# Patient Record
Sex: FEMALE | Race: WHITE | NOT HISPANIC OR LATINO | Employment: FULL TIME | URBAN - METROPOLITAN AREA
[De-identification: names, ages, dates, MRNs, and addresses within clinical notes are randomized per-mention and may not be internally consistent; named-entity substitution may affect disease eponyms.]

---

## 2023-10-11 ENCOUNTER — LAB (OUTPATIENT)
Dept: LAB | Facility: LAB | Age: 38
End: 2023-10-11
Payer: COMMERCIAL

## 2023-10-11 ENCOUNTER — OFFICE VISIT (OUTPATIENT)
Dept: PRIMARY CARE | Facility: CLINIC | Age: 38
End: 2023-10-11
Payer: COMMERCIAL

## 2023-10-11 ENCOUNTER — ANCILLARY PROCEDURE (OUTPATIENT)
Dept: RADIOLOGY | Facility: CLINIC | Age: 38
End: 2023-10-11
Payer: COMMERCIAL

## 2023-10-11 VITALS
BODY MASS INDEX: 36.29 KG/M2 | OXYGEN SATURATION: 97 % | TEMPERATURE: 100.3 F | HEART RATE: 87 BPM | HEIGHT: 69 IN | WEIGHT: 245 LBS

## 2023-10-11 DIAGNOSIS — R06.02 SHORTNESS OF BREATH: ICD-10-CM

## 2023-10-11 DIAGNOSIS — E55.9 VITAMIN D DEFICIENCY: ICD-10-CM

## 2023-10-11 DIAGNOSIS — I10 PRIMARY HYPERTENSION: ICD-10-CM

## 2023-10-11 DIAGNOSIS — I10 PRIMARY HYPERTENSION: Primary | ICD-10-CM

## 2023-10-11 PROBLEM — Z86.39 HISTORY OF THYROID DISORDER: Status: ACTIVE | Noted: 2023-10-11

## 2023-10-11 PROBLEM — Z86.79 HISTORY OF HIGH BLOOD PRESSURE: Status: ACTIVE | Noted: 2023-10-11

## 2023-10-11 LAB
ALBUMIN SERPL BCP-MCNC: 4.3 G/DL (ref 3.4–5)
ALP SERPL-CCNC: 42 U/L (ref 33–110)
ALT SERPL W P-5'-P-CCNC: 12 U/L (ref 7–45)
ANION GAP SERPL CALC-SCNC: 13 MMOL/L (ref 10–20)
AST SERPL W P-5'-P-CCNC: 16 U/L (ref 9–39)
BASOPHILS # BLD AUTO: 0.04 X10*3/UL (ref 0–0.1)
BASOPHILS NFR BLD AUTO: 0.5 %
BILIRUB SERPL-MCNC: 0.7 MG/DL (ref 0–1.2)
BUN SERPL-MCNC: 11 MG/DL (ref 6–23)
CALCIUM SERPL-MCNC: 9.2 MG/DL (ref 8.6–10.3)
CHLORIDE SERPL-SCNC: 102 MMOL/L (ref 98–107)
CO2 SERPL-SCNC: 27 MMOL/L (ref 21–32)
CREAT SERPL-MCNC: 0.73 MG/DL (ref 0.5–1.05)
EOSINOPHIL # BLD AUTO: 0.18 X10*3/UL (ref 0–0.7)
EOSINOPHIL NFR BLD AUTO: 2.3 %
ERYTHROCYTE [DISTWIDTH] IN BLOOD BY AUTOMATED COUNT: 14.6 % (ref 11.5–14.5)
GFR SERPL CREATININE-BSD FRML MDRD: >90 ML/MIN/1.73M*2
GLUCOSE SERPL-MCNC: 112 MG/DL (ref 74–99)
HCT VFR BLD AUTO: 41.8 % (ref 36–46)
HGB BLD-MCNC: 13.1 G/DL (ref 12–16)
IMM GRANULOCYTES # BLD AUTO: 0.03 X10*3/UL (ref 0–0.7)
IMM GRANULOCYTES NFR BLD AUTO: 0.4 % (ref 0–0.9)
LYMPHOCYTES # BLD AUTO: 2 X10*3/UL (ref 1.2–4.8)
LYMPHOCYTES NFR BLD AUTO: 25.5 %
MCH RBC QN AUTO: 29.2 PG (ref 26–34)
MCHC RBC AUTO-ENTMCNC: 31.3 G/DL (ref 32–36)
MCV RBC AUTO: 93 FL (ref 80–100)
MONOCYTES # BLD AUTO: 0.52 X10*3/UL (ref 0.1–1)
MONOCYTES NFR BLD AUTO: 6.6 %
NEUTROPHILS # BLD AUTO: 5.07 X10*3/UL (ref 1.2–7.7)
NEUTROPHILS NFR BLD AUTO: 64.7 %
NRBC BLD-RTO: 0 /100 WBCS (ref 0–0)
PLATELET # BLD AUTO: 438 X10*3/UL (ref 150–450)
PMV BLD AUTO: 10.5 FL (ref 7.5–11.5)
POTASSIUM SERPL-SCNC: 4 MMOL/L (ref 3.5–5.3)
PROT SERPL-MCNC: 7.9 G/DL (ref 6.4–8.2)
RBC # BLD AUTO: 4.49 X10*6/UL (ref 4–5.2)
SODIUM SERPL-SCNC: 138 MMOL/L (ref 136–145)
TSH SERPL-ACNC: 2.12 MIU/L (ref 0.44–3.98)
WBC # BLD AUTO: 7.8 X10*3/UL (ref 4.4–11.3)

## 2023-10-11 PROCEDURE — 80053 COMPREHEN METABOLIC PANEL: CPT

## 2023-10-11 PROCEDURE — 84443 ASSAY THYROID STIM HORMONE: CPT

## 2023-10-11 PROCEDURE — 1036F TOBACCO NON-USER: CPT | Performed by: PHYSICIAN ASSISTANT

## 2023-10-11 PROCEDURE — 71046 X-RAY EXAM CHEST 2 VIEWS: CPT

## 2023-10-11 PROCEDURE — 85025 COMPLETE CBC W/AUTO DIFF WBC: CPT

## 2023-10-11 PROCEDURE — 99214 OFFICE O/P EST MOD 30 MIN: CPT | Performed by: PHYSICIAN ASSISTANT

## 2023-10-11 PROCEDURE — 71046 X-RAY EXAM CHEST 2 VIEWS: CPT | Performed by: RADIOLOGY

## 2023-10-11 PROCEDURE — 36415 COLL VENOUS BLD VENIPUNCTURE: CPT

## 2023-10-11 PROCEDURE — 82652 VIT D 1 25-DIHYDROXY: CPT

## 2023-10-11 RX ORDER — LISINOPRIL 20 MG/1
20 TABLET ORAL DAILY
COMMUNITY
Start: 2023-08-29

## 2023-10-11 RX ORDER — CYCLOBENZAPRINE HCL 10 MG
10 TABLET ORAL 3 TIMES DAILY PRN
COMMUNITY

## 2023-10-11 RX ORDER — CETIRIZINE HYDROCHLORIDE 10 MG/1
TABLET, CHEWABLE ORAL DAILY
COMMUNITY

## 2023-10-11 RX ORDER — METOPROLOL SUCCINATE 25 MG/1
25 TABLET, EXTENDED RELEASE ORAL DAILY
COMMUNITY
Start: 2023-08-29 | End: 2024-01-08 | Stop reason: WASHOUT

## 2023-10-11 RX ORDER — IBUPROFEN 800 MG/1
800 TABLET ORAL EVERY 6 HOURS PRN
COMMUNITY
Start: 2020-07-18

## 2023-10-11 RX ORDER — POLYETHYLENE GLYCOL 3350 17 G/17G
17 POWDER, FOR SOLUTION ORAL DAILY
COMMUNITY
End: 2023-11-15 | Stop reason: WASHOUT

## 2023-10-11 RX ORDER — LEVOTHYROXINE SODIUM 50 UG/1
50 TABLET ORAL DAILY
COMMUNITY

## 2023-10-11 RX ORDER — AZELASTINE 1 MG/ML
1 SPRAY, METERED NASAL 2 TIMES DAILY
COMMUNITY

## 2023-10-11 ASSESSMENT — PATIENT HEALTH QUESTIONNAIRE - PHQ9
2. FEELING DOWN, DEPRESSED OR HOPELESS: NOT AT ALL
SUM OF ALL RESPONSES TO PHQ9 QUESTIONS 1 AND 2: 0
1. LITTLE INTEREST OR PLEASURE IN DOING THINGS: NOT AT ALL

## 2023-10-11 NOTE — PROGRESS NOTES
Subjective   Patient ID: Rajani Morton is a 38 y.o. female who presents for New Patient Visit.    \[facility]\"" care   Pcp Dr. rojas  Patient says has had covid in sept and her bp and pulse has been high and ox very low was concerned.       Pt got Covid in Sept 2023 (the 4th time with Covid, very rough symptoms), dx w/ home test, took 12 days to get better.   Pt is vaccinated (4 total vaccines).  Cold sx were bad, drainage, ST, nausea, cough, HA, on and off LGF, body aches (for 7 d).   NO SOB    Since cold sx got better she was experiencing severe exhausted, HA (9/10 pain), weakness.   She checked BP at home and it was 170/110. She kept checking it for a week and it is been elevated. The bottom number is the one that is not going down under 90. Her NL BP is 130/80.   He O2 sat on RA is in the 90's . She has difficulty getting through sentences when speaking to customers. Her O2 is is dropping to the low 80's when reading to customers for 5 min  She has trouble going up and down the stairs.   She was brought down to 78% with little movement just this AM and pulse is 150-160bpm.     Lisinopril is PM and Metoprolol is AM.    Last labs 1 yr ago.     Review of Systems  Constitutional: Patient denies any fever, chills, loss of appetite, or unexplained weight loss.  Cardiovascular: Patient denies any chest pain, shortness of breath with exertion, tachycardia, palpitations, orthopnea, or paroxysmal nocturnal dyspnea.  Respiratory: Patient denies any cough, shortness breath, or wheezing.  Gastrointestinal patient denies any nausea, vomiting, diarrhea, constipation, melena, hematochezia, or reflux symptoms  Skin: Denies any rashes or skin lesions   Neurology: Patient denies any new motor or sensory losses.  Denies any numbness, tingling, weakness, and incoordination of the extremities.  Patient also denies any tremor, seizures, or gait instability.  Endocrinology: Denies any polyuria, polydipsia, polyphagia, or  "heat/cold intolerance.    Objective   Pulse 87   Temp 37.9 °C (100.3 °F)   Ht 1.753 m (5' 9\")   Wt 111 kg (245 lb)   SpO2 97%   BMI 36.18 kg/m²     Physical Exam  Gen. appearance: Alert and cooperative, in no acute distress, well-developed, well-nourished obese female.  Neck: Supple and without adenopathy or rigidity.  There is no JVD at 90° and no carotid bruits are noted.  There is no thyromegaly, thyroid tenderness, or palpable thyroid nodules.  Heart: Regular rate and rhythm without murmur or ectopy.  Lungs: Lungs are clear to auscultation bilaterally with good air exchange.  Skin: Good turgor, moist, warm and without rashes or lesions.  Neurological exam: Alert and oriented ×3, no tremor, normal gait.  Extremities: No clubbing, cyanosis, or edema    Assessment/Plan   Diagnoses and all orders for this visit:  Primary hypertension  -     TSH with reflex to Free T4 if abnormal; Future  -     24 Hour Blood Pressure Monitor; Future  Patient's blood pressure is uncontrolled and so we ordered a 24-hour blood pressure monitor before adjusting her medications.  She will have that done as soon as possible and we will call with that result as soon as it is reviewed.  She was told that if she notices her blood pressure go over 160/100 (when not wearing the monitor) at home she is to take an extra dose of metoprolol Go to the emergency room for further evaluation.    Shortness of breath  -     XR chest 2 views; Future  -     CBC and Auto Differential; Future  -     Comprehensive Metabolic Panel; Future  - D Dimer  Patient had her fourth COVID infection in September 2023.  She states that it was the worst one by far and that she has had left over symptoms of shortness of breath, fatigue, and elevated pulse.  Patient is to have a chest x-ray done, labs done and we will call with that result as soon as it is reviewed.  We will also consider referral to long COVID clinic if needed.    Vitamin D deficiency  -     Vitamin D " 1,25 Dihydroxy (for eval of hypercalcemia); Future  Patient is currently not on supplementation so we ordered a vitamin D level and we will call her with that result as soon as it is reviewed.    Patient is to have labs and chest x-ray done, 24-hour blood pressure monitor done and we will decide on any further visits after that is reviewed.    Patient understands that should they have testing outside   facilities that we may not receive the results and was told to call us if they have not heard from our office within a week after testing.    [facility] uses voice recognition technology for dictations. Sometimes the software misinterprets words. Please take this into account when reading this.

## 2023-10-12 ENCOUNTER — LAB (OUTPATIENT)
Dept: LAB | Facility: LAB | Age: 38
End: 2023-10-12
Payer: COMMERCIAL

## 2023-10-12 ENCOUNTER — TELEPHONE (OUTPATIENT)
Dept: PRIMARY CARE | Facility: CLINIC | Age: 38
End: 2023-10-12

## 2023-10-12 DIAGNOSIS — R06.02 SHORTNESS OF BREATH: ICD-10-CM

## 2023-10-12 LAB — D DIMER PPP FEU-MCNC: 705 NG/ML FEU

## 2023-10-12 PROCEDURE — 85379 FIBRIN DEGRADATION QUANT: CPT

## 2023-10-12 PROCEDURE — 36415 COLL VENOUS BLD VENIPUNCTURE: CPT

## 2023-10-12 NOTE — TELEPHONE ENCOUNTER
I called the patient asking where she is going to have this monitor put on she said central scheduling department vascular. I have Called the central scheduling department to see what orders they needed and they have no clue how to put it in the system.       Ask Jennifer if she knows ow to put an order for the removal of the 24 bp monitor.

## 2023-10-14 LAB — 1,25(OH)2D3 SERPL-MCNC: 49 PG/ML (ref 19.9–79.3)

## 2023-10-16 ENCOUNTER — APPOINTMENT (OUTPATIENT)
Dept: CARDIOLOGY | Facility: CLINIC | Age: 38
End: 2023-10-16
Payer: COMMERCIAL

## 2023-10-16 DIAGNOSIS — I10 ESSENTIAL HYPERTENSION: Primary | ICD-10-CM

## 2023-10-17 PROBLEM — E66.811 OBESITY, CLASS I, BMI 30-34.9: Status: ACTIVE | Noted: 2022-11-21

## 2023-10-17 PROBLEM — M25.50 POLYARTHRALGIA: Status: ACTIVE | Noted: 2022-05-19

## 2023-10-17 PROBLEM — R53.83 FATIGUE: Status: ACTIVE | Noted: 2022-05-19

## 2023-10-17 PROBLEM — E03.9 HYPOTHYROIDISM, ACQUIRED: Status: ACTIVE | Noted: 2020-04-13

## 2023-10-17 PROBLEM — E66.9 OBESITY, CLASS I, BMI 30-34.9: Status: ACTIVE | Noted: 2022-11-21

## 2023-10-17 PROBLEM — M79.7 FIBROMYALGIA: Status: ACTIVE | Noted: 2017-02-08

## 2023-10-17 ASSESSMENT — ENCOUNTER SYMPTOMS
ORTHOPNEA: 0
SHORTNESS OF BREATH: 1
NECK PAIN: 1
HYPERTENSION: 1
HEADACHES: 1
PND: 0
BLURRED VISION: 0
PALPITATIONS: 0
SWEATS: 1

## 2023-10-18 ENCOUNTER — ANCILLARY PROCEDURE (OUTPATIENT)
Dept: RADIOLOGY | Facility: CLINIC | Age: 38
End: 2023-10-18
Payer: COMMERCIAL

## 2023-10-18 ENCOUNTER — APPOINTMENT (OUTPATIENT)
Dept: PRIMARY CARE | Facility: CLINIC | Age: 38
End: 2023-10-18
Payer: COMMERCIAL

## 2023-10-18 ENCOUNTER — OFFICE VISIT (OUTPATIENT)
Dept: PRIMARY CARE | Facility: CLINIC | Age: 38
End: 2023-10-18
Payer: COMMERCIAL

## 2023-10-18 VITALS
HEART RATE: 78 BPM | TEMPERATURE: 98 F | OXYGEN SATURATION: 98 % | BODY MASS INDEX: 37.06 KG/M2 | HEIGHT: 69 IN | DIASTOLIC BLOOD PRESSURE: 89 MMHG | SYSTOLIC BLOOD PRESSURE: 140 MMHG | WEIGHT: 250.2 LBS

## 2023-10-18 DIAGNOSIS — M79.7 FIBROMYALGIA: ICD-10-CM

## 2023-10-18 DIAGNOSIS — R79.89 D-DIMER, ELEVATED: ICD-10-CM

## 2023-10-18 DIAGNOSIS — R06.02 SHORTNESS OF BREATH: Primary | ICD-10-CM

## 2023-10-18 DIAGNOSIS — I10 ESSENTIAL HYPERTENSION: ICD-10-CM

## 2023-10-18 PROCEDURE — 2550000001 HC RX 255 CONTRASTS

## 2023-10-18 PROCEDURE — 3077F SYST BP >= 140 MM HG: CPT | Performed by: PHYSICIAN ASSISTANT

## 2023-10-18 PROCEDURE — 99214 OFFICE O/P EST MOD 30 MIN: CPT | Performed by: PHYSICIAN ASSISTANT

## 2023-10-18 PROCEDURE — 71275 CT ANGIOGRAPHY CHEST: CPT

## 2023-10-18 PROCEDURE — 3079F DIAST BP 80-89 MM HG: CPT | Performed by: PHYSICIAN ASSISTANT

## 2023-10-18 PROCEDURE — 71275 CT ANGIOGRAPHY CHEST: CPT | Mod: FOREIGN READ | Performed by: RADIOLOGY

## 2023-10-18 PROCEDURE — 1036F TOBACCO NON-USER: CPT | Performed by: PHYSICIAN ASSISTANT

## 2023-10-18 RX ADMIN — IOHEXOL 75 ML: 350 INJECTION, SOLUTION INTRAVENOUS at 11:53

## 2023-10-18 ASSESSMENT — ENCOUNTER SYMPTOMS
NECK PAIN: 1
BLURRED VISION: 0
SHORTNESS OF BREATH: 1
PND: 0
ORTHOPNEA: 0
HYPERTENSION: 1
SWEATS: 1
HEADACHES: 1
PALPITATIONS: 0

## 2023-10-18 ASSESSMENT — PATIENT HEALTH QUESTIONNAIRE - PHQ9
SUM OF ALL RESPONSES TO PHQ9 QUESTIONS 1 AND 2: 0
1. LITTLE INTEREST OR PLEASURE IN DOING THINGS: NOT AT ALL
2. FEELING DOWN, DEPRESSED OR HOPELESS: NOT AT ALL

## 2023-10-18 NOTE — PROGRESS NOTES
"Subjective   Patient ID: Rajani Morton is a 38 y.o. female who presents for Hypertension and Shortness of Breath.    Hypertension  This is a recurrent problem. The current episode started more than 1 year ago. The problem has been rapidly worsening since onset. The problem is resistant. Associated symptoms include headaches, neck pain, peripheral edema, shortness of breath and sweats. Pertinent negatives include no anxiety, blurred vision, chest pain, malaise/fatigue, orthopnea, palpitations or PND. Agents associated with hypertension include NSAIDs and thyroid hormones. Risk factors for coronary artery disease include family history, obesity, sedentary lifestyle and stress. Compliance problems include diet and exercise.      161/108 BP this morning when she checked it.  Chest feels tight and heavy  O2 is not dropping as low but she feels like she does have some SOB.   D Dimer was elevated at 705.  + LE edema slightly worse than usual.    A lot of autoimmune issues:  Auto immune of liver in sister    + family h/o afib      Review of Systems   Constitutional:  Negative for malaise/fatigue.   Eyes:  Negative for blurred vision.   Respiratory:  Positive for shortness of breath.    Cardiovascular:  Negative for chest pain, palpitations, orthopnea and PND.   Musculoskeletal:  Positive for neck pain.   Neurological:  Positive for headaches.       Objective   /89   Pulse 78   Temp 36.7 °C (98 °F)   Ht 1.753 m (5' 9\")   Wt 113 kg (250 lb 3.2 oz)   SpO2 98%   BMI 36.95 kg/m²     Physical Exam  Gen. appearance: Alert and cooperative, in no acute distress, well-developed, well-nourished obese female.  Neck: Supple and without adenopathy or rigidity.  There is no JVD at 90° and no carotid bruits are noted.  There is no thyromegaly, thyroid tenderness, or palpable thyroid nodules.  Heart: Regular rate and rhythm without murmur or ectopy.  Lungs: Lungs are clear to auscultation bilaterally with good air " exchange.  Skin: Good turgor, moist, warm and without rashes or lesions.  Neurological exam: Alert and oriented ×3, no tremor, normal gait.  Extremities: No clubbing, cyanosis, or edema    Assessment/Plan   Diagnoses and all orders for this visit:  Shortness of breath  -     CT angio chest for pulmonary embolism; Future  Patient's D-dimer was elevated and she is symptomatic with a drop in her O2 after having COVID a month ago.  She is to have a stat CT angio of the chest and we will call her with the results as soon as it is reviewed.    D-dimer, elevated  -     CT angio chest for pulmonary embolism; Future    HTN-patient is to stop at the  to have her 24-hour blood pressure monitor scheduled.  Order is in a previous conversation.  Blood pressure still slightly elevated today.  She is compliant with her lisinopril.    Fibromyalgia  -     Referral to Rheumatology; Future  She has known fibromyalgia and she states that her fibromyalgia and fatigue have escalated.  Referral to rheumatology will be scheduled before she leaves the office.    Patient's return to the clinic will be decided once her CT has been reviewed.    Patient understands that should they have testing outside   facilities that we may not receive the results and was told to call us if they have not heard from our office within a week after testing.    [facility] uses voice recognition technology for dictations. Sometimes the software misinterprets words. Please take this into account when reading this.

## 2023-10-18 NOTE — TELEPHONE ENCOUNTER
----- Message from Faustina Soria PA-C sent at 10/18/2023 12:59 PM EDT -----  Please call the patient regarding her CT, it was NL, no evidence of pulm embolism.

## 2023-10-23 ENCOUNTER — HOSPITAL ENCOUNTER (OUTPATIENT)
Dept: CARDIOLOGY | Facility: CLINIC | Age: 38
Discharge: HOME | End: 2023-10-23
Payer: COMMERCIAL

## 2023-10-23 DIAGNOSIS — I10 ESSENTIAL HYPERTENSION: ICD-10-CM

## 2023-10-23 DIAGNOSIS — I10 PRIMARY HYPERTENSION: ICD-10-CM

## 2023-10-23 DIAGNOSIS — K76.0 FATTY METAMORPHOSIS OF LIVER: Primary | ICD-10-CM

## 2023-10-24 ENCOUNTER — TELEMEDICINE (OUTPATIENT)
Dept: GASTROENTEROLOGY | Facility: CLINIC | Age: 38
End: 2023-10-24
Payer: COMMERCIAL

## 2023-10-24 ENCOUNTER — HOSPITAL ENCOUNTER (OUTPATIENT)
Dept: CARDIOLOGY | Facility: CLINIC | Age: 38
Discharge: HOME | End: 2023-10-24
Payer: COMMERCIAL

## 2023-10-24 ENCOUNTER — LAB (OUTPATIENT)
Dept: LAB | Facility: LAB | Age: 38
End: 2023-10-24
Payer: COMMERCIAL

## 2023-10-24 DIAGNOSIS — K76.0 FATTY METAMORPHOSIS OF LIVER: ICD-10-CM

## 2023-10-24 DIAGNOSIS — K58.1 IRRITABLE BOWEL SYNDROME WITH CONSTIPATION: ICD-10-CM

## 2023-10-24 DIAGNOSIS — K76.0 FATTY LIVER: Primary | ICD-10-CM

## 2023-10-24 DIAGNOSIS — R00.0 TACHYCARDIA: ICD-10-CM

## 2023-10-24 DIAGNOSIS — I10 ELEVATED HEART RATE WITH ELEVATED BLOOD PRESSURE AND DIAGNOSIS OF HYPERTENSION: ICD-10-CM

## 2023-10-24 DIAGNOSIS — I10 ESSENTIAL HYPERTENSION: ICD-10-CM

## 2023-10-24 DIAGNOSIS — R00.9 ELEVATED HEART RATE WITH ELEVATED BLOOD PRESSURE AND DIAGNOSIS OF HYPERTENSION: ICD-10-CM

## 2023-10-24 DIAGNOSIS — R10.11 RUQ PAIN: ICD-10-CM

## 2023-10-24 DIAGNOSIS — I10 ACCELERATED ESSENTIAL HYPERTENSION: ICD-10-CM

## 2023-10-24 LAB
A1AT SERPL NEPH-MCNC: 160 MG/DL (ref 84–218)
HBV CORE IGM SER QL: NONREACTIVE
HBV SURFACE AG SERPL QL IA: NONREACTIVE
HCV AB SER QL: NONREACTIVE
IRON SATN MFR SERPL: 27 % (ref 25–45)
IRON SERPL-MCNC: 88 UG/DL (ref 35–150)
TIBC SERPL-MCNC: 328 UG/DL (ref 240–445)
UIBC SERPL-MCNC: 240 UG/DL (ref 110–370)

## 2023-10-24 PROCEDURE — 93788 AMBL BP MNTR W/SW A/R: CPT

## 2023-10-24 PROCEDURE — 99214 OFFICE O/P EST MOD 30 MIN: CPT | Mod: 95,25 | Performed by: NURSE PRACTITIONER

## 2023-10-24 PROCEDURE — 86707 HEPATITIS BE ANTIBODY: CPT

## 2023-10-24 PROCEDURE — 99204 OFFICE O/P NEW MOD 45 MIN: CPT | Performed by: NURSE PRACTITIONER

## 2023-10-24 PROCEDURE — 87340 HEPATITIS B SURFACE AG IA: CPT

## 2023-10-24 PROCEDURE — 36415 COLL VENOUS BLD VENIPUNCTURE: CPT

## 2023-10-24 PROCEDURE — 83550 IRON BINDING TEST: CPT

## 2023-10-24 PROCEDURE — 82103 ALPHA-1-ANTITRYPSIN TOTAL: CPT

## 2023-10-24 PROCEDURE — 86015 ACTIN ANTIBODY EACH: CPT

## 2023-10-24 PROCEDURE — 86803 HEPATITIS C AB TEST: CPT

## 2023-10-24 PROCEDURE — 86381 MITOCHONDRIAL ANTIBODY EACH: CPT

## 2023-10-24 PROCEDURE — 93784 AMBL BP MNTR W/SOFTWARE: CPT | Performed by: INTERNAL MEDICINE

## 2023-10-24 PROCEDURE — 86705 HEP B CORE ANTIBODY IGM: CPT

## 2023-10-24 PROCEDURE — 83540 ASSAY OF IRON: CPT

## 2023-10-24 PROCEDURE — 93790 AMBL BP MNTR W/SW I&R: CPT | Performed by: PHYSICIAN ASSISTANT

## 2023-10-24 PROCEDURE — 86376 MICROSOMAL ANTIBODY EACH: CPT

## 2023-10-24 RX ORDER — WHEAT DEXTRIN 5 G/7.4 G
POWDER (GRAM) ORAL
Qty: 248 G | Refills: 1 | Status: SHIPPED | OUTPATIENT
Start: 2023-10-24 | End: 2024-04-19 | Stop reason: WASHOUT

## 2023-10-24 RX ORDER — POLYETHYLENE GLYCOL 3350 17 G/17G
17 POWDER, FOR SOLUTION ORAL DAILY PRN
Qty: 30 PACKET | Refills: 2 | Status: SHIPPED | OUTPATIENT
Start: 2023-10-24 | End: 2024-01-24

## 2023-10-24 RX ORDER — OMEPRAZOLE 20 MG/1
20 TABLET, DELAYED RELEASE ORAL DAILY
Qty: 30 TABLET | Refills: 11 | Status: SHIPPED | OUTPATIENT
Start: 2023-10-24 | End: 2024-02-12 | Stop reason: WASHOUT

## 2023-10-24 ASSESSMENT — ENCOUNTER SYMPTOMS
CHEST TIGHTNESS: 0
ROS GI COMMENTS: SEE HPI
WHEEZING: 0
EYES NEGATIVE: 1
NEUROLOGICAL NEGATIVE: 1
MUSCULOSKELETAL NEGATIVE: 1
COUGH: 0
PSYCHIATRIC NEGATIVE: 1
APNEA: 0
DIFFICULTY URINATING: 0
SHORTNESS OF BREATH: 0
HEMATOLOGIC/LYMPHATIC NEGATIVE: 1
CARDIOVASCULAR NEGATIVE: 1
DIAPHORESIS: 0
STRIDOR: 0
RESPIRATORY NEGATIVE: 1
ENDOCRINE NEGATIVE: 1
CHILLS: 0
FATIGUE: 0
ALLERGIC/IMMUNOLOGIC NEGATIVE: 1
FEVER: 0

## 2023-10-24 NOTE — PROGRESS NOTES
Subjective   Patient ID: Rajani Morton is a 38 y.o. female who presents for No chief complaint on file..  Presents today for IBS-C  Fatty liver on cardiac CT   Has RUQ pain. Was seen in the ED, having severe pain in the RUQ. Had CT and US that was negative. This lasted for one day.   Thought maybe food made it worse, pain was happening in the middle of the night. The pain has improved but still there. Localized to the RUQ. The pain radiates to her back. Liver Enzymes were normal. She has extra gassy.     Everything is worse since COVID last month.     She denies heartburn, reflux, vomiting, dysphagia, odynphagia.       For IBS-C, she reprots she is not taking anything. She reports nothing was covered. She did have a colonsocopy.  She has a BM a few times/week. She is using Miralax. She is using stool softener. She drinks a lot of water.   The colonoscopy was normal. She did not try Linzess    Her PCP ordered labs for fatty liver.     Family Hx: She denies a family hx of CRC, GI cancers      PSH: Hysterectomy (endometriosis)        Review of Systems   Constitutional:  Negative for chills, diaphoresis, fatigue and fever.   HENT: Negative.     Eyes: Negative.    Respiratory: Negative.  Negative for apnea, cough, chest tightness, shortness of breath, wheezing and stridor.    Cardiovascular: Negative.    Gastrointestinal:         See HPI    Endocrine: Negative.    Genitourinary: Negative.  Negative for difficulty urinating.   Musculoskeletal: Negative.    Skin: Negative.    Allergic/Immunologic: Negative.    Neurological: Negative.    Hematological: Negative.    Psychiatric/Behavioral: Negative.         Objective   Physical Exam  Neurological:      Mental Status: She is alert.   Psychiatric:         Mood and Affect: Mood normal.         Assessment/Plan   Diagnoses and all orders for this visit:  Fatty liver  -     Liver Elastography (Fibroscan); Future  Irritable bowel syndrome with constipation  -     wheat dextrin  (Benefiber Healthy Shape) 5 gram/7.4 gram powder; Take 1 teaspoon daily  -     polyethylene glycol (Miralax) packet; Take 17 g by mouth once daily as needed (as needed for constipation).  RUQ pain  -     NM hepatobiliary w cholecystokinin; Future  -     omeprazole OTC (Prilosec OTC) 20 mg EC tablet; Take 1 tablet (20 mg) by mouth once daily. Do not crush, chew, or split.  -     US gallbladder; Future  Accelerated essential hypertension  Elevated heart rate with elevated blood pressure and diagnosis of hypertension  -     Referral to Cardiology; Future  Tachycardia  -     Referral to Cardiology; Future    This is a 38 year old female with multiple GI complaints. Started having RUQ pain, radiating to back, woke her from sleep. She was seen in the ED and had a normal CT and US, however found to have fatty liver. She has chronic constipation, a prior colonoscopy (2023) was normal.     Fatty liver   - fibroscan, await lab results   2. IBS-C  - start fiber and Miralax and she will message medication that caused allergic reaction and discuss Linzess/IBSrela at follow up  3. RUQ pain  - check HIDA scan, GBUS, discussed s/sx to go to the ER   4. F/up in 1 month

## 2023-10-24 NOTE — PROCEDURES
24-hour Ambulatory Blood Pressure Monitor Report  [facility]    Device: EnerTrac Ro ABPM 7100    Period of Monitoring: From 10/23/2023, 9:52 AM to 10/24/2023, 9:20 AM.     Successful Readings: 56 (93 %)     Indication:   Suspected white coat hypertension    Current Medications:  Current Outpatient Medications on File Prior to Encounter   Medication Sig Dispense Refill    azelastine (Astelin) 137 mcg (0.1 %) nasal spray Administer 1 spray into each nostril 2 times a day. Use in each nostril as directed      cetirizine (ZyrTEC) 10 mg chewable tablet Chew once daily.      cyclobenzaprine (Flexeril) 10 mg tablet Take 1 tablet (10 mg) by mouth 3 times a day as needed for muscle spasms.      ibuprofen 800 mg tablet Take 1 tablet (800 mg) by mouth every 6 hours if needed for headaches, mild pain (1 - 3) or moderate pain (4 - 6).      levothyroxine (Synthroid, Levoxyl) 50 mcg tablet Take 1 tablet (50 mcg) by mouth once daily.      lisinopril 20 mg tablet Take 1 tablet (20 mg) by mouth once daily.      metoprolol succinate XL (Toprol-XL) 25 mg 24 hr tablet Take 1 tablet (25 mg) by mouth once daily.      polyethylene glycol (Glycolax, Miralax) 17 gram/dose powder Take 17 g by mouth once daily.       No current facility-administered medications on file prior to encounter.          Findings (Please see attached report):   Average ambulatory blood pressure was 139/87 mmHg with a heart rate of 76 beats per minute.     The highest SBP and DBP was 158 at 9:00 and 141 at 11:40.     The lowest SBP and DBP was 112 at 21:00 and 61 at 15:20.     From 8 a.m. to 11 p.m., average blood pressure was 144/89 mmHg with average heart rate of 76 beats per minute.     From 11 p.m. to 8 a.m., average blood pressure was 121/77 mmHg with a heart rate of 67 beats per minute.     Patient went to bed at 9:32 PM and woke up at 6:03 AM.    Patient reported symptoms: None     Impression: Average 24-hour  ambulatory blood pressure of 139/87 mmHg indicates stage 1 hypertension. Patient has normal nocturnal BP dipping. No white coat effect is seen.    Recommendation: Management per referring clinician.       Franco Joyce MD, FA, [facility]  Director,  Center for Cardiovascular Prevention  [facility]  [facility]       Recommended standards for normal ambulatory blood pressure values which are proposed to be equivalent to clinic BP of <130/80 mmHg include: daytime BP <130/80 mg Hg, nighttime BP <110/65 mm Hg, and 24 hour BP <125/75 mm Hg.   (Ari VALENTINE, et al.2017 High Blood Pressure Clinical Practice Guideline, Hypertension. 2017).     The clinical criteria for white coat hypertension are defined as:   Office blood pressure =130/80 mm Hg but <160/100 mm Hg after three month trial of lifestyle modification and suspected white coat hypertension with daytime ABPM or HBPM blood pressure <130/80 mm Hg.     The clinical criteria for masked hypertension are defined as:  Office blood pressure of 120 - 129/<80 mm Hg after three month trial of lifestyle modification and suspected masked hypertension with daytime ABPM or HBPM blood pressure =130/80 mm Hg.   (Measurement of Blood Pressure in Humans, A Scientific Statement from the American Heart Association, May 2019).

## 2023-10-25 ENCOUNTER — ANCILLARY PROCEDURE (OUTPATIENT)
Dept: RADIOLOGY | Facility: CLINIC | Age: 38
End: 2023-10-25
Payer: COMMERCIAL

## 2023-10-25 DIAGNOSIS — R10.11 RUQ PAIN: ICD-10-CM

## 2023-10-25 DIAGNOSIS — I10 ESSENTIAL HYPERTENSION: Primary | ICD-10-CM

## 2023-10-25 LAB — SMOOTH MUSCLE AB SER QL IF: NEGATIVE

## 2023-10-25 PROCEDURE — 76705 ECHO EXAM OF ABDOMEN: CPT

## 2023-10-25 PROCEDURE — 76705 ECHO EXAM OF ABDOMEN: CPT | Performed by: RADIOLOGY

## 2023-10-25 RX ORDER — PROPRANOLOL HYDROCHLORIDE 10 MG/1
10 TABLET ORAL 2 TIMES DAILY
Qty: 60 TABLET | Refills: 0 | Status: SHIPPED | OUTPATIENT
Start: 2023-10-25 | End: 2023-11-15 | Stop reason: ALTCHOICE

## 2023-10-26 LAB
HBV E AB SERPL QL IA: NEGATIVE
MITOCHONDRIA AB SER QL IF: NEGATIVE

## 2023-10-27 LAB — LKM AB TITR SER IF: NORMAL {TITER}

## 2023-10-31 ENCOUNTER — TELEPHONE (OUTPATIENT)
Dept: GASTROENTEROLOGY | Facility: CLINIC | Age: 38
End: 2023-10-31
Payer: COMMERCIAL

## 2023-11-01 ENCOUNTER — CLINICAL SUPPORT (OUTPATIENT)
Dept: GASTROENTEROLOGY | Facility: CLINIC | Age: 38
End: 2023-11-01
Payer: COMMERCIAL

## 2023-11-01 DIAGNOSIS — K76.0 FATTY LIVER: ICD-10-CM

## 2023-11-01 PROCEDURE — 91200 LIVER ELASTOGRAPHY: CPT | Performed by: INTERNAL MEDICINE

## 2023-11-01 PROCEDURE — 91200 LIVER ELASTOGRAPHY: CPT

## 2023-11-01 NOTE — PROGRESS NOTES
HEPATOLOGY ATTENDING NOTE    I personally reviewed and interpreted the FibroSCAN results.    It revealed a median liver stiffness of 4.8 kPa with an IQR of 29% and .    This is consistent with stage 0-1 disease and severe steatosis.    I will forward this result to the referring provider (Stephie).      FABIANO Montana.

## 2023-11-02 ENCOUNTER — TELEPHONE (OUTPATIENT)
Dept: GASTROENTEROLOGY | Facility: CLINIC | Age: 38
End: 2023-11-02
Payer: COMMERCIAL

## 2023-11-03 ENCOUNTER — HOSPITAL ENCOUNTER (OUTPATIENT)
Dept: RADIOLOGY | Facility: HOSPITAL | Age: 38
Discharge: HOME | End: 2023-11-03
Payer: COMMERCIAL

## 2023-11-03 DIAGNOSIS — R10.11 RUQ PAIN: ICD-10-CM

## 2023-11-03 PROCEDURE — 78226 HEPATOBILIARY SYSTEM IMAGING: CPT | Performed by: STUDENT IN AN ORGANIZED HEALTH CARE EDUCATION/TRAINING PROGRAM

## 2023-11-03 PROCEDURE — 3430000001 HC RX 343 DIAGNOSTIC RADIOPHARMACEUTICALS: Performed by: NURSE PRACTITIONER

## 2023-11-03 PROCEDURE — 78227 HEPATOBIL SYST IMAGE W/DRUG: CPT

## 2023-11-03 RX ADMIN — JONES TAKE HOME HYDROCODONE-APAP 5/325MG (8 TABS) 5.1 MILLICURIE: TAB at 08:00

## 2023-11-10 ENCOUNTER — APPOINTMENT (OUTPATIENT)
Dept: RADIOLOGY | Facility: CLINIC | Age: 38
End: 2023-11-10
Payer: COMMERCIAL

## 2023-11-15 ENCOUNTER — OFFICE VISIT (OUTPATIENT)
Dept: CARDIOLOGY | Facility: CLINIC | Age: 38
End: 2023-11-15
Payer: COMMERCIAL

## 2023-11-15 VITALS
HEIGHT: 69 IN | SYSTOLIC BLOOD PRESSURE: 136 MMHG | BODY MASS INDEX: 36.29 KG/M2 | DIASTOLIC BLOOD PRESSURE: 97 MMHG | HEART RATE: 84 BPM | OXYGEN SATURATION: 96 % | WEIGHT: 245 LBS

## 2023-11-15 DIAGNOSIS — R00.0 TACHYCARDIA: ICD-10-CM

## 2023-11-15 DIAGNOSIS — R00.9 ELEVATED HEART RATE WITH ELEVATED BLOOD PRESSURE AND DIAGNOSIS OF HYPERTENSION: ICD-10-CM

## 2023-11-15 DIAGNOSIS — I10 ELEVATED HEART RATE WITH ELEVATED BLOOD PRESSURE AND DIAGNOSIS OF HYPERTENSION: ICD-10-CM

## 2023-11-15 PROCEDURE — 3080F DIAST BP >= 90 MM HG: CPT | Performed by: INTERNAL MEDICINE

## 2023-11-15 PROCEDURE — 99214 OFFICE O/P EST MOD 30 MIN: CPT | Performed by: INTERNAL MEDICINE

## 2023-11-15 PROCEDURE — 93010 ELECTROCARDIOGRAM REPORT: CPT | Performed by: INTERNAL MEDICINE

## 2023-11-15 PROCEDURE — 93005 ELECTROCARDIOGRAM TRACING: CPT | Performed by: INTERNAL MEDICINE

## 2023-11-15 PROCEDURE — 3075F SYST BP GE 130 - 139MM HG: CPT | Performed by: INTERNAL MEDICINE

## 2023-11-15 PROCEDURE — 1036F TOBACCO NON-USER: CPT | Performed by: INTERNAL MEDICINE

## 2023-11-15 PROCEDURE — 99204 OFFICE O/P NEW MOD 45 MIN: CPT | Performed by: INTERNAL MEDICINE

## 2023-11-15 NOTE — PROGRESS NOTES
Name : Rajani Morton    : 1985   MRN : [id number]   ENC Date : 11/15/23     Reason for visit: Dyspnea and tachycardia    Assessment and Plan:  Dyspnea: Unclear etiology but certainly she has risk factors for post-COVID pulmonary disorder.  She could have had some in situ thrombus with COVID as well given she had a positive D-dimer.  I think a good set of pulmonary function test is the first place to start.  If the DLCO is abnormal then we would look to a nuclear medicine lung perfusion scan.  We will also get an echocardiogram to assess for cardiomyopathy as well as diastolic dysfunction.  Tachycardia: This does not seem to be a true tachycardia.  Her symptoms of elevated heart rate all seem to be when being active.  There does not appear to be any abrupt onset of SVT.  For now I did not order a monitor.  My thought processes if the echocardiogram and PFTs are normal we may set her up for a treadmill ECG to see if there is any exercise-induced arrhythmias which can occur but are less frequently seen than other types of SVT.  Another possibility may be a metabolic cardiopulmonary stress test to try to get a better sense as to what is her underlying cause of these combination of symptoms.    Questionable POTS: Patient's presentation does not really fit with this diagnosis.  Her tachycardia is with activity not necessarily with change in position.  Even with that sometimes her heart rate goes up with bending over which is the opposite of the change in position that typically causes the excessive tachycardia.  She is already on low-dose metoprolol.  She really does not feel much different on propranolol added to that.  I told her to discontinue that and see how she does.  She was complaining of some night sweats with the addition of propranolol which may or may not be related.  We talked about sodium loading as well as hydration in addition to Florinef and midodrine.  I do not think her symptoms are significant  enough for any of these and in fact with her underlying hypertension we would want to avoid salt loading if possible.  At this point I would simply focus on maintaining adequate hydration while we get the above tests.  Disp: Phone follow-up with above tests.      HPI:  Patient is seen today for symptoms of tachycardia dyspnea and fatigue.  Patient was diagnosed with COVID in September of this year.  Since then she has been feeling poorly but actually her symptoms predate COVID as well.  She has constellation of symptoms that includes shortness of breath with fairly minimal activity.  She states that her heart rate at rest is sometimes bradycardic but with even minimal activity her heart rate will shoot up fairly quickly and she says that she is documented as high as 170 bpm but typically it is in the 140s when she is being active.  She states that she can feel the tachycardia in her throat.  She never really has had any abrupt onset of tachycardia in the absence of activity however.  She has had multiple syncopal events in the past but actually nothing recently.  The syncopal events had no particular pattern.  There was no obvious vasovagal  Again however these have not occurred in many years.  She denies any chest discomfort.  She does states that her oxygen levels can drop periodically into the high 70s but with deep breathing and rest oxygen levels will return back to the 90s.      Problem List:   Patient Active Problem List   Diagnosis    History of high blood pressure    History of thyroid disorder    Chronic pelvic pain in female    Endometriosis    Fibromyalgia    Essential hypertension    Fatigue    Hypothyroidism, acquired    Migraine headache    Obesity, Class I, BMI 30-34.9    Polyarthralgia        Meds:   Current Outpatient Medications on File Prior to Visit   Medication Sig Dispense Refill    azelastine (Astelin) 137 mcg (0.1 %) nasal spray Administer 1 spray into each nostril 2 times a day. Use in each  nostril as directed      cetirizine (ZyrTEC) 10 mg chewable tablet Chew once daily.      cyclobenzaprine (Flexeril) 10 mg tablet Take 1 tablet (10 mg) by mouth 3 times a day as needed for muscle spasms.      ibuprofen 800 mg tablet Take 1 tablet (800 mg) by mouth every 6 hours if needed for headaches, mild pain (1 - 3) or moderate pain (4 - 6).      levothyroxine (Synthroid, Levoxyl) 50 mcg tablet Take 1 tablet (50 mcg) by mouth once daily.      lisinopril 20 mg tablet Take 1 tablet (20 mg) by mouth once daily.      metoprolol succinate XL (Toprol-XL) 25 mg 24 hr tablet Take 1 tablet (25 mg) by mouth once daily.      omeprazole OTC (PriLOSEC OTC) 20 mg EC tablet Take 1 tablet (20 mg) by mouth once daily. Do not crush, chew, or split. 30 tablet 11    polyethylene glycol (Miralax) packet Take 17 g by mouth once daily as needed (as needed for constipation). 30 packet 2    propranolol (Inderal) 10 mg tablet Take 1 tablet (10 mg) by mouth 2 times a day. 60 tablet 0    wheat dextrin (Benefiber Healthy Shape) 5 gram/7.4 gram powder Take 1 teaspoon daily 248 g 1    [DISCONTINUED] polyethylene glycol (Glycolax, Miralax) 17 gram/dose powder Take 17 g by mouth once daily.       No current facility-administered medications on file prior to visit.       All:   Allergies   Allergen Reactions    Morphine Nausea/vomiting       Fam Hx:   Family History   Problem Relation Name Age of Onset    No Known Problems Mother         Soc Hx:   Social History     Socioeconomic History    Marital status:      Spouse name: Not on file    Number of children: Not on file    Years of education: Not on file    Highest education level: Not on file   Occupational History    Occupation: sell insurance from [employer]   Tobacco Use    Smoking status: Never    Smokeless tobacco: Never   Vaping Use    Vaping Use: Never used   Substance and Sexual Activity    Alcohol use: Yes     Comment: socially    Drug use: Never    Sexual activity: Yes      "Partners: Male     Comment:    Other Topics Concern    Not on file   Social History Narrative    Not on file     Social Determinants of Health     Financial Resource Strain: Not on file   Food Insecurity: Not on file   Transportation Needs: Not on file   Physical Activity: Not on file   Stress: Not on file   Social Connections: Not on file   Intimate Partner Violence: Not on file   Housing Stability: Not on file       ROS    VS: BP (!) 136/97 (BP Location: Left arm, Patient Position: Sitting)   Pulse 84   Ht 1.753 m (5' 9\")   Wt 111 kg (245 lb)   SpO2 96%   BMI 36.18 kg/m²      Physical Exam  Vitals reviewed.   Constitutional:       Appearance: Normal appearance. She is overweight.   Eyes:      Pupils: Pupils are equal, round, and reactive to light.   Neck:      Vascular: No JVD.   Cardiovascular:      Rate and Rhythm: Normal rate and regular rhythm.      Pulses: Normal pulses.      Heart sounds: No murmur heard.     No gallop.   Pulmonary:      Effort: No respiratory distress.      Breath sounds: No wheezing or rales.   Abdominal:      General: Abdomen is flat. There is no distension.      Palpations: Abdomen is soft.   Musculoskeletal:         General: No swelling.      Right lower leg: No edema.      Left lower leg: No edema.   Neurological:      General: No focal deficit present.      Mental Status: She is alert.   Psychiatric:         Mood and Affect: Mood normal.        ECG: Normal sinus rhythm.  Normal ECG      Migel Aleman MD   "

## 2023-11-17 ENCOUNTER — HOSPITAL ENCOUNTER (OUTPATIENT)
Dept: RESPIRATORY THERAPY | Facility: HOSPITAL | Age: 38
Discharge: HOME | End: 2023-11-17
Payer: COMMERCIAL

## 2023-11-17 DIAGNOSIS — I10 ELEVATED HEART RATE WITH ELEVATED BLOOD PRESSURE AND DIAGNOSIS OF HYPERTENSION: ICD-10-CM

## 2023-11-17 DIAGNOSIS — R00.9 ELEVATED HEART RATE WITH ELEVATED BLOOD PRESSURE AND DIAGNOSIS OF HYPERTENSION: ICD-10-CM

## 2023-11-17 DIAGNOSIS — R00.0 TACHYCARDIA: ICD-10-CM

## 2023-11-17 PROCEDURE — 94060 EVALUATION OF WHEEZING: CPT

## 2023-11-20 LAB
ATRIAL RATE: 73 BPM
P AXIS: 13 DEGREES
P OFFSET: 196 MS
P ONSET: 153 MS
PR INTERVAL: 138 MS
Q ONSET: 222 MS
QRS COUNT: 12 BEATS
QRS DURATION: 86 MS
QT INTERVAL: 404 MS
QTC CALCULATION(BAZETT): 445 MS
QTC FREDERICIA: 431 MS
R AXIS: 5 DEGREES
T AXIS: -3 DEGREES
T OFFSET: 424 MS
VENTRICULAR RATE: 73 BPM

## 2023-11-27 ENCOUNTER — HOSPITAL ENCOUNTER (OUTPATIENT)
Dept: RESPIRATORY THERAPY | Facility: HOSPITAL | Age: 38
End: 2023-11-27
Payer: COMMERCIAL

## 2023-11-27 ENCOUNTER — ANCILLARY PROCEDURE (OUTPATIENT)
Dept: CARDIOLOGY | Facility: CLINIC | Age: 38
End: 2023-11-27
Payer: COMMERCIAL

## 2023-11-27 DIAGNOSIS — I10 ELEVATED HEART RATE WITH ELEVATED BLOOD PRESSURE AND DIAGNOSIS OF HYPERTENSION: ICD-10-CM

## 2023-11-27 DIAGNOSIS — R00.9 ELEVATED HEART RATE WITH ELEVATED BLOOD PRESSURE AND DIAGNOSIS OF HYPERTENSION: ICD-10-CM

## 2023-11-27 DIAGNOSIS — R00.0 TACHYCARDIA: ICD-10-CM

## 2023-11-27 LAB
AORTIC VALVE MEAN GRADIENT: 6
AORTIC VALVE PEAK VELOCITY: 1.49
AV PEAK GRADIENT: 8.9
AVA (PEAK VEL): 2.24
AVA (VTI): 1.91
EJECTION FRACTION APICAL 4 CHAMBER: 47.7
EJECTION FRACTION: 47
LEFT VENTRICLE INTERNAL DIMENSION DIASTOLE: 5.69 (ref 3.5–6)
LEFT VENTRICULAR OUTFLOW TRACT DIAMETER: 2.1
MITRAL VALVE E/A RATIO: 1.09
MITRAL VALVE E/E' RATIO: 5.6
RIGHT VENTRICLE PEAK SYSTOLIC PRESSURE: 33.5

## 2023-11-27 PROCEDURE — 93306 TTE W/DOPPLER COMPLETE: CPT

## 2023-11-27 PROCEDURE — 93306 TTE W/DOPPLER COMPLETE: CPT | Performed by: INTERNAL MEDICINE

## 2023-11-29 ENCOUNTER — APPOINTMENT (OUTPATIENT)
Dept: RHEUMATOLOGY | Facility: CLINIC | Age: 38
End: 2023-11-29
Payer: COMMERCIAL

## 2023-12-01 ENCOUNTER — HOSPITAL ENCOUNTER (EMERGENCY)
Facility: HOSPITAL | Age: 38
Discharge: HOME | End: 2023-12-01
Attending: EMERGENCY MEDICINE
Payer: COMMERCIAL

## 2023-12-01 ENCOUNTER — APPOINTMENT (OUTPATIENT)
Dept: RADIOLOGY | Facility: HOSPITAL | Age: 38
End: 2023-12-01
Payer: COMMERCIAL

## 2023-12-01 VITALS
OXYGEN SATURATION: 99 % | DIASTOLIC BLOOD PRESSURE: 91 MMHG | TEMPERATURE: 97.5 F | BODY MASS INDEX: 35.44 KG/M2 | HEART RATE: 87 BPM | SYSTOLIC BLOOD PRESSURE: 146 MMHG | WEIGHT: 240 LBS | RESPIRATION RATE: 16 BRPM

## 2023-12-01 DIAGNOSIS — R55 SYNCOPE AND COLLAPSE: Primary | ICD-10-CM

## 2023-12-01 DIAGNOSIS — S93.602A SPRAIN OF LEFT FOOT, INITIAL ENCOUNTER: ICD-10-CM

## 2023-12-01 LAB
ALBUMIN SERPL BCP-MCNC: 4.5 G/DL (ref 3.4–5)
ALP SERPL-CCNC: 39 U/L (ref 33–110)
ALT SERPL W P-5'-P-CCNC: 16 U/L (ref 7–45)
ANION GAP SERPL CALC-SCNC: 13 MMOL/L (ref 10–20)
AST SERPL W P-5'-P-CCNC: 40 U/L (ref 9–39)
BASOPHILS # BLD AUTO: 0.05 X10*3/UL (ref 0–0.1)
BASOPHILS NFR BLD AUTO: 0.6 %
BILIRUB SERPL-MCNC: 0.4 MG/DL (ref 0–1.2)
BUN SERPL-MCNC: 12 MG/DL (ref 6–23)
CALCIUM SERPL-MCNC: 9.6 MG/DL (ref 8.6–10.3)
CARDIAC TROPONIN I PNL SERPL HS: <3 NG/L (ref 0–13)
CHLORIDE SERPL-SCNC: 101 MMOL/L (ref 98–107)
CO2 SERPL-SCNC: 29 MMOL/L (ref 21–32)
CREAT SERPL-MCNC: 0.78 MG/DL (ref 0.5–1.05)
EOSINOPHIL # BLD AUTO: 0.14 X10*3/UL (ref 0–0.7)
EOSINOPHIL NFR BLD AUTO: 1.7 %
ERYTHROCYTE [DISTWIDTH] IN BLOOD BY AUTOMATED COUNT: 13.3 % (ref 11.5–14.5)
GFR SERPL CREATININE-BSD FRML MDRD: >90 ML/MIN/1.73M*2
GLUCOSE SERPL-MCNC: 98 MG/DL (ref 74–99)
HCT VFR BLD AUTO: 43.6 % (ref 36–46)
HGB BLD-MCNC: 13.7 G/DL (ref 12–16)
IMM GRANULOCYTES # BLD AUTO: 0.03 X10*3/UL (ref 0–0.7)
IMM GRANULOCYTES NFR BLD AUTO: 0.4 % (ref 0–0.9)
LYMPHOCYTES # BLD AUTO: 2.44 X10*3/UL (ref 1.2–4.8)
LYMPHOCYTES NFR BLD AUTO: 30.3 %
MCH RBC QN AUTO: 28.7 PG (ref 26–34)
MCHC RBC AUTO-ENTMCNC: 31.4 G/DL (ref 32–36)
MCV RBC AUTO: 91 FL (ref 80–100)
MONOCYTES # BLD AUTO: 0.42 X10*3/UL (ref 0.1–1)
MONOCYTES NFR BLD AUTO: 5.2 %
NEUTROPHILS # BLD AUTO: 4.97 X10*3/UL (ref 1.2–7.7)
NEUTROPHILS NFR BLD AUTO: 61.8 %
NRBC BLD-RTO: 0 /100 WBCS (ref 0–0)
PLATELET # BLD AUTO: 426 X10*3/UL (ref 150–450)
POTASSIUM SERPL-SCNC: 5.1 MMOL/L (ref 3.5–5.3)
PROT SERPL-MCNC: 8.4 G/DL (ref 6.4–8.2)
RBC # BLD AUTO: 4.77 X10*6/UL (ref 4–5.2)
SODIUM SERPL-SCNC: 138 MMOL/L (ref 136–145)
WBC # BLD AUTO: 8.1 X10*3/UL (ref 4.4–11.3)

## 2023-12-01 PROCEDURE — 80053 COMPREHEN METABOLIC PANEL: CPT

## 2023-12-01 PROCEDURE — 71046 X-RAY EXAM CHEST 2 VIEWS: CPT | Mod: COMPUTED RADIOGRAPHY X-RAY | Performed by: RADIOLOGY

## 2023-12-01 PROCEDURE — 73630 X-RAY EXAM OF FOOT: CPT | Mod: LEFT SIDE | Performed by: RADIOLOGY

## 2023-12-01 PROCEDURE — 99285 EMERGENCY DEPT VISIT HI MDM: CPT | Performed by: EMERGENCY MEDICINE

## 2023-12-01 PROCEDURE — 84484 ASSAY OF TROPONIN QUANT: CPT

## 2023-12-01 PROCEDURE — 73630 X-RAY EXAM OF FOOT: CPT | Mod: LT

## 2023-12-01 PROCEDURE — 99284 EMERGENCY DEPT VISIT MOD MDM: CPT | Performed by: EMERGENCY MEDICINE

## 2023-12-01 PROCEDURE — 36415 COLL VENOUS BLD VENIPUNCTURE: CPT

## 2023-12-01 PROCEDURE — 71046 X-RAY EXAM CHEST 2 VIEWS: CPT | Mod: FY

## 2023-12-01 PROCEDURE — 85025 COMPLETE CBC W/AUTO DIFF WBC: CPT

## 2023-12-01 ASSESSMENT — COLUMBIA-SUICIDE SEVERITY RATING SCALE - C-SSRS
1. IN THE PAST MONTH, HAVE YOU WISHED YOU WERE DEAD OR WISHED YOU COULD GO TO SLEEP AND NOT WAKE UP?: NO
6. HAVE YOU EVER DONE ANYTHING, STARTED TO DO ANYTHING, OR PREPARED TO DO ANYTHING TO END YOUR LIFE?: NO
2. HAVE YOU ACTUALLY HAD ANY THOUGHTS OF KILLING YOURSELF?: NO
1. IN THE PAST MONTH, HAVE YOU WISHED YOU WERE DEAD OR WISHED YOU COULD GO TO SLEEP AND NOT WAKE UP?: NO
6. HAVE YOU EVER DONE ANYTHING, STARTED TO DO ANYTHING, OR PREPARED TO DO ANYTHING TO END YOUR LIFE?: NO
2. HAVE YOU ACTUALLY HAD ANY THOUGHTS OF KILLING YOURSELF?: NO

## 2023-12-01 ASSESSMENT — PAIN SCALES - GENERAL: PAINLEVEL_OUTOF10: 6

## 2023-12-01 ASSESSMENT — PAIN DESCRIPTION - PROGRESSION: CLINICAL_PROGRESSION: NOT CHANGED

## 2023-12-01 ASSESSMENT — PAIN DESCRIPTION - ORIENTATION: ORIENTATION: LEFT

## 2023-12-01 ASSESSMENT — LIFESTYLE VARIABLES
HAVE PEOPLE ANNOYED YOU BY CRITICIZING YOUR DRINKING: NO
EVER FELT BAD OR GUILTY ABOUT YOUR DRINKING: NO
EVER HAD A DRINK FIRST THING IN THE MORNING TO STEADY YOUR NERVES TO GET RID OF A HANGOVER: NO
REASON UNABLE TO ASSESS: NO
HAVE YOU EVER FELT YOU SHOULD CUT DOWN ON YOUR DRINKING: NO

## 2023-12-01 ASSESSMENT — PAIN DESCRIPTION - FREQUENCY: FREQUENCY: CONSTANT/CONTINUOUS

## 2023-12-01 ASSESSMENT — PAIN DESCRIPTION - LOCATION: LOCATION: FOOT

## 2023-12-01 ASSESSMENT — PAIN - FUNCTIONAL ASSESSMENT: PAIN_FUNCTIONAL_ASSESSMENT: 0-10

## 2023-12-01 ASSESSMENT — PAIN DESCRIPTION - PAIN TYPE: TYPE: ACUTE PAIN

## 2023-12-01 ASSESSMENT — PAIN DESCRIPTION - ONSET: ONSET: ONGOING

## 2023-12-01 ASSESSMENT — PAIN DESCRIPTION - DESCRIPTORS: DESCRIPTORS: ACHING

## 2023-12-01 NOTE — ED TRIAGE NOTES
Patient states yesterday he hit his right foot on desk at home. Patient states he have damage to the toe nail. Pt is on xerelto. Pt denies fall. Pt denies LOC. Pt states he can't get into the doctors for another month

## 2023-12-01 NOTE — ED PROVIDER NOTES
HPI   Chief Complaint   Patient presents with    Foot Injury    Fall    Syncope       Patient is a 38-year-old female with past medical history of hypertension and POTS that presents to [facility] ED for syncopal fall in shower around 9 AM today resulting in left foot injury.  Patient reports that she has had previous episodes of syncope throughout her life, with last one being a few years ago.  Patient reports that she had passed out in the shower and injured her left foot.  Patient also complaining of generalized soreness of the left side body.  Patient does not recall if she hit her head.  Patient denies any headache, nausea, vomiting, fever, chills, shortness of breath, chest pain, or weakness.                          Sonali Coma Scale Score: 15         NIH Stroke Scale: 0          Patient History   Past Medical History:   Diagnosis Date    Allergic     Avascular necrosis of bone (CMS/HCC) 2019    both hips, from steroids    Disease of thyroid gland     Fibromyalgia     Hypertension     POTS (postural orthostatic tachycardia syndrome)      Past Surgical History:   Procedure Laterality Date    HYSTERECTOMY      1 ovary left    LABYRINTHECTOMY      OTHER SURGICAL HISTORY  01/07/2020    Laparoscopic hysterectomy    RENAL BIOPSY       Family History   Problem Relation Name Age of Onset    No Known Problems Mother       Social History     Tobacco Use    Smoking status: Never    Smokeless tobacco: Never   Vaping Use    Vaping Use: Never used   Substance Use Topics    Alcohol use: Yes     Comment: socially    Drug use: Never       Physical Exam   ED Triage Vitals   Temp Heart Rate Resp BP   12/01/23 1220 12/01/23 1220 12/01/23 1136 12/01/23 1136   36.4 °C (97.5 °F) 87 16 (!) 146/91      SpO2 Temp Source Heart Rate Source Patient Position   12/01/23 1220 12/01/23 1220 12/01/23 1220 12/01/23 1220   99 % Temporal Monitor Sitting      BP Location FiO2 (%)     12/01/23 1220 --     Right arm        Physical  Exam  Constitutional:       Appearance: Normal appearance. She is normal weight.   HENT:      Head: Normocephalic and atraumatic.      Nose: Nose normal.      Mouth/Throat:      Mouth: Mucous membranes are moist.      Pharynx: Oropharynx is clear.   Eyes:      Extraocular Movements: Extraocular movements intact.      Conjunctiva/sclera: Conjunctivae normal.      Pupils: Pupils are equal, round, and reactive to light.   Cardiovascular:      Rate and Rhythm: Normal rate and regular rhythm.      Pulses: Normal pulses.      Heart sounds: Normal heart sounds.   Pulmonary:      Effort: Pulmonary effort is normal.      Breath sounds: Normal breath sounds.   Abdominal:      General: Abdomen is flat. Bowel sounds are normal.      Palpations: Abdomen is soft.   Musculoskeletal:         General: Swelling and tenderness present. Normal range of motion.      Cervical back: Normal range of motion and neck supple.      Comments: Minor swelling, tenderness of the lateral left foot.   Skin:     General: Skin is warm and dry.      Capillary Refill: Capillary refill takes less than 2 seconds.   Neurological:      General: No focal deficit present.      Mental Status: She is alert and oriented to person, place, and time. Mental status is at baseline.   Psychiatric:         Mood and Affect: Mood normal.         Behavior: Behavior normal.         ED Course & MDM   Diagnoses as of 12/01/23 1446   Syncope and collapse   Sprain of left foot, initial encounter       Medical Decision Making  Patient is a 38 y.o. female who presents to [facility] ED for Foot Injury, Fall, and Syncope. On initial ED evaluation, patient found to be in no acute distress. Per HPI, concern to evaluate and treat for syncope, ACS rule out, left foot injury.  Obtaining CBC, CMP, chest x-ray, EKG, left foot x-ray, troponin.  CBC, CMP unremarkable.  Chest x-ray negative for any acute cardiopulmonary process.  Left foot x-ray negative for any acute fracture or subluxation.   EKG unremarkable, no acute ST changes.  Reassured patient and discussed findings.  Recommend supportive care, icing, rest, NSAIDs for pain.  Patient advised to follow-up with outpatient cardiology.  Referral provided.    Patient to follow up with outpatient cardiology and PCP. Anticipatory guidance and return precautions provided.  Patient otherwise stable for discharge.          Procedure  Procedures     Tree Ferris MD  Resident  12/01/23 2821

## 2023-12-01 NOTE — DISCHARGE INSTRUCTIONS
Seek immediate medical attention if you develop: new or worsening headache, nausea, vomiting, confusion, weakness, loss of motion in your arms or legs, loss of control of your urine or stool, difficulty waking from sleep, or any new or worsening symptoms.    Have someone check on you and wake you from sleep every 2 hours for the next 24 hours to make sure that you are doing well.    Seek immediate medical attention if you develop: increasing pain, numbness, tingling, weakness, loss of motion in your foot or toes, your foot or toes become pale or cold, or you develop any new or worsening symptoms.    Seek immediate medical attention if you develop: increasing pain, numbness, tingling, weakness, loss of motion in your arms or legs, loss of control of your urine or stool, fever, abdominal pain, chest pain, shortness of breath, or any new or worsening symptoms.

## 2023-12-01 NOTE — ED TRIAGE NOTES
Patient made it known to the triage nurse that she syncopal episode in th shower which caused the foot injury. Pt seen at urgent care and sent to the ER. Pt denies changes in vision or speech. Pt denies dizziness at this time. Pt recent dx POTS. Pt states injured left foot. Pt denies head injury

## 2023-12-04 ENCOUNTER — HOSPITAL ENCOUNTER (OUTPATIENT)
Dept: CARDIOLOGY | Facility: HOSPITAL | Age: 38
Discharge: HOME | End: 2023-12-04
Payer: COMMERCIAL

## 2023-12-04 PROCEDURE — 93005 ELECTROCARDIOGRAM TRACING: CPT

## 2023-12-06 ENCOUNTER — TELEPHONE (OUTPATIENT)
Dept: CARDIOLOGY | Facility: HOSPITAL | Age: 38
End: 2023-12-06
Payer: COMMERCIAL

## 2023-12-06 NOTE — TELEPHONE ENCOUNTER
I called patient with results of her echocardiogram and pulmonary function test.  Her echocardiogram shows low normal LV systolic function.  Her RV size and systolic function are normal.  Her estimated RV systolic pressures are at the high end of normal but are still normal.  There is no evidence of pulmonary hypertension.  She had a chest CT looking for pulmonary emboli in October and this was negative for pulmonary embolism.  Her PFTs show normal DLCO.  The likelihood of pulmonary hypertension and chronic thromboembolic pulmonary hypertension tension is very low given the normal DLCO and normal chest CT.  I do not think a ventilation/perfusion scan is needed at this point.  She has no evidence of obstructive lung disease and the remainder of her PFTs are normal with the exception of the ERV.  The ERV was 56% of normal however the remaining lung capacity measurements were all normal.  The pulmonologist I spoke with does not feel that this is a significant finding.  It could be related to her weight but it does not fit with a obvious musculoskeletal or neuromuscular disorder.  If her fatigue/dyspnea symptoms worsen however we might need to consider neuromuscular workup but again this seems very unlikely.    For now I recommended weaning off the metoprolol and see if she feels better off of metoprolol.  If her blood pressure rises we can make other choices for blood pressure control.  She was comfortable with this plan.    She will see me back in the office in 2-3 months or sooner by email if needed.    sdh

## 2023-12-06 NOTE — TELEPHONE ENCOUNTER
I called patient with results of her echo and PFTs.    Please schedule patient to see me in 2-3 months    SDH

## 2023-12-13 LAB
MGC ASCENT PFT - FEV1 - POST: 3.54
MGC ASCENT PFT - FEV1 - PRE: 3.51
MGC ASCENT PFT - FEV1 - PREDICTED: 3.49
MGC ASCENT PFT - FVC - POST: 4.34
MGC ASCENT PFT - FVC - PRE: 4.37
MGC ASCENT PFT - FVC - PREDICTED: 4.24

## 2024-01-08 ENCOUNTER — OFFICE VISIT (OUTPATIENT)
Dept: PRIMARY CARE | Facility: CLINIC | Age: 39
End: 2024-01-08
Payer: COMMERCIAL

## 2024-01-08 VITALS
HEIGHT: 69 IN | SYSTOLIC BLOOD PRESSURE: 141 MMHG | TEMPERATURE: 97.9 F | HEART RATE: 106 BPM | BODY MASS INDEX: 37.28 KG/M2 | WEIGHT: 251.7 LBS | DIASTOLIC BLOOD PRESSURE: 84 MMHG | OXYGEN SATURATION: 98 %

## 2024-01-08 DIAGNOSIS — U09.9 COVID-19 LONG HAULER: ICD-10-CM

## 2024-01-08 DIAGNOSIS — M79.7 FIBROMYALGIA: Primary | ICD-10-CM

## 2024-01-08 PROCEDURE — 1036F TOBACCO NON-USER: CPT | Performed by: PHYSICIAN ASSISTANT

## 2024-01-08 PROCEDURE — 99213 OFFICE O/P EST LOW 20 MIN: CPT | Performed by: PHYSICIAN ASSISTANT

## 2024-01-08 PROCEDURE — 3079F DIAST BP 80-89 MM HG: CPT | Performed by: PHYSICIAN ASSISTANT

## 2024-01-08 PROCEDURE — 3077F SYST BP >= 140 MM HG: CPT | Performed by: PHYSICIAN ASSISTANT

## 2024-01-08 NOTE — PROGRESS NOTES
"Subjective   Patient ID: Rajani Morton is a 38 y.o. female who presents for ADA.    HPI       Complains of post COVID symptoms and needs a leave work of absence document signed. Sx combination of fibromyalgia, sob, pots, heart issues, high pulse, nauseous in the am, exhausted.        ADA papers to fill out.   She would like get out of work early, be restricted with her continuous time on the phone, sitting, standing, and with days off to help her ease her sx.   She would also like to not go in to work at all on days she is flaring.  1 year santo  at this job is April .      Review of Systems  Constitutional: Patient denies any fever, chills, loss of appetite, or unexplained weight loss. FATIGUE, LOSS OF CONCENTRATION, BODY AND JOINT ACHES.  Cardiovascular: Patient denies any chest pain, shortness of breath with exertion, tachycardia, palpitations, orthopnea, or paroxysmal nocturnal dyspnea.  Respiratory: Patient denies any cough, shortness breath, or wheezing.  Gastrointestinal patient denies any nausea, vomiting, diarrhea, constipation, melena, hematochezia, or reflux symptoms  Skin: Denies any rashes or skin lesions   Neurology: Patient denies any new motor or sensory losses.  Denies any numbness, tingling, weakness, and incoordination of the extremities.  Patient also denies any tremor, seizures, or gait instability.  Endocrinology: Denies any polyuria, polydipsia, polyphagia, or heat/cold intolerance.    Objective   /84   Pulse 106   Temp 36.6 °C (97.9 °F)   Ht 1.753 m (5' 9\")   Wt 114 kg (251 lb 11.2 oz)   SpO2 98%   BMI 37.17 kg/m²     Physical Exam  Gen. appearance: Alert and cooperative, no acute distress, Velp, well-nourished obese female.  Neck: Supple and without adenopathy or rigidity.  There is no JVD at 90° and no carotid bruits are noted.  Cardiovascular: Heart has a regular rate and rhythm without murmur or ectopy.  Respiratory: Lungs are clear to auscultation bilaterally with good air " exchange.      Assessment/Plan   Diagnoses and all orders for this visit:  Fibromyalgia  -     Referral to Physical Therapy; Future  Patient is currently seeing a rheumatologist this Wednesday to establish.  Symptoms are causing her to have body aches, headaches, fatigue.  Please see attached impairment paperwork filled out for her today.  She is being referred to functional capacity for further evaluation of her incapacity.    COVID-19 long hauler  -     Referral to COVID Recovery Clinic; Future  Patient symptoms have all worsened since having COVID back in September.  She is being referred to the COVID recovery clinic.    Patient is to return to clinic as needed.    Patient understands that should they have testing outside   facilities that we may not receive the results and was told to call us if they have not heard from our office within a week after testing.    [facility] uses voice recognition technology for dictations. Sometimes the software misinterprets words. Please take this into account when reading this.

## 2024-01-09 SDOH — ECONOMIC STABILITY: INCOME INSECURITY: IN THE LAST 12 MONTHS, WAS THERE A TIME WHEN YOU WERE NOT ABLE TO PAY THE MORTGAGE OR RENT ON TIME?: YES

## 2024-01-09 SDOH — HEALTH STABILITY: PHYSICAL HEALTH: ON AVERAGE, HOW MANY DAYS PER WEEK DO YOU ENGAGE IN MODERATE TO STRENUOUS EXERCISE (LIKE A BRISK WALK)?: 0 DAYS

## 2024-01-09 SDOH — ECONOMIC STABILITY: HOUSING INSECURITY: IN THE LAST 12 MONTHS, HOW MANY PLACES HAVE YOU LIVED?: 2

## 2024-01-09 SDOH — ECONOMIC STABILITY: FOOD INSECURITY: WITHIN THE PAST 12 MONTHS, YOU WORRIED THAT YOUR FOOD WOULD RUN OUT BEFORE YOU GOT MONEY TO BUY MORE.: NEVER TRUE

## 2024-01-09 SDOH — ECONOMIC STABILITY: HOUSING INSECURITY
IN THE LAST 12 MONTHS, WAS THERE A TIME WHEN YOU DID NOT HAVE A STEADY PLACE TO SLEEP OR SLEPT IN A SHELTER (INCLUDING NOW)?: NO

## 2024-01-09 SDOH — HEALTH STABILITY: PHYSICAL HEALTH: ON AVERAGE, HOW MANY MINUTES DO YOU ENGAGE IN EXERCISE AT THIS LEVEL?: 0 MIN

## 2024-01-09 SDOH — ECONOMIC STABILITY: INCOME INSECURITY: HOW HARD IS IT FOR YOU TO PAY FOR THE VERY BASICS LIKE FOOD, HOUSING, MEDICAL CARE, AND HEATING?: NOT VERY HARD

## 2024-01-09 SDOH — ECONOMIC STABILITY: FOOD INSECURITY: WITHIN THE PAST 12 MONTHS, THE FOOD YOU BOUGHT JUST DIDN'T LAST AND YOU DIDN'T HAVE MONEY TO GET MORE.: NEVER TRUE

## 2024-01-09 SDOH — ECONOMIC STABILITY: TRANSPORTATION INSECURITY
IN THE PAST 12 MONTHS, HAS THE LACK OF TRANSPORTATION KEPT YOU FROM MEDICAL APPOINTMENTS OR FROM GETTING MEDICATIONS?: NO

## 2024-01-09 SDOH — ECONOMIC STABILITY: TRANSPORTATION INSECURITY
IN THE PAST 12 MONTHS, HAS LACK OF TRANSPORTATION KEPT YOU FROM MEETINGS, WORK, OR FROM GETTING THINGS NEEDED FOR DAILY LIVING?: NO

## 2024-01-09 ASSESSMENT — SLEEP AND FATIGUE QUESTIONNAIRES
FATIGUE_CAUSES_FREQUENT_PROBLEMTS: 6
FATIGUE_INTERFERES_RESPONSIBILITIES: 6
FATIGUE_INTERFERES_SOCIAL_LIFE: 6
EASILY_FATIGUED: 7 STRONGLY AGREE
FATIGUE_MOST_DISABILING_SYMPTOM: 6
EXERCISE_BRINGS_ON_FATIGUE: 7 STRONGLY AGREE
MY FATIGUE PREVENTS SUSTAINED PHYSICAL FUNCTIONING.: 7 STRONGLY AGREE
MY MOTIVATION IS LOWER WHEN I AM FATIGUED.: 7 STRONGLY AGREE
FATIGUE_INTERFERES_PHYSICAL_FUNCTIONING: 7 STRONGLY AGREE

## 2024-01-09 ASSESSMENT — SOCIAL DETERMINANTS OF HEALTH (SDOH)
WITHIN THE LAST YEAR, HAVE TO BEEN RAPED OR FORCED TO HAVE ANY KIND OF SEXUAL ACTIVITY BY YOUR PARTNER OR EX-PARTNER?: NO
IN A TYPICAL WEEK, HOW MANY TIMES DO YOU TALK ON THE PHONE WITH FAMILY, FRIENDS, OR NEIGHBORS?: THREE TIMES A WEEK
IN THE PAST 12 MONTHS, HAS THE ELECTRIC, GAS, OIL, OR WATER COMPANY THREATENED TO SHUT OFF SERVICE IN YOUR HOME?: NO
WITHIN THE LAST YEAR, HAVE YOU BEEN KICKED, HIT, SLAPPED, OR OTHERWISE PHYSICALLY HURT BY YOUR PARTNER OR EX-PARTNER?: NO
HOW OFTEN DO YOU ATTENT MEETINGS OF THE CLUB OR ORGANIZATION YOU BELONG TO?: PATIENT DECLINED
DO YOU BELONG TO ANY CLUBS OR ORGANIZATIONS SUCH AS CHURCH GROUPS UNIONS, FRATERNAL OR ATHLETIC GROUPS, OR SCHOOL GROUPS?: NO
HOW OFTEN DO YOU ATTEND CHURCH OR RELIGIOUS SERVICES?: NEVER
HOW OFTEN DO YOU GET TOGETHER WITH FRIENDS OR RELATIVES?: ONCE A WEEK
WITHIN THE LAST YEAR, HAVE YOU BEEN HUMILIATED OR EMOTIONALLY ABUSED IN OTHER WAYS BY YOUR PARTNER OR EX-PARTNER?: NO
WITHIN THE LAST YEAR, HAVE YOU BEEN AFRAID OF YOUR PARTNER OR EX-PARTNER?: NO

## 2024-01-09 ASSESSMENT — LIFESTYLE VARIABLES
SKIP TO QUESTIONS 9-10: 1
AUDIT-C TOTAL SCORE: 1
HOW OFTEN DO YOU HAVE A DRINK CONTAINING ALCOHOL: MONTHLY OR LESS
HOW MANY STANDARD DRINKS CONTAINING ALCOHOL DO YOU HAVE ON A TYPICAL DAY: 1 OR 2
HOW OFTEN DO YOU HAVE SIX OR MORE DRINKS ON ONE OCCASION: NEVER

## 2024-01-09 ASSESSMENT — PATIENT HEALTH QUESTIONNAIRE - PHQ9
10. IF YOU CHECKED OFF ANY PROBLEMS, HOW DIFFICULT HAVE THESE PROBLEMS MADE IT FOR YOU TO DO YOUR WORK, TAKE CARE OF THINGS AT HOME, OR GET ALONG WITH OTHER PEOPLE: SOMEWHAT DIFFICULT
1. LITTLE INTEREST OR PLEASURE IN DOING THINGS: MORE THAN HALF THE DAYS
3. TROUBLE FALLING OR STAYING ASLEEP OR SLEEPING TOO MUCH: MORE THAN HALF THE DAYS
4. FEELING TIRED OR HAVING LITTLE ENERGY: NEARLY EVERY DAY
SUM OF ALL RESPONSES TO PHQ QUESTIONS 1-9: 16
6. FEELING BAD ABOUT YOURSELF - OR THAT YOU ARE A FAILURE OR HAVE LET YOURSELF OR YOUR FAMILY DOWN: SEVERAL DAYS
5. POOR APPETITE OR OVEREATING: NEARLY EVERY DAY
7. TROUBLE CONCENTRATING ON THINGS, SUCH AS READING THE NEWSPAPER OR WATCHING TELEVISION: NEARLY EVERY DAY
8. MOVING OR SPEAKING SO SLOWLY THAT OTHER PEOPLE COULD HAVE NOTICED. OR THE OPPOSITE, BEING SO FIGETY OR RESTLESS THAT YOU HAVE BEEN MOVING AROUND A LOT MORE THAN USUAL: SEVERAL DAYS
2. FEELING DOWN, DEPRESSED OR HOPELESS: SEVERAL DAYS
9. THOUGHTS THAT YOU WOULD BE BETTER OFF DEAD, OR OF HURTING YOURSELF: NOT AT ALL
SUM OF ALL RESPONSES TO PHQ9 QUESTIONS 1 & 2: 3

## 2024-01-09 ASSESSMENT — ANXIETY QUESTIONNAIRES
1. FEELING NERVOUS, ANXIOUS, OR ON EDGE: SEVERAL DAYS
5. BEING SO RESTLESS THAT IT IS HARD TO SIT STILL: NOT AT ALL
6. BECOMING EASILY ANNOYED OR IRRITABLE: NOT AT ALL
2. NOT BEING ABLE TO STOP OR CONTROL WORRYING: SEVERAL DAYS
4. TROUBLE RELAXING: MORE THAN HALF THE DAYS
7. FEELING AFRAID AS IF SOMETHING AWFUL MIGHT HAPPEN: SEVERAL DAYS
IF YOU CHECKED OFF ANY PROBLEMS ON THIS QUESTIONNAIRE, HOW DIFFICULT HAVE THESE PROBLEMS MADE IT FOR YOU TO DO YOUR WORK, TAKE CARE OF THINGS AT HOME, OR GET ALONG WITH OTHER PEOPLE: SOMEWHAT DIFFICULT
GAD7 TOTAL SCORE: 6
3. WORRYING TOO MUCH ABOUT DIFFERENT THINGS: SEVERAL DAYS

## 2024-01-10 ENCOUNTER — APPOINTMENT (OUTPATIENT)
Dept: PRIMARY CARE | Facility: CLINIC | Age: 39
End: 2024-01-10
Payer: COMMERCIAL

## 2024-01-13 NOTE — PROGRESS NOTES
Subjective     Patient ID: [id number]   Rajani Morton is a 38 y.o. female, known to have fibromyalgia, migraine headaches, HTN, hypothyroidism, and obesity, who presents for fibromyalgia, referred by her PCP Shahab BIRCH        PSH:  Allergies:  Habits:  Social hx:    ROS:  Constitutional: Denies fever, chills, weight loss, night sweats or headaches  Eyes: Denies dry eyes, blurry vision, redness or pain  ENT: Denies dry mouth, dental loss, loss of taste, nasal or oral ulcers, jaw claudication, difficulty swallowing, nasal crusting or recurrent sinus infections   Cardiovascular: Denies chest pain, palpitations, orthopnea  Respiratory: Denies shortness of breath, cough, asthma, or recurrent respiratory infections  Gastrointestinal: Denies dysphagia, nausea, vomiting, heartburn, abdominal pain, constipation, diarrhea, melena or hematochezia  Genitourinary: No recurrent urinary infections or STDs, no genital or anal ulcers  Integumentary: Denies photosensitivity, rash or lesions, Raynaud's phenomenon, skin tightening, digital ulcers, psoriatic lesions, or alopecia  Neurological: Denies any numbness or tingling, muscle weakness, or incontinence   Hematologic/Lymphatic: Denies bleeding, bruising, history of clots (arterial or venous), or abortions/miscarriages/pregnancy complications   MSK: No joint pains, redness, hotness or swelling. No inflammatory back pain, enthesitis, dactylitis. No morning stiffness   Muscular: Denies weakness, difficulty rising from chair or combing the hair, muscle aches, or problems with hand    FHx: No family history of autoimmune diseases     Patient Active Problem List   Diagnosis    History of high blood pressure    History of thyroid disorder    Chronic pelvic pain in female    Endometriosis    Fibromyalgia    Essential hypertension    Fatigue    Hypothyroidism, acquired    Migraine headache    Obesity, Class I, BMI 30-34.9    Polyarthralgia     Past Medical History:    Diagnosis Date    Allergic     Avascular necrosis of bone (CMS/HCC) 2019    both hips, from steroids    Disease of thyroid gland     Fibromyalgia     Hypertension     POTS (postural orthostatic tachycardia syndrome)      Past Surgical History:   Procedure Laterality Date    HYSTERECTOMY      1 ovary left    LABYRINTHECTOMY      OTHER SURGICAL HISTORY  01/07/2020    Laparoscopic hysterectomy    RENAL BIOPSY       Social History     Socioeconomic History    Marital status:      Spouse name: Not on file    Number of children: Not on file    Years of education: Not on file    Highest education level: Not on file   Occupational History    Occupation: sell insurance from [employer]   Tobacco Use    Smoking status: Never    Smokeless tobacco: Never   Vaping Use    Vaping Use: Never used   Substance and Sexual Activity    Alcohol use: Yes     Comment: socially    Drug use: Yes     Types: Marijuana    Sexual activity: Yes     Partners: Male     Comment:    Other Topics Concern    Not on file   Social History Narrative    Not on file     Social Determinants of Health     Financial Resource Strain: Low Risk  (1/9/2024)    Overall Financial Resource Strain (CARDIA)     Difficulty of Paying Living Expenses: Not very hard   Food Insecurity: No Food Insecurity (1/9/2024)    Hunger Vital Sign     Worried About Running Out of Food in the Last Year: Never true     Ran Out of Food in the Last Year: Never true   Transportation Needs: No Transportation Needs (1/9/2024)    PRAPARE - Transportation     Lack of Transportation (Medical): No     Lack of Transportation (Non-Medical): No   Physical Activity: Inactive (1/9/2024)    Exercise Vital Sign     Days of Exercise per Week: 0 days     Minutes of Exercise per Session: 0 min   Stress: Stress Concern Present (1/9/2024)    [employer] - Occupational Stress Questionnaire     Feeling of Stress : To some extent   Social Connections: Moderately  Isolated (1/9/2024)    Social Connection and Isolation Panel [NHANES]     Frequency of Communication with Friends and Family: Three times a week     Frequency of Social Gatherings with Friends and Family: Once a week     Attends Rastafarian Services: Never     Active Member of Clubs or Organizations: No     Attends Club or Organization Meetings: Patient declined     Marital Status:    Intimate Partner Violence: Not At Risk (1/9/2024)    Humiliation, Afraid, Rape, and Kick questionnaire     Fear of Current or Ex-Partner: No     Emotionally Abused: No     Physically Abused: No     Sexually Abused: No   Housing Stability: High Risk (1/9/2024)    Housing Stability Vital Sign     Unable to Pay for Housing in the Last Year: Yes     Number of Places Lived in the Last Year: 2     Unstable Housing in the Last Year: No      Allergies   Allergen Reactions    Morphine Nausea/vomiting      Current Outpatient Medications:     azelastine (Astelin) 137 mcg (0.1 %) nasal spray, Administer 1 spray into each nostril 2 times a day. Use in each nostril as directed, Disp: , Rfl:     cetirizine (ZyrTEC) 10 mg chewable tablet, Chew once daily., Disp: , Rfl:     cyclobenzaprine (Flexeril) 10 mg tablet, Take 1 tablet (10 mg) by mouth 3 times a day as needed for muscle spasms., Disp: , Rfl:     ibuprofen 800 mg tablet, Take 1 tablet (800 mg) by mouth every 6 hours if needed for headaches, mild pain (1 - 3) or moderate pain (4 - 6)., Disp: , Rfl:     levothyroxine (Synthroid, Levoxyl) 50 mcg tablet, Take 1 tablet (50 mcg) by mouth once daily., Disp: , Rfl:     lisinopril 20 mg tablet, Take 1 tablet (20 mg) by mouth once daily., Disp: , Rfl:     omeprazole OTC (PriLOSEC OTC) 20 mg EC tablet, Take 1 tablet (20 mg) by mouth once daily. Do not crush, chew, or split., Disp: 30 tablet, Rfl: 11    polyethylene glycol (Miralax) packet, Take 17 g by mouth once daily as needed (as needed for constipation)., Disp: 30 packet, Rfl: 2    wheat dextrin  (Benefiber Healthy Shape) 5 gram/7.4 gram powder, Take 1 teaspoon daily, Disp: 248 g, Rfl: 1     Objective     Visit Vitals  Smoking Status Never     Physical Exam:  General: AAOx3, Cooperative  Head: normocephalic, atraumatic, no hair loss   Eyes: EOMI, conjunctiva clear, sclera white, anicteric  Ears: hearing intact  Nose: no deformity, no crusting   Throat/Mouth: No oral deformities, no cheek swelling, mucosa appear moist, no oral ulcers noted or loss of dentition   Neck/Lymph: FROM, trachea midline  Lungs: chest expansion symmetric, clear to auscultation bilaterally. No wheezing, rhonchi, or stridor  Heart: S1, S2, RRR. No murmur or rub  Abdomen: Soft, non-tender without masses  Neuro: CN II-XII grossly intact, no focal deficit  Skin: No rashes, ulcers or photosensitive areas  MSK: Upper Extremities:  Hand/Fingers: No erythema, edema, tenderness or warmth at DIP, PIP, or MCP joints, FROM grossly. Good hand . No nodules. No deformities   Wrists: No erythema, edema, warmth or tenderness at wrist, FROM grossly  Elbows: No tenderness, edema, erythema or warmth at elbows, FROM grossly. No nodules   Shoulders: No edema, erythema, tenderness or warmth at shoulders. FROM  Lower Extremities:   Hips: No obvious deformities. No joint tenderness, normal ROM grossly. Log roll test negative bilaterally. Matt test is negative bilaterally. No trochanteric bursae TTP  Knees: No tenderness, deformities, edema, rashes, or warmth, normal ROM grossly. No crepitus, no pes anserine bursa TTP   Ankles, feet: No deformities, tenderness, edema, erythema, ulceration, or warmth at the ankle or MTP/IP joints, normal ROM grossly  Spine: No spinal tenderness to palpation. No SI joint tenderness. Gaenslen test negative      Lab Results   Component Value Date    WBC 8.1 12/01/2023    HGB 13.7 12/01/2023    HCT 43.6 12/01/2023    MCV 91 12/01/2023     12/01/2023        Chemistry    Lab Results   Component Value Date/Time      12/01/2023 1303    K 5.1 12/01/2023 1303     12/01/2023 1303    CO2 29 12/01/2023 1303    BUN 12 12/01/2023 1303    CREATININE 0.78 12/01/2023 1303    Lab Results   Component Value Date/Time    CALCIUM 9.6 12/01/2023 1303    ALKPHOS 39 12/01/2023 1303    AST 40 (H) 12/01/2023 1303    ALT 16 12/01/2023 1303    BILITOT 0.4 12/01/2023 1303        Lab Results   Component Value Date    NEUTROABS 4.97 12/01/2023     Lab Results   Component Value Date    HEPBCIGM Nonreactive 10/24/2023    HEPCAB Nonreactive 10/24/2023      Lab Results   Component Value Date    ALT 16 12/01/2023    AST 40 (H) 12/01/2023    ALKPHOS 39 12/01/2023    BILITOT 0.4 12/01/2023      Lab Results   Component Value Date    CALCIUM 9.6 12/01/2023     Pulmonary function test  The FEV1/FVC is normal. The FEV1 is normal. The FVC is normal. The TLC by body plethysmography is normal. The DLCO is normal; however, the diffusing capacity was not corrected for the patient's hemoglobin. Following administration of bronchodilators, there is no significant response. Conclusion: Normal spirometry. Normal lung volumes by plethysmography. The DLCO is normal.     === 12/01/23 ===  XR FOOT 3+ VIEWS LEFT  No acute osseous abnormality.       Assessment/Plan    This is a  38 y.o. female, known to have fibromyalgia, migraine headaches, HTN, hypothyroidism, and obesity, who presents for fibromyalgia, referred by her PCP Mrs. Soria     Labs:  12/23: CMP wnl except AST 50, TSH, 1, 25 dihydroxyvitamin D , iron studies, CBC, hepatitis serology wnl    Imaging:  CT chest 10/23: No pulmonary artery emboli.  No acute cardiopulmonary disease. Fatty liver morphology.    CT abd 10/23: No acute findings    Xray foot 12/23: No acute fracture or dislocation. No significant soft tissue swelling.       Plan, including risks and benefits, was discussed with the patient, informed on how to reach us.     To schedule an appointment, call [phone]  To reach the rheumatology  office, call [phone]    Simran Alvarez MD   Division of Rheumatology  [facility]

## 2024-01-17 ENCOUNTER — APPOINTMENT (OUTPATIENT)
Dept: RHEUMATOLOGY | Facility: CLINIC | Age: 39
End: 2024-01-17
Payer: COMMERCIAL

## 2024-01-24 ENCOUNTER — OFFICE VISIT (OUTPATIENT)
Dept: CARDIOLOGY | Facility: CLINIC | Age: 39
End: 2024-01-24
Payer: COMMERCIAL

## 2024-01-24 VITALS
DIASTOLIC BLOOD PRESSURE: 81 MMHG | BODY MASS INDEX: 37.15 KG/M2 | HEIGHT: 69 IN | HEART RATE: 96 BPM | SYSTOLIC BLOOD PRESSURE: 121 MMHG | OXYGEN SATURATION: 98 % | WEIGHT: 250.8 LBS

## 2024-01-24 DIAGNOSIS — I10 ESSENTIAL HYPERTENSION: Primary | ICD-10-CM

## 2024-01-24 DIAGNOSIS — M79.7 FIBROMYALGIA: ICD-10-CM

## 2024-01-24 PROBLEM — K76.0 STEATOSIS OF LIVER: Status: ACTIVE | Noted: 2023-10-24

## 2024-01-24 PROBLEM — R06.02 SHORTNESS OF BREATH: Status: ACTIVE | Noted: 2023-10-11

## 2024-01-24 PROBLEM — R55 SYNCOPE AND COLLAPSE: Status: ACTIVE | Noted: 2024-01-24

## 2024-01-24 PROBLEM — S93.602A SPRAIN OF LEFT FOOT: Status: ACTIVE | Noted: 2024-01-24

## 2024-01-24 PROBLEM — R00.0 TACHYCARDIA: Status: ACTIVE | Noted: 2023-10-24

## 2024-01-24 PROCEDURE — 3074F SYST BP LT 130 MM HG: CPT | Performed by: INTERNAL MEDICINE

## 2024-01-24 PROCEDURE — 3079F DIAST BP 80-89 MM HG: CPT | Performed by: INTERNAL MEDICINE

## 2024-01-24 PROCEDURE — 99213 OFFICE O/P EST LOW 20 MIN: CPT | Performed by: INTERNAL MEDICINE

## 2024-01-24 PROCEDURE — 1036F TOBACCO NON-USER: CPT | Performed by: INTERNAL MEDICINE

## 2024-01-24 ASSESSMENT — PAIN SCALES - GENERAL: PAINLEVEL: 4

## 2024-01-25 LAB
ATRIAL RATE: 61 BPM
P AXIS: 31 DEGREES
P OFFSET: 196 MS
P ONSET: 144 MS
PR INTERVAL: 154 MS
Q ONSET: 221 MS
QRS COUNT: 10 BEATS
QRS DURATION: 92 MS
QT INTERVAL: 418 MS
QTC CALCULATION(BAZETT): 420 MS
QTC FREDERICIA: 420 MS
R AXIS: 39 DEGREES
T AXIS: 45 DEGREES
T OFFSET: 430 MS
VENTRICULAR RATE: 61 BPM

## 2024-01-31 ENCOUNTER — APPOINTMENT (OUTPATIENT)
Dept: NEUROLOGY | Facility: HOSPITAL | Age: 39
End: 2024-01-31
Payer: COMMERCIAL

## 2024-02-05 ENCOUNTER — OFFICE VISIT (OUTPATIENT)
Dept: PRIMARY CARE | Facility: CLINIC | Age: 39
End: 2024-02-05
Payer: COMMERCIAL

## 2024-02-05 VITALS
SYSTOLIC BLOOD PRESSURE: 120 MMHG | OXYGEN SATURATION: 98 % | HEART RATE: 107 BPM | BODY MASS INDEX: 36.69 KG/M2 | HEIGHT: 69 IN | WEIGHT: 247.7 LBS | TEMPERATURE: 98.8 F | DIASTOLIC BLOOD PRESSURE: 86 MMHG

## 2024-02-05 DIAGNOSIS — J34.89 SINUS PRESSURE: Primary | ICD-10-CM

## 2024-02-05 DIAGNOSIS — R09.82 POST-NASAL DRIP: ICD-10-CM

## 2024-02-05 PROCEDURE — 3079F DIAST BP 80-89 MM HG: CPT | Performed by: PHYSICIAN ASSISTANT

## 2024-02-05 PROCEDURE — 1036F TOBACCO NON-USER: CPT | Performed by: PHYSICIAN ASSISTANT

## 2024-02-05 PROCEDURE — 99213 OFFICE O/P EST LOW 20 MIN: CPT | Performed by: PHYSICIAN ASSISTANT

## 2024-02-05 PROCEDURE — 3074F SYST BP LT 130 MM HG: CPT | Performed by: PHYSICIAN ASSISTANT

## 2024-02-05 RX ORDER — METHYLPREDNISOLONE 4 MG/1
TABLET ORAL
Qty: 21 TABLET | Refills: 0 | Status: SHIPPED | OUTPATIENT
Start: 2024-02-05 | End: 2024-02-12 | Stop reason: WASHOUT

## 2024-02-05 RX ORDER — FLUTICASONE PROPIONATE 50 MCG
1 SPRAY, SUSPENSION (ML) NASAL DAILY
Qty: 16 G | Refills: 5 | Status: SHIPPED | OUTPATIENT
Start: 2024-02-05 | End: 2025-02-04

## 2024-02-05 ASSESSMENT — PATIENT HEALTH QUESTIONNAIRE - PHQ9
SUM OF ALL RESPONSES TO PHQ9 QUESTIONS 1 AND 2: 0
2. FEELING DOWN, DEPRESSED OR HOPELESS: NOT AT ALL
1. LITTLE INTEREST OR PLEASURE IN DOING THINGS: NOT AT ALL

## 2024-02-05 NOTE — PROGRESS NOTES
"Subjective   Patient ID: Rachana Morton is a 38 y.o. female who presents for Sinus Problem.    HPI   Complains of having sinus pressure since Saturday.   OTC she has tried IBP and tried Excedrin Migraine; Astelin BID (helps PND)  She is also having light post nasal drip, she has a mild cough, mucus is still clear.   She used steroids in the past to help with the sinus pressure and swelling.   NO fever, ST or nausea.   Occ using humidifier and that helps some.   She thinks she has long covid and that these sx are acute issues on top of her chronic issues.         Review of Systems  Constitutional: Patient denies any fever, chills, loss of appetite, or unexplained weight loss.  Cardiovascular: Denies any chest pain, shortness of breath with exertion, tachycardia, palpitations.  Respiratory: Patient denies any cough, shortness of breath, or wheezing.  Skin:  Denies any rashes or skin lesions.  SEE HPI    Objective   /86   Pulse 107   Temp 37.1 °C (98.8 °F)   Ht 1.753 m (5' 9\")   Wt 112 kg (247 lb 11.2 oz)   SpO2 98%   BMI 36.58 kg/m²     Physical Exam  General Appearance: Alert and cooperative, no acute distress, well-developed/well-nourished.  EENT:  Mucous membranes are moist, external auditory canals and tympanic membranes are within normal limits bilaterally, pharynx is unremarkable.  There is thin, clear nasal drainage noted bilaterally.  Neck: Supple and without adenopathy or rigidity.  Heart: Regular rate and rhythm without ectopy.  Lungs clear to auscultation bilaterally with good air exchange.    Assessment/Plan   Diagnoses and all orders for this visit:  Sinus pressure w/ worsened PND-  -     fluticasone (Flonase) 50 mcg/actuation nasal spray; Administer 1 spray into each nostril once daily. Shake gently. Before first use, prime pump. After use, clean tip and replace cap.  -     methylPREDNISolone (Medrol Dospak) 4 mg tablets; Take as directed on package.  Patient will add on Flonase 2-3 times daily " for symptom relief for the next 3 to 5 days and then decrease as symptoms improve.  She will also add on a Medrol Dosepak to help with pressure given that she does not tolerate Sudafed.  She will call if symptoms do not improve by day 5-7 and we will consider adding an antibiotic if needed.    Patient understands that should they have testing outside   facilities that we may not receive the results and was told to call us if they have not heard from our office within a week after testing.    [facility] uses voice recognition technology for dictations. Sometimes the software misinterprets words. Please take this into account when reading this.

## 2024-02-06 ENCOUNTER — TELEMEDICINE CLINICAL SUPPORT (OUTPATIENT)
Dept: OTHER | Facility: CLINIC | Age: 39
End: 2024-02-06
Payer: COMMERCIAL

## 2024-02-06 DIAGNOSIS — Z86.16 PERSONAL HISTORY OF COVID-19: ICD-10-CM

## 2024-02-06 RX ORDER — METOPROLOL TARTRATE 25 MG/1
25 TABLET, FILM COATED ORAL DAILY
COMMUNITY
End: 2024-02-12 | Stop reason: WASHOUT

## 2024-02-06 ASSESSMENT — MONTREAL COGNITIVE ASSESSMENT (MOCA)
6. READ LIST OF DIGITS [FORWARD/BACKWARD]: 2
WHAT IS THE TOTAL SCORE (OUT OF 30): 21
WHAT LEVEL OF EDUCATION WAS ATTAINED: 0
4. NAME EACH OF THE THREE ANIMALS SHOWN: 0
11. FOR EACH PAIR OF WORDS, WHAT CATEGORY DO THEY BELONG TO (OUT OF 2): 2
8. SERIAL SUBTRACTION OF 7S: 3
13. ORIENTATION SUBSCORE: 6
10. [FLUENCY] NAME WORDS STARTING WITH DESIGNATED LETTER: 1
9. REPEAT EACH SENTENCE: 2
VISUOSPATIAL/EXECUTIVE SUBSCORE: 0
7. [VIGILENCE] TAP WHEN HEARING DESIGNATED LETTER: 1
12. MEMORY INDEX SCORE: 4
5. MEMORY TRIALS: 0

## 2024-02-07 NOTE — PROGRESS NOTES
NPV Virtual The virtual visit conducted with Audio and Video.    Verbal consent was given for the following virtual visit, patient is currently located in [place]. All issues discussed and addressed below were done so without a physical examination. If it was felt the patient needed be seen in clinic in person they were directed there.    Subjective   COVID-19 Infection Date:  12/14/2020 confirmed via rapid home antigen test (sx: Fever, Fatigue, Loss or disturbed smell, Headache, Sore throat, Cough, Pain on breathing, Loss or disturbed taste, Muscle pain, Sinus pressure, Shortness of breath, Chest pain, Runny nose, Abdominal pain  - no hospitalization, treated with Augmentin and prednisone)  December 2021 confimed via rapid home antigen test (sx: Fever, Cough, Runny nose, Sore throat, Sinus Pressure - no hospitalization, treated with Augmentin and Prednisone for sinusitis)  Spring 2022 confirmed via rapid home antigen test (sx: Fever, Cough, Runny nose, Sore throat, Sinus Pressure  - no hospitalization, treated with Augmentin and prednisone for sinusitis)  9/20/2023 confirmed via rapid home antigen test (sx: Fever, Cough, Shortness of breath, Fatigue, Pain on breathing, Chest pain, Loss or disturbed smell, Loss or disturbed taste, Runny nose, Headache, Muscle pain, Abdominal pain, Diarrhea, Brain fog, Sore throat, Sinus pressure  - no hospitalization, treated with Prednisone for sinusitis)    COVID-19 vaccine status: Pfizer-Cynergen 11/20/21, 12/11/21, 5/17/22     Occupation: full-time  for [employer] working remotely    Current Providers: PCP Faustina Soria, Cardiology Dr. Aleman, GI CNP [facility]    Survey Scores: 01/2024  PHQ-9: 16    CHAITANYA-7: 6    Sleep Wellness: 9    FSS average: 6.56    Modified Ecog average: 2.17    MOCA: 21/22 (02/2024)  Overall Health: 50     38 y.o. female with a h/o COVID-19 in December 2020, December 2021, Spring 2022, September 2023, hypothyroidism, endometriosis,  fibromyalgia, POTS, HTN, migraines, obesity, IBS, presents to establish care at the [facility] with c/o sinus complaints, GI complaints, dizziness, palpitations and HR irregularities, pain, anxiety and depression, fatigue, brain fog, headache, tinnitus, smell and taste changes, shortness of breath, cough, chest pain, rashes/skin flushing, numbness and tingling, muscle cramps, tremors, muscle weakness.    Had COVID 4 times, each less less severe than the one prior until Septer which started her LC symptoms  Treated with antibiotics and prednisone for sinusitis with each episode  POTS diagnosis in the fall, dysautonomia symptoms have been bad, either ice cold or profusely sweating, HR fluctuating from   Was taking metoprolol prior to COVID, PCP started her on propranolol, cardiologist took her off all except lisinopril, BP has not been stable or consistent since COVID  Did a 24hr BP monitor and that showed that when sitting down and not moving around she had excellent readings, when up and moving then 140//110  POTS symptoms have worsened significantly since she was taken off metoprolol and propranolol  O2 will still drop to 80s when up and moving  Work-up was reassuring except for elevated BP, pulse was irregular  Has not had autonomic testing, cardiologist did orthostatics in the office  Has an appointment coming up with neurology for this  Difficulty tolerating showers, passed out, using shower chair  Often nauseated in the morning, no vomiting  IBS symptoms are a lot worse now, constipation and diarrhea, hx of IBS-C primarily  Following with GI, next appointment this Wednesday  Gallbladder attacks are more problematic  Hx of fibromyalgia, but a lot more leg pain since COVID, joint pain was already really bad there, feels like an isolated area in the muscles, 2-3 inch circles, moving around  Ibuprofen is not helpful  Hx of extensive pain  Normal swelling due to nephrotic syndrome when she  was 17  Sometimes wears compression stockings but usually super high compression leggings  Has been trying to add salt but hx of nephrotic symptoms is not used to any salt  Drinking element package (1g) per day and adding more salt to her food  Prior to COVID drank 120-150oz of water per day, less now because water makes her feel nauseated, around 80oz per day and feels dehydrated  Mentally taxed ema she has been feeling so miserable, struggling to get through the day  Has chronic health problems and was able to manage these better  Never had anxiety or depression before, now both of these  O2 is dropping when she is sleeping as well  Not sleeping well, exhausted all the time, nodding off when she sits, cannot fall asleep for 2-3 hours, typically stays asleep if nothing wakes her, when she is woken up then has difficulty getting back to sleep, average time asleep is 5-6 hours per day, naps on her day off  Snores, worse when having sinus problems  Not refreshed in the morning  Brain fog, has moments prior due to fibromyalgaia but now this is constant, has to keep notes, harder time comprehending, forgeting what she is doing walking into a room, slower learning than her typical at work  Got approved for ADA accommodations at work, can call off 3 days per month  Hard for her to make it through an 8hr day and 5 days per week  Headaches more often, at 2-3 times per week, ibuprofen or excedrine helps 40% of the time  75% on forehead, the rest at the base of her head, descrbes pain as stabbing and intense in the back of her head, dull and constant in her forehead  No vision changes, no hearing changes but has been having tinnitus which is new, may just be the left ear, intermittent, a little bit of ear discomfort, since COVID excessive amount of wax production  Smells sulphur and cigarette smoke often, taste has been messed up since first time she has had COVID, changes often, something that she lives today can taste  bad tomorrow, peanut butter or eggs   No mouth sores, a bit of dry mouth, sensitive eyes but no dry eyes  Face pain from sinus problems  Sinus problems, coughing, post-nasal drainage  Started Azelastine 1.5yrs ago, that has helped until the last COVID illness  Getting SOB, air hunger, was working out and losing weight before COVID  Now when going upstairs feels SOB, also when moving a lot such as getting ready for the day and she stops then gets hit with shortness of breath the most intense  Cough, worse when her O2 is lower or when really hot, dry cough  A lot of chest pain the past two week, new  High blood pressure a couple of times this weekend, center in her chest, comes and goes, worse when she eats but in different place than the gallbladder pain, sometimes wakes up with the pain, almost always worse when she eats, describes pain as sharp and stabbing  No acid reflux, took omeprazole for a while and it didn't help anything, stopped around a month ago after taking it straight for 2 months, next FUV with GI is on Wednesday  No sores  Getting butterfly rash often, gets redness on face or neck, unilateral, happens when she doesn't feel well  Butterfly rash has been happening intermittently for years  No abnormal bleeding or bruising, hx of bruising easily  No hypermobility  Numbness and tingling on left arm and hand intermittently, sometimes in right hand as well  Quickly getting numbness and tingling in legs when in a position for too long  Get charley horses in her whole body all the time, abdomen, back, side of back  Tremors hands are not holding things still, which is new  General muscle weakness with is new, opening a jar hurts her hand, often cannot do that    full ROS completed and all negative unless noted in HPI     Relevant prior healthcare visits:  -02/2024 PCP prescribed Flonase and medrol dose pack for sinus complaint  -01/2024 Cardiology notes HTN is not fully controlled but given fatigue and  hypotensive type symptoms recommends tracking this for now, dyspnea is of unclear etiology with unremarkable testing, recommends exercise, for POTS/fibromyalgia/LC recommends trying nontraditional therapies  -12/2023 Cardiology notes normal echocardiogram, PFTs reviewed with pulmonary who does not feel that abnormalities notes are significant, if dyspnea worsens then would consider neuromuscular work-up, recommends weaning off metoprolol to see if symptoms improved without  -12/2023 ED for syncope in shower and foot injury  -10/2023 GI ordered fibroscan for fatty liver, fiber supplement and miralax for IBS-C, omeprazole and gallbladder US for RUQ abdominal pain, referred to cardiology for HTN and tachycardia  -11/2022 Sleep Medicine [facility] EDS, recommends follow-up with rheumatology as scheduled, PSG and MSLT    Relevant prior diagnostic studies:  -12/2023 CXR with clear lungs  -11/2023 PFTs normal  -11/2023 FibroSCAN shows stage 0-1 disease and severe steatosis  -11/2023 echocardiogram with LVEF 50%, borderline pulmonary HTN with RVSP 33-35 mmHg, endocardial walls not well-visualized leading to inability to accurately calculation of EF with possible mild hypokinesis in the anterior lateral wall but this is very poorly visualized  -10/2023 US gallbladder unremarkable, prominent liver  -10/2023 CT abdomen and pelvis unremarkable   -10/2023 CTA chest shows no PE and clear lungs  -03/2023 colonoscopy normal    Relevant prior laboratory values, unremarkable unless noted:  -12/2023 Troponin, CMP, CBC/D  -10/2023 iron studies, Hepatitis panel, Anti-smooth muscle ab, anti-mitochondrial ab, [id number], Mag, Lipase, D-Dimer  705, Vitamin D, TSH  -10/2022 ferritin, T4, vitamin B12 377, JEET, CCP ab, sjogren ab, TPO ab, thyroglubin ab  -05/2022 HbA1c 4.8%, RF, CRP 1.3, ESR, uric acid    Exercise routine: none  Diet:  Weight hx: pre-COVID-19 230  lbs -> post-COVID-19 255  lbs  Substance use: former tobacco use, 1-2  servings ETOH monthly or less   Social:       Current Outpatient Medications:     azelastine (Astelin) 137 mcg (0.1 %) nasal spray, Administer 1 spray into each nostril 2 times a day. Use in each nostril as directed, Disp: , Rfl:     cetirizine (ZyrTEC) 10 mg chewable tablet, Chew once daily., Disp: , Rfl:     cyclobenzaprine (Flexeril) 10 mg tablet, Take 1 tablet (10 mg) by mouth 3 times a day as needed for muscle spasms., Disp: , Rfl:     fluticasone (Flonase) 50 mcg/actuation nasal spray, Administer 1 spray into each nostril once daily. Shake gently. Before first use, prime pump. After use, clean tip and replace cap., Disp: 16 g, Rfl: 5    ibuprofen 800 mg tablet, Take 1 tablet (800 mg) by mouth every 6 hours if needed for headaches, mild pain (1 - 3) or moderate pain (4 - 6)., Disp: , Rfl:     levothyroxine (Synthroid, Levoxyl) 50 mcg tablet, Take 1 tablet (50 mcg) by mouth once daily., Disp: , Rfl:     lisinopril 20 mg tablet, Take 1 tablet (20 mg) by mouth once daily., Disp: , Rfl:     wheat dextrin (Benefiber Healthy Shape) 5 gram/7.4 gram powder, Take 1 teaspoon daily, Disp: 248 g, Rfl: 1    escitalopram (Lexapro) 10 mg tablet, Take 1 tablet (10 mg) by mouth once daily., Disp: 30 tablet, Rfl: 2    propranolol (Inderal) 10 mg tablet, Take 1 tablet (10 mg) by mouth 2 times a day., Disp: 60 tablet, Rfl: 2    Past Medical History:   Diagnosis Date    Allergic     Avascular necrosis of bone (CMS/HCC) 2019    both hips, from steroids    Disease of thyroid gland     Fibromyalgia     Hypertension     POTS (postural orthostatic tachycardia syndrome)        Past Surgical History:   Procedure Laterality Date    HYSTERECTOMY      1 ovary left    LABYRINTHECTOMY      OTHER SURGICAL HISTORY  01/07/2020    Laparoscopic hysterectomy    RENAL BIOPSY         Family History   Problem Relation Name Age of Onset    No Known Problems Mother         Objective   There were no vitals taken for this visit.    Physical  Exam  Constitutional:       Comments: no acute distress, alert/conversational, appropriate affect, no focal neurological deficits noted via video appointment          Assessment/Plan   Problem List Items Addressed This Visit             ICD-10-CM       High    Post-acute sequelae of COVID-19 (PASC) - Primary U09.9     02/2024: sinus complaints, GI complaints, dizziness, palpitations and HR irregularities, pain, anxiety and depression, fatigue, brain fog, headache, tinnitus, smell and taste changes, shortness of breath, cough, chest pain, rashes/skin flushing, numbness and tingling, muscle cramps, tremors, muscle weakness  -comprehensive blood work, please have this drawn in the morning, fasting except for water   -tilt table test to rule out dysautonomia as a cause of your dizziness   -Home Sleep Study to rule out sleep apnea as a cause/contribution to your symptoms   -diaphragmatic breathing exercises  -referral to Cardiologist Dr. Fabian Kam  -restart propranolol at 10mg twice daily, we will consider increasing this dose depending on how you feel   -for anxiety and depression, I prescribed Lexapro 10mg daily and referred you to [facility] Psychologist Nu Fitzpatrick  -for sinus complaints, I referred you to rhinology   -I will reach out to the  neurologist with whom you are scheduled next month to ask if they treat dysautonomia                   Relevant Medications    escitalopram (Lexapro) 10 mg tablet    propranolol (Inderal) 10 mg tablet    Other Relevant Orders    Home sleep apnea test (HSAT)    Autonomic Testing    Referral to Psychology    Referral to Cardiology    Referral to ENT       Medium    Fatigue R53.83    Relevant Orders    Home sleep apnea test (HSAT)     Other Visit Diagnoses         Codes    COVID-19 long hauler     U09.9    Dizziness     R42    Relevant Orders    Autonomic Testing    Palpitations     R00.2    Relevant Medications    propranolol (Inderal) 10 mg tablet    Other Relevant  Orders    Autonomic Testing    Anxiety     F41.9    Relevant Medications    escitalopram (Lexapro) 10 mg tablet    Other Relevant Orders    Referral to Psychology    Depression, unspecified depression type     F32.A    Relevant Medications    escitalopram (Lexapro) 10 mg tablet    Other Relevant Orders    Referral to Psychology    Sinus complaint     R09.89    Relevant Orders    Referral to ENT

## 2024-02-10 SDOH — ECONOMIC STABILITY: INCOME INSECURITY: IN THE LAST 12 MONTHS, WAS THERE A TIME WHEN YOU WERE NOT ABLE TO PAY THE MORTGAGE OR RENT ON TIME?: YES

## 2024-02-10 SDOH — HEALTH STABILITY: PHYSICAL HEALTH: ON AVERAGE, HOW MANY MINUTES DO YOU ENGAGE IN EXERCISE AT THIS LEVEL?: 0 MIN

## 2024-02-10 SDOH — HEALTH STABILITY: PHYSICAL HEALTH: ON AVERAGE, HOW MANY DAYS PER WEEK DO YOU ENGAGE IN MODERATE TO STRENUOUS EXERCISE (LIKE A BRISK WALK)?: 0 DAYS

## 2024-02-10 SDOH — ECONOMIC STABILITY: FOOD INSECURITY: WITHIN THE PAST 12 MONTHS, THE FOOD YOU BOUGHT JUST DIDN’T LAST AND YOU DIDN’T HAVE MONEY TO GET MORE.: NEVER TRUE

## 2024-02-10 SDOH — ECONOMIC STABILITY: INCOME INSECURITY: HOW HARD IS IT FOR YOU TO PAY FOR THE VERY BASICS LIKE FOOD, HOUSING, MEDICAL CARE, AND HEATING?: NOT VERY HARD

## 2024-02-10 SDOH — ECONOMIC STABILITY: HOUSING INSECURITY: IN THE LAST 12 MONTHS, HOW MANY PLACES HAVE YOU LIVED?: 2

## 2024-02-10 SDOH — ECONOMIC STABILITY: FOOD INSECURITY: WITHIN THE PAST 12 MONTHS, YOU WORRIED THAT YOUR FOOD WOULD RUN OUT BEFORE YOU GOT MONEY TO BUY MORE.: NEVER TRUE

## 2024-02-10 SDOH — ECONOMIC STABILITY: FOOD INSECURITY: WITHIN THE PAST 12 MONTHS, THE FOOD YOU BOUGHT JUST DIDN'T LAST AND YOU DIDN'T HAVE MONEY TO GET MORE.: NEVER TRUE

## 2024-02-10 SDOH — HEALTH STABILITY: MENTAL HEALTH
STRESS IS WHEN SOMEONE FEELS TENSE, NERVOUS, ANXIOUS, OR CAN'T SLEEP AT NIGHT BECAUSE THEIR MIND IS TROUBLED. HOW STRESSED ARE YOU?: TO SOME EXTENT

## 2024-02-10 ASSESSMENT — ANXIETY QUESTIONNAIRES
IF YOU CHECKED OFF ANY PROBLEMS ON THIS QUESTIONNAIRE, HOW DIFFICULT HAVE THESE PROBLEMS MADE IT FOR YOU TO DO YOUR WORK, TAKE CARE OF THINGS AT HOME, OR GET ALONG WITH OTHER PEOPLE: SOMEWHAT DIFFICULT
4. TROUBLE RELAXING: MORE THAN HALF THE DAYS
1. FEELING NERVOUS, ANXIOUS, OR ON EDGE: SEVERAL DAYS
4. TROUBLE RELAXING: MORE THAN HALF THE DAYS
1. FEELING NERVOUS, ANXIOUS, OR ON EDGE: SEVERAL DAYS
6. BECOMING EASILY ANNOYED OR IRRITABLE: NOT AT ALL
6. BECOMING EASILY ANNOYED OR IRRITABLE: NOT AT ALL
3. WORRYING TOO MUCH ABOUT DIFFERENT THINGS: SEVERAL DAYS
2. NOT BEING ABLE TO STOP OR CONTROL WORRYING: SEVERAL DAYS
3. WORRYING TOO MUCH ABOUT DIFFERENT THINGS: SEVERAL DAYS
2. NOT BEING ABLE TO STOP OR CONTROL WORRYING: SEVERAL DAYS
7. FEELING AFRAID AS IF SOMETHING AWFUL MIGHT HAPPEN: SEVERAL DAYS
7. FEELING AFRAID AS IF SOMETHING AWFUL MIGHT HAPPEN: SEVERAL DAYS
GAD7 TOTAL SCORE: 6
IF YOU CHECKED OFF ANY PROBLEMS ON THIS QUESTIONNAIRE, HOW DIFFICULT HAVE THESE PROBLEMS MADE IT FOR YOU TO DO YOUR WORK, TAKE CARE OF THINGS AT HOME, OR GET ALONG WITH OTHER PEOPLE: SOMEWHAT DIFFICULT
5. BEING SO RESTLESS THAT IT IS HARD TO SIT STILL: NOT AT ALL
5. BEING SO RESTLESS THAT IT IS HARD TO SIT STILL: NOT AT ALL

## 2024-02-10 ASSESSMENT — SOCIAL DETERMINANTS OF HEALTH (SDOH)
WITHIN THE LAST YEAR, HAVE YOU BEEN HUMILIATED OR EMOTIONALLY ABUSED IN OTHER WAYS BY YOUR PARTNER OR EX-PARTNER?: NO
WITHIN THE LAST YEAR, HAVE TO BEEN RAPED OR FORCED TO HAVE ANY KIND OF SEXUAL ACTIVITY BY YOUR PARTNER OR EX-PARTNER?: NO
IN THE PAST 12 MONTHS, HAS THE ELECTRIC, GAS, OIL, OR WATER COMPANY THREATENED TO SHUT OFF SERVICE IN YOUR HOME?: NO
HOW OFTEN DO YOU ATTEND CHURCH OR RELIGIOUS SERVICES?: NEVER
IN A TYPICAL WEEK, HOW MANY TIMES DO YOU TALK ON THE PHONE WITH FAMILY, FRIENDS, OR NEIGHBORS?: THREE TIMES A WEEK
HOW OFTEN DO YOU ATTEND CHURCH OR RELIGIOUS SERVICES?: NEVER
WITHIN THE LAST YEAR, HAVE YOU BEEN KICKED, HIT, SLAPPED, OR OTHERWISE PHYSICALLY HURT BY YOUR PARTNER OR EX-PARTNER?: NO
WITHIN THE LAST YEAR, HAVE YOU BEEN AFRAID OF YOUR PARTNER OR EX-PARTNER?: NO
HOW OFTEN DO YOU ATTENT MEETINGS OF THE CLUB OR ORGANIZATION YOU BELONG TO?: PATIENT DECLINED
WITHIN THE LAST YEAR, HAVE TO BEEN RAPED OR FORCED TO HAVE ANY KIND OF SEXUAL ACTIVITY BY YOUR PARTNER OR EX-PARTNER?: NO
DO YOU BELONG TO ANY CLUBS OR ORGANIZATIONS SUCH AS CHURCH GROUPS UNIONS, FRATERNAL OR ATHLETIC GROUPS, OR SCHOOL GROUPS?: NO
IN THE PAST 12 MONTHS, HAS THE ELECTRIC, GAS, OIL, OR WATER COMPANY THREATENED TO SHUT OFF SERVICE IN YOUR HOME?: NO
IN A TYPICAL WEEK, HOW MANY TIMES DO YOU TALK ON THE PHONE WITH FAMILY, FRIENDS, OR NEIGHBORS?: THREE TIMES A WEEK
HOW OFTEN DO YOU GET TOGETHER WITH FRIENDS OR RELATIVES?: ONCE A WEEK
HOW OFTEN DO YOU GET TOGETHER WITH FRIENDS OR RELATIVES?: ONCE A WEEK
WITHIN THE LAST YEAR, HAVE YOU BEEN AFRAID OF YOUR PARTNER OR EX-PARTNER?: NO
WITHIN THE LAST YEAR, HAVE YOU BEEN KICKED, HIT, SLAPPED, OR OTHERWISE PHYSICALLY HURT BY YOUR PARTNER OR EX-PARTNER?: NO
WITHIN THE LAST YEAR, HAVE YOU BEEN HUMILIATED OR EMOTIONALLY ABUSED IN OTHER WAYS BY YOUR PARTNER OR EX-PARTNER?: NO
HOW OFTEN DO YOU ATTENT MEETINGS OF THE CLUB OR ORGANIZATION YOU BELONG TO?: PATIENT DECLINED
DO YOU BELONG TO ANY CLUBS OR ORGANIZATIONS SUCH AS CHURCH GROUPS UNIONS, FRATERNAL OR ATHLETIC GROUPS, OR SCHOOL GROUPS?: NO

## 2024-02-10 ASSESSMENT — PATIENT HEALTH QUESTIONNAIRE - PHQ9
9. THOUGHTS THAT YOU WOULD BE BETTER OFF DEAD, OR OF HURTING YOURSELF: NOT AT ALL
6. FEELING BAD ABOUT YOURSELF - OR THAT YOU ARE A FAILURE OR HAVE LET YOURSELF OR YOUR FAMILY DOWN: SEVERAL DAYS
10. IF YOU CHECKED OFF ANY PROBLEMS, HOW DIFFICULT HAVE THESE PROBLEMS MADE IT FOR YOU TO DO YOUR WORK, TAKE CARE OF THINGS AT HOME, OR GET ALONG WITH OTHER PEOPLE: VERY DIFFICULT
4. FEELING TIRED OR HAVING LITTLE ENERGY: NEARLY EVERY DAY
3. TROUBLE FALLING OR STAYING ASLEEP: MORE THAN HALF THE DAYS
6. FEELING BAD ABOUT YOURSELF - OR THAT YOU ARE A FAILURE OR HAVE LET YOURSELF OR YOUR FAMILY DOWN: SEVERAL DAYS
7. TROUBLE CONCENTRATING ON THINGS, SUCH AS READING THE NEWSPAPER OR WATCHING TELEVISION: NEARLY EVERY DAY
4. FEELING TIRED OR HAVING LITTLE ENERGY: NEARLY EVERY DAY
2. FEELING DOWN, DEPRESSED OR HOPELESS: SEVERAL DAYS
3. TROUBLE FALLING OR STAYING ASLEEP OR SLEEPING TOO MUCH: MORE THAN HALF THE DAYS
10. IF YOU CHECKED OFF ANY PROBLEMS, HOW DIFFICULT HAVE THESE PROBLEMS MADE IT FOR YOU TO DO YOUR WORK, TAKE CARE OF THINGS AT HOME, OR GET ALONG WITH OTHER PEOPLE: VERY DIFFICULT
7. TROUBLE CONCENTRATING ON THINGS, SUCH AS READING THE NEWSPAPER OR WATCHING TELEVISION: NEARLY EVERY DAY
1. LITTLE INTEREST OR PLEASURE IN DOING THINGS: MORE THAN HALF THE DAYS
SUM OF ALL RESPONSES TO PHQ9 QUESTIONS 1 & 2: 3
5. POOR APPETITE OR OVEREATING: NEARLY EVERY DAY
9. THOUGHTS THAT YOU WOULD BE BETTER OFF DEAD, OR OF HURTING YOURSELF: NOT AT ALL
8. MOVING OR SPEAKING SO SLOWLY THAT OTHER PEOPLE COULD HAVE NOTICED. OR THE OPPOSITE - BEING SO FIDGETY OR RESTLESS THAT YOU HAVE BEEN MOVING AROUND A LOT MORE THAN USUAL: SEVERAL DAYS
SUM OF ALL RESPONSES TO PHQ QUESTIONS 1-9: 16
5. POOR APPETITE OR OVEREATING: NEARLY EVERY DAY
2. FEELING DOWN, DEPRESSED OR HOPELESS: SEVERAL DAYS
8. MOVING OR SPEAKING SO SLOWLY THAT OTHER PEOPLE COULD HAVE NOTICED. OR THE OPPOSITE, BEING SO FIGETY OR RESTLESS THAT YOU HAVE BEEN MOVING AROUND A LOT MORE THAN USUAL: SEVERAL DAYS
1. LITTLE INTEREST OR PLEASURE IN DOING THINGS: MORE THAN HALF THE DAYS

## 2024-02-10 ASSESSMENT — LIFESTYLE VARIABLES
HOW OFTEN DO YOU HAVE 6 OR MORE DRINKS ON ONE OCCASION: NEVER
HOW MANY STANDARD DRINKS CONTAINING ALCOHOL DO YOU HAVE ON A TYPICAL DAY: PATIENT DOES NOT DRINK
AUDIT-C TOTAL SCORE: 1
HOW OFTEN DO YOU HAVE SIX OR MORE DRINKS ON ONE OCCASION: NEVER
SKIP TO QUESTIONS 9-10: 1
USE_ALCOHOL_TO_HELP_SLEEP: YES
HOW OFTEN DO YOU HAVE A DRINK CONTAINING ALCOHOL: MONTHLY OR LESS
HOW OFTEN DO YOU HAVE A DRINK CONTAINING ALCOHOL: MONTHLY OR LESS
DO_YOU_DRINK?: I DO THIS LESS OFTEN
HOW MANY STANDARD DRINKS CONTAINING ALCOHOL DO YOU HAVE ON A TYPICAL DAY: PATIENT DOES NOT DRINK

## 2024-02-10 ASSESSMENT — SLEEP AND FATIGUE QUESTIONNAIRES
FATIGUE_MOST_DISABILING_SYMPTOM: 6
FATIGUE_CAUSES_FREQUENT_PROBLEMTS: 6
FATIGUE_INTERFERES_PHYSICAL_FUNCTIONING: 7 STRONGLY AGREE
FATIGUE_CAUSES_FREQUENT_PROBLEMTS: 6
AVERAGE_FSS_SCORE: 6.56
FATIGUE_INTERFERES_SOCIAL_LIFE: 6
FATIGUE_INTERFERES_SOCIAL_LIFE: 6
FATIGUE_INTERFERES_RESPONSIBILITIES: 6
MY FATIGUE PREVENTS SUSTAINED PHYSICAL FUNCTIONING.: 7 STRONGLY AGREE
FATIGUE_INTERFERES_PHYSICAL_FUNCTIONING: 7 STRONGLY AGREE
MY MOTIVATION IS LOWER WHEN I AM FATIGUED.: 7 STRONGLY AGREE
FATIGUE_MOST_DISABILING_SYMPTOM: 6
EXERCISE_BRINGS_ON_FATIGUE: 7 STRONGLY AGREE
MY FATIGUE PREVENTS SUSTAINED PHYSICAL FUNCTIONING.: 7 STRONGLY AGREE
FATIGUE_INTERFERES_RESPONSIBILITIES: 6
EASILY_FATIGUED: 7 STRONGLY AGREE
VISUAL ANALOGUE FATIGUE SCALE (VAFS): 2
MY MOTIVATION IS LOWER WHEN I AM FATIGUED.: 7 STRONGLY AGREE
EXERCISE_BRINGS_ON_FATIGUE: 7 STRONGLY AGREE
EASILY_FATIGUED: 7 STRONGLY AGREE

## 2024-02-12 ENCOUNTER — TELEMEDICINE (OUTPATIENT)
Dept: OTHER | Facility: CLINIC | Age: 39
End: 2024-02-12
Payer: COMMERCIAL

## 2024-02-12 DIAGNOSIS — R42 DIZZINESS: ICD-10-CM

## 2024-02-12 DIAGNOSIS — U09.9 COVID-19 LONG HAULER: ICD-10-CM

## 2024-02-12 DIAGNOSIS — F41.9 ANXIETY: ICD-10-CM

## 2024-02-12 DIAGNOSIS — R09.89 SINUS COMPLAINT: ICD-10-CM

## 2024-02-12 DIAGNOSIS — R00.2 PALPITATIONS: ICD-10-CM

## 2024-02-12 DIAGNOSIS — U09.9 POST-ACUTE SEQUELAE OF COVID-19 (PASC): Primary | ICD-10-CM

## 2024-02-12 DIAGNOSIS — F32.A DEPRESSION, UNSPECIFIED DEPRESSION TYPE: ICD-10-CM

## 2024-02-12 DIAGNOSIS — R53.83 OTHER FATIGUE: ICD-10-CM

## 2024-02-12 PROCEDURE — 1036F TOBACCO NON-USER: CPT | Performed by: NURSE PRACTITIONER

## 2024-02-12 PROCEDURE — 99417 PROLNG OP E/M EACH 15 MIN: CPT | Performed by: NURSE PRACTITIONER

## 2024-02-12 PROCEDURE — 99417 PROLNG OP E/M EACH 15 MIN: CPT | Mod: ZK | Performed by: NURSE PRACTITIONER

## 2024-02-12 PROCEDURE — 99215 OFFICE O/P EST HI 40 MIN: CPT | Mod: ZK,95 | Performed by: NURSE PRACTITIONER

## 2024-02-12 PROCEDURE — 99205 OFFICE O/P NEW HI 60 MIN: CPT | Performed by: NURSE PRACTITIONER

## 2024-02-12 RX ORDER — ESCITALOPRAM OXALATE 10 MG/1
10 TABLET ORAL DAILY
Qty: 30 TABLET | Refills: 2 | Status: SHIPPED | OUTPATIENT
Start: 2024-02-12 | End: 2024-03-15

## 2024-02-12 RX ORDER — PROPRANOLOL HYDROCHLORIDE 10 MG/1
10 TABLET ORAL 2 TIMES DAILY
Qty: 60 TABLET | Refills: 2 | Status: SHIPPED | OUTPATIENT
Start: 2024-02-12 | End: 2024-03-06 | Stop reason: DRUGHIGH

## 2024-02-12 NOTE — ASSESSMENT & PLAN NOTE
02/2024: sinus complaints, GI complaints, dizziness, palpitations and HR irregularities, pain, anxiety and depression, fatigue, brain fog, headache, tinnitus, smell and taste changes, shortness of breath, cough, chest pain, rashes/skin flushing, numbness and tingling, muscle cramps, tremors, muscle weakness  -comprehensive blood work, please have this drawn in the morning, fasting except for water   -tilt table test to rule out dysautonomia as a cause of your dizziness   -Home Sleep Study to rule out sleep apnea as a cause/contribution to your symptoms   -diaphragmatic breathing exercises  -referral to Cardiologist Dr. Fabian Kam  -restart propranolol at 10mg twice daily, we will consider increasing this dose depending on how you feel   -for anxiety and depression, I prescribed Lexapro 10mg daily and referred you to [facility]ail[facility] Psychologist Nu Fitzpatrick  -for sinus complaints, I referred you to rhinology   -I will reach out to the  neurologist with whom you are scheduled next month to ask if they treat dysautonomia

## 2024-02-12 NOTE — PATIENT INSTRUCTIONS
It was my pleasure seeing you in the [facility] today.  We will focus on addressing the following symptoms discussed today: sinus complaints, GI complaints, dizziness, palpitations and HR irregularities, pain, anxiety and depression, fatigue, brain fog, headache, tinnitus, smell and taste changes, shortness of breath, cough, chest pain, rashes/skin flushing, numbness and tingling, muscle cramps, tremors, muscle weakness    My recommendations are as follows:  -comprehensive blood work, please have this drawn in the morning, fasting except for water   -tilt table test to rule out dysautonomia as a cause of your dizziness   -Home Sleep Study to rule out sleep apnea as a cause/contribution to your symptoms   -diaphragmatic breathing exercises  -referral to Cardiologist Dr. Fabian Kam  -restart propranolol at 10mg twice daily, we will consider increasing this dose depending on how you feel   -for anxiety and depression, I prescribed Lexapro 10mg daily and referred you to [facility] Psychologist Nu Fitzpatrick  -for sinus complaints, I referred you to rhinology   -I will reach out to the  neurologist with whom you are scheduled next month to ask if they treat dysautonomia    Tips to help improve brain fog and fatigue:  --avoid drinking Alcohol while recovering from COVID  --ensure to practice 30 minutes of exercise 7 days per week to keep BNDGF (brain derived neurotrophic growth factor) elevated as this will help in the regeneration of neurons, you may split exercise time up into 5 minute increments if this is better tolerated.   --Focus on eating a whole foods rich in fiber such as vegetables, fruits, beans, nuts, legumes, seeds, whole grains. Avoid processed foods and beverages. Eliminate added sugars, artificial sweeteners, processed oils, artificial dyes.  --slowly increase your activity by no more than 10% per week, rest when you feel tired  --utilize pacing techniques to manage fatigue, schedule rest  "times throughout the day so you do not run out of energy, more information to be found on this here: http://www.[facility].ca/health-info-site/Documents/post_covid-19_fatigue.pdf  --Review the  Health Talk on Managing Fatigue and Thinking Changes after COVID-19 here: https://www.hospitals.org/Health-Talks/articles/2022/05/managing-fatigue-and-thinking-changes-after-covid-19  --You can also find many helpful tips and tricks in this book: \"The Long COVID self-help guide, practical ways to manage symptoms\" by [facility] from the [facility] [place]  --another book you may find helpful: \"Clearing the Fog\" by Dr. Dawson Guardado     To help improve sleep:  --try Melatonin children's liquid drops 1-2mg underneath your tongue 30 minutes before you go to bed  --go to bed at the same time each night and get up at the same time each morning, including the weekend  --goal of 7-9 hours of uninterrupted sleep per night  --make sure your bedroom is quiet, dark, relaxing, and at a comfortable temperature  --remove electronic devices, such as TVs, computers, and smart phones, from your bedroom  --avoid caffeine after the morning and large meals before bed  --drink plenty of water throughout the day but avoid drinking fluids two hours before bed  --expose yourself to bright light in the morning  --engage in a calming bed-time routine of warm bath/shower, gratitude journal, guided meditation, etc.  --do not lay awake in bed for more than 20 minutes, get up and engage in a soothing activity such as reading a boring book until you feel sleepy     Tips to help with tinnitus:  -consider acupuncture treatments through [facility]  -consider using a journal to document triggers such as increased salt intake, caffeine, stress  -reduce exposure to NSAIDs (Ibuprofen, Naproxen, Advil)  -reduce alcohol intake  -avoid artificial sweeteners including aspartame  -use ear protection when exposed to loud noise  -Acupressure for " Tinnitus <https://www.[employer].com/watch?v=uM85ps9Pqt6&t=57s>   -Stress Management through mindfulness, yoga  -breath work with Vannessa flores- humming bee breath- 5 minutes, twice daily  https://www.[employer].com/watch?v=dAI95c8dGz2&t=88s     General headache recommendations:  -avoid common triggers which include red wine, dark beer, aged cheese, nuts, onions, chocolate, aspartame, processed meats and nitrates, excessive caffeine, caffeine withdrawal, fasting, skipping meals, barometric pressure change, bright light, poor air quality, odors  -avoid overuse of OTC medications such as Ibuprofen, Naproxen, Excedrin, etc. as this can lead to rebound headaches  -maintain a stable and consistent sleep schedule, even on weekends  -stay well-hydrated with non-caffeinated beverages  -avoid skipping r delaying meals  -aim for 30 minutes of movement per day, find something you enjoy so it doesn't feel like a chore  -Mind-Body and Stress Managements tools include acupuncture, chiropractic care, yoga, meditation, psychotherapy     Tips to help improve brain fog and fatigue:  --avoid drinking Alcohol while recovering from COVID  --ensure to practice 30 minutes of exercise 7 days per week to keep BNDGF (brain derived neurotrophic growth factor) elevated as this will help in the regeneration of neurons, you may split exercise time up into 5 minute increments if this is better tolerated.   --Focus on eating a whole foods rich in fiber such as vegetables, fruits, beans, nuts, legumes, seeds, whole grains. Avoid processed foods and beverages. Eliminate added sugars, artificial sweeteners, processed oils, artificial dyes.  --slowly increase your activity by no more than 10% per week, rest when you feel tired  --utilize pacing techniques to manage fatigue, schedule rest times throughout the day so you do not run out of energy, more information to be found on this here:  "http://www.[facility].ca/health-info-site/Documents/post_covid-19_fatigue.pdf  --Review the  Health Talk on Managing Fatigue and Thinking Changes after COVID-19 here: https://www.hospitals.org/Health-Talks/articles/2022/05/managing-fatigue-and-thinking-changes-after-covid-19  --You can also find many helpful tips and tricks in this book: \"The Long COVID self-help guide, practical ways to manage symptoms\" by [facility] from the [facility] [place]  --another book you may find helpful: \"Clearing the Fog\" by Dr. Dawson Guardado     General headache recommendations:  -avoid common triggers which include red wine, dark beer, aged cheese, nuts, onions, chocolate, aspartame, processed meats and nitrates, excessive caffeine, caffeine withdrawal, fasting, skipping meals, barometric pressure change, bright light, poor air quality, odors  -avoid overuse of OTC medications such as Ibuprofen, Naproxen, Excedrin, etc. as this can lead to rebound headaches  -maintain a stable and consistent sleep schedule, even on weekends  -stay well-hydrated with non-caffeinated beverages  -avoid skipping r delaying meals  -aim for 30 minutes of movement per day, find something you enjoy so it doesn't feel like a chore  -Mind-Body and Stress Managements tools include acupuncture, chiropractic care, yoga, meditation, psychotherapy     Tips to help with tinnitus:  -consider acupuncture treatments through [facility]  -consider using a journal to document triggers such as increased salt intake, caffeine, stress  -reduce exposure to NSAIDs (Ibuprofen, Naproxen, Advil)  -reduce alcohol intake  -avoid artificial sweeteners including aspartame  -use ear protection when exposed to loud noise  -Acupressure for Tinnitus <https://www.youtube.com/watch?v=jZ61ss6Xab2&t=57s>   -Stress Management through mindfulness, yoga  -breath work with Vannessa flores- humming bee breath- 5 minutes, twice daily  " https://www.[employer].com/watch?v=dPS23t4bCm2&t=88s     To help improve sleep:  --try Melatonin children's liquid drops 1-2mg underneath your tongue 30 minutes before you go to bed  --go to bed at the same time each night and get up at the same time each morning, including the weekend  --goal of 7-9 hours of uninterrupted sleep per night  --make sure your bedroom is quiet, dark, relaxing, and at a comfortable temperature  --remove electronic devices, such as TVs, computers, and smart phones, from your bedroom  --avoid caffeine after the morning and large meals before bed  --drink plenty of water throughout the day but avoid drinking fluids two hours before bed  --expose yourself to bright light in the morning  --engage in a calming bed-time routine of warm bath/shower, gratitude journal, guided meditation, etc.  --do not lay awake in bed for more than 20 minutes, get up and engage in a soothing activity such as reading a boring book until you feel sleepy     We will send a message in [employer] or call you with the results of your tests.  Further recommendations will follow based on testing results and your symptoms.   Please return to [facility] Recovery Clinic in 3 months, call [phone] or send a message through your [employer] luiz if needed.    Please also consider attending  [facility] support group for long-COVID and post-ICU patients. To contact support group, email [email or web address] or call [phone]

## 2024-02-14 ENCOUNTER — APPOINTMENT (OUTPATIENT)
Dept: GASTROENTEROLOGY | Facility: CLINIC | Age: 39
End: 2024-02-14
Payer: COMMERCIAL

## 2024-02-14 DIAGNOSIS — U09.9 POST-ACUTE SEQUELAE OF COVID-19 (PASC): Primary | ICD-10-CM

## 2024-02-16 ENCOUNTER — LAB (OUTPATIENT)
Dept: LAB | Facility: LAB | Age: 39
End: 2024-02-16
Payer: COMMERCIAL

## 2024-02-16 DIAGNOSIS — U09.9 POST-ACUTE SEQUELAE OF COVID-19 (PASC): ICD-10-CM

## 2024-02-16 LAB
25(OH)D3 SERPL-MCNC: 13 NG/ML (ref 30–100)
ALBUMIN SERPL BCP-MCNC: 4.3 G/DL (ref 3.4–5)
ALP SERPL-CCNC: 50 U/L (ref 33–110)
ALT SERPL W P-5'-P-CCNC: 12 U/L (ref 7–45)
ANION GAP SERPL CALC-SCNC: 12 MMOL/L (ref 10–20)
AST SERPL W P-5'-P-CCNC: 14 U/L (ref 9–39)
BASOPHILS # BLD AUTO: 0.04 X10*3/UL (ref 0–0.1)
BASOPHILS NFR BLD AUTO: 0.8 %
BILIRUB SERPL-MCNC: 0.7 MG/DL (ref 0–1.2)
BNP SERPL-MCNC: 20 PG/ML (ref 0–99)
BUN SERPL-MCNC: 7 MG/DL (ref 6–23)
CALCIUM SERPL-MCNC: 9.6 MG/DL (ref 8.6–10.3)
CARDIAC TROPONIN I PNL SERPL HS: <3 NG/L (ref 0–13)
CCP IGG SERPL-ACNC: 1 U/ML
CHLORIDE SERPL-SCNC: 102 MMOL/L (ref 98–107)
CK SERPL-CCNC: 63 U/L (ref 0–215)
CO2 SERPL-SCNC: 28 MMOL/L (ref 21–32)
CORTIS AM PEAK SERPL-MSCNC: 10.2 UG/DL (ref 5–20)
CREAT SERPL-MCNC: 0.7 MG/DL (ref 0.5–1.05)
CRP SERPL-MCNC: 1.28 MG/DL
D DIMER PPP FEU-MCNC: 840 NG/ML FEU
EGFRCR SERPLBLD CKD-EPI 2021: >90 ML/MIN/1.73M*2
EOSINOPHIL # BLD AUTO: 0.12 X10*3/UL (ref 0–0.7)
EOSINOPHIL NFR BLD AUTO: 2.5 %
ERYTHROCYTE [DISTWIDTH] IN BLOOD BY AUTOMATED COUNT: 13.8 % (ref 11.5–14.5)
ERYTHROCYTE [SEDIMENTATION RATE] IN BLOOD BY WESTERGREN METHOD: 34 MM/H (ref 0–20)
EST. AVERAGE GLUCOSE BLD GHB EST-MCNC: 108 MG/DL
FERRITIN SERPL-MCNC: 132 NG/ML (ref 8–150)
FOLATE SERPL-MCNC: 10.9 NG/ML
GLUCOSE SERPL-MCNC: 89 MG/DL (ref 74–99)
HBA1C MFR BLD: 5.4 %
HCT VFR BLD AUTO: 42 % (ref 36–46)
HGB BLD-MCNC: 13.7 G/DL (ref 12–16)
IMM GRANULOCYTES # BLD AUTO: 0.01 X10*3/UL (ref 0–0.7)
IMM GRANULOCYTES NFR BLD AUTO: 0.2 % (ref 0–0.9)
IRON SATN MFR SERPL: 50 % (ref 25–45)
IRON SERPL-MCNC: 149 UG/DL (ref 35–150)
LYMPHOCYTES # BLD AUTO: 1.53 X10*3/UL (ref 1.2–4.8)
LYMPHOCYTES NFR BLD AUTO: 32.3 %
MAGNESIUM SERPL-MCNC: 2.02 MG/DL (ref 1.6–2.4)
MCH RBC QN AUTO: 29.1 PG (ref 26–34)
MCHC RBC AUTO-ENTMCNC: 32.6 G/DL (ref 32–36)
MCV RBC AUTO: 89 FL (ref 80–100)
MONOCYTES # BLD AUTO: 0.41 X10*3/UL (ref 0.1–1)
MONOCYTES NFR BLD AUTO: 8.6 %
NEUTROPHILS # BLD AUTO: 2.63 X10*3/UL (ref 1.2–7.7)
NEUTROPHILS NFR BLD AUTO: 55.6 %
NRBC BLD-RTO: 0 /100 WBCS (ref 0–0)
PLATELET # BLD AUTO: 378 X10*3/UL (ref 150–450)
POTASSIUM SERPL-SCNC: 4 MMOL/L (ref 3.5–5.3)
PROT SERPL-MCNC: 7.5 G/DL (ref 6.4–8.2)
PROT SERPL-MCNC: 7.5 G/DL (ref 6.4–8.2)
RBC # BLD AUTO: 4.71 X10*6/UL (ref 4–5.2)
RHEUMATOID FACT SER NEPH-ACNC: <10 IU/ML (ref 0–15)
SODIUM SERPL-SCNC: 138 MMOL/L (ref 136–145)
TIBC SERPL-MCNC: 299 UG/DL (ref 240–445)
TSH SERPL-ACNC: 3.38 MIU/L (ref 0.44–3.98)
UIBC SERPL-MCNC: 150 UG/DL (ref 110–370)
VIT B12 SERPL-MCNC: 302 PG/ML (ref 211–911)
WBC # BLD AUTO: 4.7 X10*3/UL (ref 4.4–11.3)

## 2024-02-16 PROCEDURE — 86200 CCP ANTIBODY: CPT

## 2024-02-16 PROCEDURE — 84425 ASSAY OF VITAMIN B-1: CPT

## 2024-02-16 PROCEDURE — 83520 IMMUNOASSAY QUANT NOS NONAB: CPT

## 2024-02-16 PROCEDURE — 84155 ASSAY OF PROTEIN SERUM: CPT

## 2024-02-16 PROCEDURE — 83735 ASSAY OF MAGNESIUM: CPT

## 2024-02-16 PROCEDURE — 82746 ASSAY OF FOLIC ACID SERUM: CPT

## 2024-02-16 PROCEDURE — 86431 RHEUMATOID FACTOR QUANT: CPT

## 2024-02-16 PROCEDURE — 82607 VITAMIN B-12: CPT

## 2024-02-16 PROCEDURE — 85652 RBC SED RATE AUTOMATED: CPT

## 2024-02-16 PROCEDURE — 36415 COLL VENOUS BLD VENIPUNCTURE: CPT

## 2024-02-16 PROCEDURE — 83036 HEMOGLOBIN GLYCOSYLATED A1C: CPT

## 2024-02-16 PROCEDURE — 84165 PROTEIN E-PHORESIS SERUM: CPT

## 2024-02-16 PROCEDURE — 82306 VITAMIN D 25 HYDROXY: CPT

## 2024-02-16 PROCEDURE — 82533 TOTAL CORTISOL: CPT

## 2024-02-16 PROCEDURE — 83550 IRON BINDING TEST: CPT

## 2024-02-16 PROCEDURE — 80053 COMPREHEN METABOLIC PANEL: CPT

## 2024-02-16 PROCEDURE — 84484 ASSAY OF TROPONIN QUANT: CPT

## 2024-02-16 PROCEDURE — 86140 C-REACTIVE PROTEIN: CPT

## 2024-02-16 PROCEDURE — 83880 ASSAY OF NATRIURETIC PEPTIDE: CPT

## 2024-02-16 PROCEDURE — 86320 SERUM IMMUNOELECTROPHORESIS: CPT | Performed by: NURSE PRACTITIONER

## 2024-02-16 PROCEDURE — 85379 FIBRIN DEGRADATION QUANT: CPT

## 2024-02-16 PROCEDURE — 82728 ASSAY OF FERRITIN: CPT

## 2024-02-16 PROCEDURE — 84165 PROTEIN E-PHORESIS SERUM: CPT | Performed by: NURSE PRACTITIONER

## 2024-02-16 PROCEDURE — 84207 ASSAY OF VITAMIN B-6: CPT

## 2024-02-16 PROCEDURE — 85025 COMPLETE CBC W/AUTO DIFF WBC: CPT

## 2024-02-16 PROCEDURE — 86038 ANTINUCLEAR ANTIBODIES: CPT

## 2024-02-16 PROCEDURE — 84443 ASSAY THYROID STIM HORMONE: CPT

## 2024-02-16 PROCEDURE — 83540 ASSAY OF IRON: CPT

## 2024-02-16 PROCEDURE — 86334 IMMUNOFIX E-PHORESIS SERUM: CPT

## 2024-02-16 PROCEDURE — 82550 ASSAY OF CK (CPK): CPT

## 2024-02-19 DIAGNOSIS — R06.02 SHORTNESS OF BREATH: Primary | ICD-10-CM

## 2024-02-19 DIAGNOSIS — R79.89 D-DIMER, ELEVATED: ICD-10-CM

## 2024-02-19 DIAGNOSIS — E55.9 VITAMIN D DEFICIENCY: ICD-10-CM

## 2024-02-19 LAB
ANA SER QL HEP2 SUBST: NEGATIVE
TRYPTASE SERPL-MCNC: 3.6 UG/L

## 2024-02-19 RX ORDER — CHOLECALCIFEROL (VITAMIN D3) 1250 MCG
50000 TABLET ORAL
Qty: 12 TABLET | Refills: 0 | Status: SHIPPED | OUTPATIENT
Start: 2024-02-19 | End: 2024-05-31 | Stop reason: WASHOUT

## 2024-02-19 NOTE — PROGRESS NOTES
D-Dimer elevated in patient with SOB and new chest pain post-COVID-19, ordered CTA chest to rule out PE

## 2024-02-20 ENCOUNTER — HOSPITAL ENCOUNTER (OUTPATIENT)
Dept: RADIOLOGY | Facility: CLINIC | Age: 39
Discharge: HOME | End: 2024-02-20
Payer: COMMERCIAL

## 2024-02-20 DIAGNOSIS — R79.89 D-DIMER, ELEVATED: ICD-10-CM

## 2024-02-20 DIAGNOSIS — R06.02 SHORTNESS OF BREATH: ICD-10-CM

## 2024-02-20 LAB — PYRIDOXAL PHOS SERPL-SCNC: 51.4 NMOL/L (ref 20–125)

## 2024-02-20 PROCEDURE — 2550000001 HC RX 255 CONTRASTS: Performed by: NURSE PRACTITIONER

## 2024-02-20 PROCEDURE — 71275 CT ANGIOGRAPHY CHEST: CPT

## 2024-02-20 PROCEDURE — 71275 CT ANGIOGRAPHY CHEST: CPT | Performed by: RADIOLOGY

## 2024-02-20 RX ADMIN — IOHEXOL 75 ML: 350 INJECTION, SOLUTION INTRAVENOUS at 12:06

## 2024-02-21 ENCOUNTER — APPOINTMENT (OUTPATIENT)
Dept: GASTROENTEROLOGY | Facility: CLINIC | Age: 39
End: 2024-02-21
Payer: COMMERCIAL

## 2024-02-21 ENCOUNTER — APPOINTMENT (OUTPATIENT)
Dept: CARDIOLOGY | Facility: CLINIC | Age: 39
End: 2024-02-21
Payer: COMMERCIAL

## 2024-02-21 ENCOUNTER — HOSPITAL ENCOUNTER (OUTPATIENT)
Dept: NEUROLOGY | Facility: HOSPITAL | Age: 39
Discharge: HOME | End: 2024-02-21
Payer: COMMERCIAL

## 2024-02-21 DIAGNOSIS — R00.2 PALPITATIONS: ICD-10-CM

## 2024-02-21 DIAGNOSIS — R42 DIZZINESS: ICD-10-CM

## 2024-02-21 DIAGNOSIS — U09.9 POST-ACUTE SEQUELAE OF COVID-19 (PASC): ICD-10-CM

## 2024-02-21 LAB
ALBUMIN: 4.1 G/DL (ref 3.4–5)
ALPHA 1 GLOBULIN: 0.3 G/DL (ref 0.2–0.6)
ALPHA 2 GLOBULIN: 0.9 G/DL (ref 0.4–1.1)
BETA GLOBULIN: 0.9 G/DL (ref 0.5–1.2)
GAMMA GLOBULIN: 1.3 G/DL (ref 0.5–1.4)
IMMUNOFIXATION COMMENT: NORMAL
PATH REVIEW - SERUM IMMUNOFIXATION: NORMAL
PATH REVIEW-SERUM PROTEIN ELECTROPHORESIS: NORMAL
PROTEIN ELECTROPHORESIS COMMENT: NORMAL
VIT B1 PYROPHOSHATE BLD-SCNC: 105 NMOL/L (ref 70–180)

## 2024-02-21 PROCEDURE — 95923 AUTONOMIC NRV SYST FUNJ TEST: CPT | Performed by: STUDENT IN AN ORGANIZED HEALTH CARE EDUCATION/TRAINING PROGRAM

## 2024-02-21 PROCEDURE — 95924 ANS PARASYMP & SYMP W/TILT: CPT | Performed by: STUDENT IN AN ORGANIZED HEALTH CARE EDUCATION/TRAINING PROGRAM

## 2024-02-23 ENCOUNTER — LAB (OUTPATIENT)
Dept: LAB | Facility: LAB | Age: 39
End: 2024-02-23
Payer: COMMERCIAL

## 2024-02-23 DIAGNOSIS — M24.80 GENERALIZED HYPERMOBILITY OF JOINTS: Primary | ICD-10-CM

## 2024-02-23 DIAGNOSIS — G90.A POTS (POSTURAL ORTHOSTATIC TACHYCARDIA SYNDROME): Primary | ICD-10-CM

## 2024-02-23 DIAGNOSIS — G90.A POTS (POSTURAL ORTHOSTATIC TACHYCARDIA SYNDROME): ICD-10-CM

## 2024-02-23 LAB
HCYS SERPL-SCNC: 12.93 UMOL/L (ref 5–13.9)
PROT UR-ACNC: <4 MG/DL (ref 5–25)
T3 SERPL-MCNC: 130 NG/DL (ref 60–200)
T4 SERPL-MCNC: 9 UG/DL (ref 4.5–11.1)
THYROPEROXIDASE AB SERPL-ACNC: 36 IU/ML

## 2024-02-23 PROCEDURE — 83918 ORGANIC ACIDS TOTAL QUANT: CPT

## 2024-02-23 PROCEDURE — 86255 FLUORESCENT ANTIBODY SCREEN: CPT

## 2024-02-23 PROCEDURE — 83519 RIA NONANTIBODY: CPT

## 2024-02-23 PROCEDURE — 86335 IMMUNFIX E-PHORSIS/URINE/CSF: CPT

## 2024-02-23 PROCEDURE — 82085 ASSAY OF ALDOLASE: CPT

## 2024-02-23 PROCEDURE — 83090 ASSAY OF HOMOCYSTEINE: CPT

## 2024-02-23 PROCEDURE — 86800 THYROGLOBULIN ANTIBODY: CPT

## 2024-02-23 PROCEDURE — 84436 ASSAY OF TOTAL THYROXINE: CPT

## 2024-02-23 PROCEDURE — 86738 MYCOPLASMA ANTIBODY: CPT

## 2024-02-23 PROCEDURE — 82542 COL CHROMOTOGRAPHY QUAL/QUAN: CPT

## 2024-02-23 PROCEDURE — 83921 ORGANIC ACID SINGLE QUANT: CPT

## 2024-02-23 PROCEDURE — 82525 ASSAY OF COPPER: CPT

## 2024-02-23 PROCEDURE — 82384 ASSAY THREE CATECHOLAMINES: CPT

## 2024-02-23 PROCEDURE — 86325 OTHER IMMUNOELECTROPHORESIS: CPT | Performed by: NURSE PRACTITIONER

## 2024-02-23 PROCEDURE — 36415 COLL VENOUS BLD VENIPUNCTURE: CPT

## 2024-02-23 PROCEDURE — 84446 ASSAY OF VITAMIN E: CPT

## 2024-02-23 PROCEDURE — 84166 PROTEIN E-PHORESIS/URINE/CSF: CPT | Performed by: NURSE PRACTITIONER

## 2024-02-23 PROCEDURE — 84166 PROTEIN E-PHORESIS/URINE/CSF: CPT

## 2024-02-23 PROCEDURE — 84156 ASSAY OF PROTEIN URINE: CPT

## 2024-02-23 PROCEDURE — 84480 ASSAY TRIIODOTHYRONINE (T3): CPT

## 2024-02-23 PROCEDURE — 86376 MICROSOMAL ANTIBODY EACH: CPT

## 2024-02-23 NOTE — PROGRESS NOTES
"Patient concerned for EDS given \"pain and stiffness in my joints, I do sprain my wrists and ankles often (this is far from new for me), poor balance and coordination, stretchy skin, bruise easily, can make “prayer hands” behind my back, etc\". Referred to Genetics for further evaluation  "

## 2024-02-25 LAB
ALDOLASE SERPL-CCNC: 2.9 U/L (ref 1.2–7.6)
THYROGLOB AB SERPL-ACNC: <0.9 IU/ML (ref 0–4)

## 2024-02-26 LAB
COPPER SERPL-MCNC: 154.3 UG/DL (ref 80–155)
M PNEUMO IGG SER IA-ACNC: 0.89 U/L
M PNEUMO IGM SER IA-ACNC: 0.14 U/L
PCA IGG SER-ACNC: 3.6 UNITS (ref 0–24.9)

## 2024-02-27 ENCOUNTER — TELEPHONE (OUTPATIENT)
Dept: OTHER | Facility: CLINIC | Age: 39
End: 2024-02-27
Payer: COMMERCIAL

## 2024-02-27 LAB
A-TOCOPHEROL VIT E SERPL-MCNC: 9.8 MG/L (ref 5.5–18)
ALBUMIN MFR UR ELPH: 34 %
ALPHA1 GLOB MFR UR ELPH: 8.1 %
ALPHA2 GLOB MFR UR ELPH: 16.8 %
B-GLOBULIN MFR UR ELPH: 19.8 %
BETA+GAMMA TOCOPHEROL SERPL-MCNC: 1.7 MG/L (ref 0–6)
GAMMA GLOB MFR UR ELPH: 21.3 %
IMMUNOFIXATION COMMENT: NORMAL
PATH REVIEW - URINE IMMUNOFIXATION: NORMAL
PATH REVIEW-URINE PROTEIN ELECTROPHORESIS: NORMAL
URINE ELECTROPHORESIS COMMENT: NORMAL

## 2024-02-27 NOTE — TELEPHONE ENCOUNTER
Peer to peer with [employer] [phone] case # [id number] requested for denied test site  P2P is set up for 2/28 at 7am with Dr. Johanna Rosa

## 2024-02-28 ENCOUNTER — APPOINTMENT (OUTPATIENT)
Dept: PHYSICAL THERAPY | Facility: CLINIC | Age: 39
End: 2024-02-28
Payer: COMMERCIAL

## 2024-02-28 ENCOUNTER — LAB (OUTPATIENT)
Dept: LAB | Facility: LAB | Age: 39
End: 2024-02-28
Payer: COMMERCIAL

## 2024-02-28 DIAGNOSIS — G90.A POTS (POSTURAL ORTHOSTATIC TACHYCARDIA SYNDROME): ICD-10-CM

## 2024-02-28 LAB
3OH-DODECANOYLCARN SERPL-SCNC: <0.01 UMOL/L
3OH-ISOVALERYLCARN SERPL-SCNC: 0.02 UMOL/L
3OH-LINOLEOYLCARN SERPL-SCNC: <0.01 UMOL/L
3OH-OLEOYLCARN SERPL-SCNC: <0.01 UMOL/L
3OH-PALMITOLEYLCARN SERPL-SCNC: <0.01 UMOL/L
3OH-PALMITOYLCARN SERPL-SCNC: <0.01 UMOL/L
3OH-STEAROYLCARN SERPL-SCNC: <0.01 UMOL/L
3OH-TDECANOYLCARN SERPL-SCNC: <0.01 UMOL/L
3OH-TDECENOYLCARN SERPL-SCNC: <0.01 UMOL/L
ACETYLCARN SERPL-SCNC: 4.07 UMOL/L (ref 2.93–15.06)
ACYLCARNITINE PATTERN SERPL-IMP: NORMAL
BUTYRYL+ISOBUTYRYLCARN SERPL-SCNC: 0.18 UMOL/L
CARN ESTERS SERPL-SCNC: 11 UMOL/L (ref 5–29)
CARN ESTERS/C0 SERPL-SRTO: 0.3 RATIO (ref 0.1–1)
CARNITINE FREE SERPL-SCNC: 33 UMOL/L (ref 25–60)
CARNITINE SERPL-SCNC: 44 UMOL/L (ref 34–86)
COPPER UR-MCNC: 6 UG/L
COPPER/CREAT UR: 9 UG/G CREAT (ref 0–49)
CREAT UR-MCNC: 0.66 G/L (ref 0.3–3)
DECANOYLCARN SERPL-SCNC: 0.06 UMOL/L
DECENOYLCARN SERPL-SCNC: 0.06 UMOL/L
DODECANOYLCARN SERPL-SCNC: 0.03 UMOL/L
DODECENOYLCARN SERPL-SCNC: 0.02 UMOL/L
GLUCOSE 1H P 75 G GLC PO SERPL-MCNC: 159 MG/DL
GLUCOSE 2H P 75 G GLC PO SERPL-MCNC: 119 MG/DL
GLUCOSE 3H P 75 G GLC PO SERPL-MCNC: 61 MG/DL
GLUCOSE P FAST SERPL-MCNC: 88 MG/DL
GLUTARYLCARN SERPL-SCNC: 0.07 UMOL/L
HEXANOYLCARN SERPL-SCNC: 0.05 UMOL/L
ISOVALERYL+MEBUTYRYLCARN SERPL-SCNC: 0.11 UMOL/L
LINOLEOYLCARN SERPL-SCNC: 0.02 UMOL/L
METHYLMALONATE SERPL-SCNC: 0.14 UMOL/L (ref 0–0.4)
OCTANOYLCARN SERPL-SCNC: 0.05 UMOL/L
OCTENOYLCARN SERPL-SCNC: 0.32 UMOL/L
OLEOYLCARN SERPL-SCNC: 0.05 UMOL/L
PALMITOLEYLCARN SERPL-SCNC: 0.01 UMOL/L
PALMITOYLCARN SERPL-SCNC: 0.05 UMOL/L
PROPIONYLCARN SERPL-SCNC: 0.47 UMOL/L
STEAROYLCARN SERPL-SCNC: 0.02 UMOL/L
TDECADIENOYLCARN SERPL-SCNC: 0.01 UMOL/L
TDECANOYLCARN SERPL-SCNC: 0.01 UMOL/L
TDECENOYLCARN SERPL-SCNC: 0.02 UMOL/L

## 2024-02-28 PROCEDURE — 82595 ASSAY OF CRYOGLOBULIN: CPT

## 2024-02-28 PROCEDURE — 36415 COLL VENOUS BLD VENIPUNCTURE: CPT

## 2024-02-28 PROCEDURE — 82952 GTT-ADDED SAMPLES: CPT

## 2024-02-28 PROCEDURE — 83835 ASSAY OF METANEPHRINES: CPT

## 2024-02-28 PROCEDURE — 84110 ASSAY OF PORPHOBILINOGEN: CPT

## 2024-02-28 PROCEDURE — 82951 GLUCOSE TOLERANCE TEST (GTT): CPT

## 2024-02-29 LAB — SCAN RESULT: NORMAL

## 2024-03-02 LAB
DOPAMINE SERPL-MCNC: <30 PG/ML (ref 0–48)
EPINEPH PLAS-MCNC: 50 PG/ML (ref 0–62)
NOREPINEPH PLAS-MCNC: 831 PG/ML (ref 0–874)

## 2024-03-03 ENCOUNTER — CLINICAL SUPPORT (OUTPATIENT)
Dept: SLEEP MEDICINE | Facility: HOSPITAL | Age: 39
End: 2024-03-03
Payer: COMMERCIAL

## 2024-03-03 DIAGNOSIS — R53.83 OTHER FATIGUE: ICD-10-CM

## 2024-03-03 DIAGNOSIS — U09.9 POST-ACUTE SEQUELAE OF COVID-19 (PASC): ICD-10-CM

## 2024-03-03 LAB
2OXO3ME-VALERATE/CREAT UR-SRTO: 1 (ref 0–10)
2OXOISOCAPROATE/CREAT UR-SRTO: NOT DETECTED (ref 0–4)
2OXOISOVALERATE/CREAT UR-SRTO: NOT DETECTED (ref 0–4)
4OH-PHENYLACETATE/CREAT UR-SRTO: 11 (ref 0–25)
4OH-PHENYLLACTATE/CREAT UR-SRTO: NOT DETECTED (ref 0–4)
4OH-PHENYLPYRUVATE/CREAT UR-SRTO: ABNORMAL (ref 0–2)
A-KETOGLUT/CREAT UR-SRTO: 26 (ref 0–75)
ACETOACET/CREAT UR-SRTO: NOT DETECTED (ref 0–4)
ADIPATE/CREAT UR-SRTO: 2 (ref 0–35)
AMPHIPHYSIN IGG SER QL IA: NEGATIVE
ANNOTATION COMMENT IMP: NORMAL
B-OH-BUTYR/CREAT UR-SRTO: 2 (ref 0–4)
CREAT UR-MCNC: 71 MG/DL
CV2 AB SERPL QL IF: NEGATIVE
ETHYLMALONATE/CREAT UR-SRTO: 1 (ref 0–4)
FUMARATE/CREAT UR-SRTO: NOT DETECTED (ref 0–4)
GLIAL NUC TYPE 1 AB SER QL IF: NEGATIVE
HU1 AB SER QL: NEGATIVE
HU2 AB SER QL IF: NEGATIVE
HU3 AB SER QL: NEGATIVE
LACTATE/CREAT UR-SRTO: 52 (ref 0–50)
METHYLMALONATE/CREAT UR-SRTO: NOT DETECTED (ref 0–5)
ORGANIC ACIDS PATTERN UR-IMP: ABNORMAL
PARANEOPLASTIC AB SER-IMP: NORMAL
PCA-1 AB SER QL IF: NEGATIVE
PCA-2 AB SER QL IF: NEGATIVE
PCA-TR AB SER QL IF: NEGATIVE
PYRUVATE/CREAT UR-SRTO: 14 (ref 0–15)
SEBACATE/CREAT UR-SRTO: NOT DETECTED (ref 0–3)
SUBERATE/CREAT UR-SRTO: 1 (ref 0–3)
SUCCINATE/CREAT UR-SRTO: 10 (ref 0–20)
SUCCINYLACETONE/CREAT UR-SRTO: NOT DETECTED (ref 0–0)
VGCC-P/Q BIND IGG+IGM SER IA-SCNC: 0 NMOL/L
VGKC IGG+IGM SER IA-SCNC: 0 NMOL/L

## 2024-03-03 PROCEDURE — 95806 SLEEP STUDY UNATT&RESP EFFT: CPT | Performed by: STUDENT IN AN ORGANIZED HEALTH CARE EDUCATION/TRAINING PROGRAM

## 2024-03-04 LAB
PBG 24H UR-MCNC: 0 MG/L (ref 0–2)
PBG 24H UR-MRATE: 0 MG/24 HR (ref 0–1.5)

## 2024-03-05 LAB
COLLECT DURATION TIME SPEC: 24 HR
CREAT 24H UR-MRATE: 1512 MG/D (ref 700–1600)
CREAT UR-MCNC: 55 MG/DL
CRYOGLOB SER QL: NORMAL
METANEPH 24H UR-MCNC: 40 UG/L
METANEPH 24H UR-MRATE: 110 UG/D (ref 36–229)
METANEPH+NORMETANEPH UR-IMP: NORMAL
METANEPH/CREAT 24H UR: 73 UG/G CRT (ref 0–300)
NORMETANEPHRINE 24H UR-MCNC: 107 UG/L
NORMETANEPHRINE 24H UR-MRATE: 294 UG/D (ref 95–650)
NORMETANEPHRINE/CREAT 24H UR: 195 UG/G CRT (ref 0–400)
SCAN RESULT: NORMAL
SPECIMEN VOL ?TM UR: 2750 ML

## 2024-03-06 ENCOUNTER — OFFICE VISIT (OUTPATIENT)
Dept: CARDIOLOGY | Facility: CLINIC | Age: 39
End: 2024-03-06
Payer: COMMERCIAL

## 2024-03-06 VITALS
HEART RATE: 67 BPM | RESPIRATION RATE: 16 BRPM | SYSTOLIC BLOOD PRESSURE: 113 MMHG | TEMPERATURE: 97.7 F | DIASTOLIC BLOOD PRESSURE: 79 MMHG | WEIGHT: 250 LBS | BODY MASS INDEX: 37.03 KG/M2 | OXYGEN SATURATION: 97 % | HEIGHT: 69 IN

## 2024-03-06 DIAGNOSIS — I10 ESSENTIAL HYPERTENSION: ICD-10-CM

## 2024-03-06 DIAGNOSIS — U09.9 POST-ACUTE SEQUELAE OF COVID-19 (PASC): ICD-10-CM

## 2024-03-06 DIAGNOSIS — R00.2 PALPITATIONS: ICD-10-CM

## 2024-03-06 DIAGNOSIS — R06.09 DYSPNEA ON EXERTION: Primary | ICD-10-CM

## 2024-03-06 PROCEDURE — 3074F SYST BP LT 130 MM HG: CPT | Performed by: INTERNAL MEDICINE

## 2024-03-06 PROCEDURE — 1036F TOBACCO NON-USER: CPT | Performed by: INTERNAL MEDICINE

## 2024-03-06 PROCEDURE — 99214 OFFICE O/P EST MOD 30 MIN: CPT | Mod: 25 | Performed by: INTERNAL MEDICINE

## 2024-03-06 PROCEDURE — 93005 ELECTROCARDIOGRAM TRACING: CPT | Performed by: INTERNAL MEDICINE

## 2024-03-06 PROCEDURE — 3078F DIAST BP <80 MM HG: CPT | Performed by: INTERNAL MEDICINE

## 2024-03-06 PROCEDURE — 99214 OFFICE O/P EST MOD 30 MIN: CPT | Performed by: INTERNAL MEDICINE

## 2024-03-06 PROCEDURE — 93010 ELECTROCARDIOGRAM REPORT: CPT | Performed by: INTERNAL MEDICINE

## 2024-03-06 RX ORDER — PROPRANOLOL HYDROCHLORIDE 10 MG/1
10 TABLET ORAL 3 TIMES DAILY
Qty: 90 TABLET | Refills: 11 | Status: SHIPPED | OUTPATIENT
Start: 2024-03-06 | End: 2024-03-06 | Stop reason: SDUPTHER

## 2024-03-06 RX ORDER — PROPRANOLOL HYDROCHLORIDE 10 MG/1
10 TABLET ORAL 3 TIMES DAILY
Qty: 90 TABLET | Refills: 11 | Status: SHIPPED | OUTPATIENT
Start: 2024-03-06 | End: 2024-03-18

## 2024-03-06 NOTE — PROGRESS NOTES
"Alex Morton \"Rachana\" is a 38 y.o. female who presents to the [facility] for evaluation of chronic dyspnea on exertion.    Ms. Morton had COVID x 4 since 2020. Only after September 2023 viral infection did she start to have exertional dyspnea, palpitations, POTS like positional lightheadedness and tachycardia. Taking propranolol for palpitation suppression with good relief for first 8-10 hours  per dose. October 2023 24 hour ambulatory BP average 139/87 mm Hg (not on propranolol at that time, was taking lisinopril 20 mg).    She has no active cardiac symptoms of PND, orthopnea, EDIL, syncope, or claudication.     Past Medical History:  1. Palpitations  2. POTS / PASC: POTS by 2024 Tilt table testing  3. Borderline hypertension (1    Social History:  Nonsmoker    Family History:  No premature CAD in 1st degree relatives ( 55 years of age for male relatives,  65 years of age for female relatives)    Review of Systems    A 14 point review of systems was asked. All questions were negative except for pertinent positives listed in the HPI.      Current Outpatient Medications on File Prior to Visit   Medication Sig Dispense Refill    azelastine (Astelin) 137 mcg (0.1 %) nasal spray Administer 1 spray into each nostril 2 times a day. Use in each nostril as directed      cetirizine (ZyrTEC) 10 mg chewable tablet Chew once daily.      cholecalciferol (Vitamin D3) 1,250 mcg (50,000 unit) tablet Take 1 tablet (50,000 Units) by mouth every 7 days. 12 tablet 0    cyclobenzaprine (Flexeril) 10 mg tablet Take 1 tablet (10 mg) by mouth 3 times a day as needed for muscle spasms.      escitalopram (Lexapro) 10 mg tablet Take 1 tablet (10 mg) by mouth once daily. 30 tablet 2    fluticasone (Flonase) 50 mcg/actuation nasal spray Administer 1 spray into each nostril once daily. Shake gently. Before first use, prime pump. After use, clean tip and replace cap. 16 g 5    ibuprofen 800 mg tablet " "Take 1 tablet (800 mg) by mouth every 6 hours if needed for headaches, mild pain (1 - 3) or moderate pain (4 - 6).      levothyroxine (Synthroid, Levoxyl) 50 mcg tablet Take 1 tablet (50 mcg) by mouth once daily.      lisinopril 20 mg tablet Take 1 tablet (20 mg) by mouth once daily.      wheat dextrin (Benefiber Healthy Shape) 5 gram/7.4 gram powder Take 1 teaspoon daily 248 g 1    [DISCONTINUED] propranolol (Inderal) 10 mg tablet Take 1 tablet (10 mg) by mouth 2 times a day. 60 tablet 2     No current facility-administered medications on file prior to visit.     Objective   Physical Exam  BP Readings from Last 3 Encounters:   03/06/24 113/79   02/05/24 120/86   01/24/24 121/81      Wt Readings from Last 3 Encounters:   03/06/24 113 kg (250 lb)   02/05/24 112 kg (247 lb 11.2 oz)   01/24/24 114 kg (250 lb 12.8 oz)      BMI: Estimated body mass index is 36.92 kg/m² as calculated from the following:    Height as of this encounter: 1.753 m (5' 9\").    Weight as of this encounter: 113 kg (250 lb).  BSA: Estimated body surface area is 2.35 meters squared as calculated from the following:    Height as of this encounter: 1.753 m (5' 9\").    Weight as of this encounter: 113 kg (250 lb).    General: no acute distress  HEENT: EOMI, no scleral icterus.  Lungs: Clear to auscultation bilaterally without wheezing, rales, or rhonchi.  Cardiovascular: Regular rhythm and rate. Normal S1 and S2. No murmurs, rubs, or gallops are appreciated. JVP normal.  Abdomen: Soft, nontender, nondistended. Bowel sounds present.  Extremities: Warm and well perfused with equal 2+ pulses bilaterally.  No edema present.  Neurologic: Alert and oriented x3.    I have personally reviewed the following images and laboratory findings:  Last echocardiogram:  11/15/2023 echo: LV EF 50%, no LVH (LVMI 52 gm/m2), normal diastology (E/e' 6), normal LA size (CARY 15 ml/m2), normal RV/RA, no AI, no MR, mild TR, RVSP 33 mm Hg (RAP 3 mm Hg).    Last cath / stress " test / CACS:  2/20/2024 CT chest : No coronary artery calcification    Most recent ECG:  3/6/2024 ECG: Sinus rhythm, 68 bpm, normal ECG. Personally reviewed in office.    11/17/2023 PFT:   FEV1 3.51 L (100% pred), FVC 4.37 L (103% pred), FEV1/FVC ratio 80%, no BD response,  TLJ 6.07 L (104% pred), RV/TLC ratio 32% (107% pred), DLCO/VA 5.32 (92% pred). Normal spirometry     Assessment/Plan   1. Chronic dyspnea:  Post-COVID exercise intolerance / chest pain / tachycardia / dyspnea on exertion symptom complex.    I plan to order a Cardiopulmonary exercise test (CPET)  to quantify current exercise capacity, re-create the sensation of exertional dyspnea that the patient is experiencing in a monitored environment, and attempt to localize exercise intolerance pattern to cardiac, pulmonary or peripheral (skeletal muscle) limitation.     We will check before and after exercise spirometry to assess for exercise induced reactive airway disease.     Follow up after exercise testing.               SIGNATURE: Fabian Kam MD PATIENT NAME: Rajani Morton   DATE/TIME: March 6, 2024 8:28 AM MRN: [id number]

## 2024-03-06 NOTE — PATIENT INSTRUCTIONS
You were seen at the [facility] for a check up of your heart.    I am ordering a cardiopulmonary exercise test (CPET) for you to get at [facility], [facility] [address].     This exercise test will allow us to quantify your current exercise capacity, re-create your feeling of shortness of breath in a monitored environment, and help localize an exercise intolerance pattern to your heart, lungs, or skeletal muscles. We will have you do limited before and after exercise spirometry breathing through the metabolic cart mask.     For management of POTS, I recommend you increase propranolol 10 mg tablets to every 8 hours from every 12 as your symptoms return after 8-10 hours after each dose. Continue your fluid and electrolyte repletion strategy as you are doing.    Follow up with Dr. Kam after exercise testing is completed.

## 2024-03-10 LAB
ATRIAL RATE: 68 BPM
P AXIS: 32 DEGREES
P OFFSET: 193 MS
P ONSET: 140 MS
PR INTERVAL: 164 MS
Q ONSET: 222 MS
QRS COUNT: 11 BEATS
QRS DURATION: 86 MS
QT INTERVAL: 392 MS
QTC CALCULATION(BAZETT): 416 MS
QTC FREDERICIA: 408 MS
R AXIS: 27 DEGREES
T AXIS: 32 DEGREES
T OFFSET: 418 MS
VENTRICULAR RATE: 68 BPM

## 2024-03-12 ENCOUNTER — APPOINTMENT (OUTPATIENT)
Dept: OTOLARYNGOLOGY | Facility: CLINIC | Age: 39
End: 2024-03-12
Payer: COMMERCIAL

## 2024-03-14 ENCOUNTER — APPOINTMENT (OUTPATIENT)
Dept: GASTROENTEROLOGY | Facility: CLINIC | Age: 39
End: 2024-03-14
Payer: COMMERCIAL

## 2024-03-19 ASSESSMENT — LIFESTYLE VARIABLES: DO_YOU_DRINK?: I DO THIS LESS OFTEN

## 2024-03-20 ENCOUNTER — APPOINTMENT (OUTPATIENT)
Dept: NEUROLOGY | Facility: HOSPITAL | Age: 39
End: 2024-03-20
Payer: COMMERCIAL

## 2024-03-21 ENCOUNTER — OFFICE VISIT (OUTPATIENT)
Dept: OTOLARYNGOLOGY | Facility: CLINIC | Age: 39
End: 2024-03-21
Payer: COMMERCIAL

## 2024-03-21 VITALS
SYSTOLIC BLOOD PRESSURE: 121 MMHG | WEIGHT: 251 LBS | TEMPERATURE: 97.8 F | DIASTOLIC BLOOD PRESSURE: 79 MMHG | BODY MASS INDEX: 37.07 KG/M2

## 2024-03-21 DIAGNOSIS — R05.3 CHRONIC COUGH: ICD-10-CM

## 2024-03-21 DIAGNOSIS — U09.9 POST-ACUTE SEQUELAE OF COVID-19 (PASC): ICD-10-CM

## 2024-03-21 DIAGNOSIS — K21.9 LARYNGOPHARYNGEAL REFLUX (LPR): ICD-10-CM

## 2024-03-21 DIAGNOSIS — R09.82 POSTNASAL DRIP: ICD-10-CM

## 2024-03-21 DIAGNOSIS — R51.9 NONINTRACTABLE HEADACHE, UNSPECIFIED CHRONICITY PATTERN, UNSPECIFIED HEADACHE TYPE: ICD-10-CM

## 2024-03-21 DIAGNOSIS — J01.81 OTHER ACUTE RECURRENT SINUSITIS: Primary | ICD-10-CM

## 2024-03-21 DIAGNOSIS — R43.9 DYSOSMIA: ICD-10-CM

## 2024-03-21 DIAGNOSIS — R44.8 SENSATION OF PRESSURE IN FACE: ICD-10-CM

## 2024-03-21 PROCEDURE — 3078F DIAST BP <80 MM HG: CPT

## 2024-03-21 PROCEDURE — 3074F SYST BP LT 130 MM HG: CPT

## 2024-03-21 PROCEDURE — 1036F TOBACCO NON-USER: CPT

## 2024-03-21 PROCEDURE — 99204 OFFICE O/P NEW MOD 45 MIN: CPT

## 2024-03-21 PROCEDURE — 31231 NASAL ENDOSCOPY DX: CPT

## 2024-03-21 RX ORDER — DOXYCYCLINE 100 MG/1
100 CAPSULE ORAL 2 TIMES DAILY
Qty: 28 CAPSULE | Refills: 0 | Status: SHIPPED | OUTPATIENT
Start: 2024-03-21 | End: 2024-04-04

## 2024-03-21 RX ORDER — OMEPRAZOLE 40 MG/1
40 CAPSULE, DELAYED RELEASE ORAL
Qty: 90 CAPSULE | Refills: 3 | Status: SHIPPED | OUTPATIENT
Start: 2024-03-21 | End: 2024-05-31

## 2024-03-21 NOTE — PATIENT INSTRUCTIONS
NASAL STEROID SPRAY INSTRUCTIONS: Please use the recommended topical nasal spray as directed. BE SURE TO POINT THE SPRAY TOWARDS THE CORNER OF THE EYE ON THE SAME SIDE NOSTRIL. This will ensure you are treating the appropriate parts of your nose that are swollen or inflamed. This medication will take upwards of one month before you notice full benefit. You MUST use it every day for it to be effective. This medication may be purchase over-the-counter or prescription may be submitted upon request.      ANTIBIOTIC INSTRUCTIONS: You will be prescribed an oral antibiotic called Doxycycline. Please take the medication as prescribed, the instructions may be found on the medication bottle.   Special instructions: Please take food with oral antibiotic (doxycycline) use. Do not take medication within 2 hours of consuming dairy products, as this may interact with the medication’s effectiveness. You may take probiotics while on antibiotics: you may use yogurt as probiotic if consumed 2 hours before or after taking doxycycline.  Adverse drug reactions: Doxycycline can cause a skin sensitivity reaction with the sun. Avoid sun exposure while taking this medication. Severe sunburn and skin damage may occur with sun exposure if no UV protection is used while on this medication. Discontinue antibiotic use if you experience abdominal pain, bloating and severe diarrhea. Call 911 or proceed to nearest Emergency Department if you experience hives and/or shortness of breath occur while taking doxycycline. Call the office and new medication will be prescribed.  Other important information: It is essential for you to complete the FULL COURSE of antibiotics for maximum effectiveness and decreasing risk of pathogen resistance. Even if you start to feel symptom improvement, please continue to take the antibiotic course entirely unless you are experiencing any adverse drug reactions.   Follow up with radiologic test such as CT or MRI if these  were ordered at today's visit, please call to schedule imaging with radiology services if you have not already done so.

## 2024-03-21 NOTE — PROGRESS NOTES
"Patient ID: Rajani Morton \"Jared" is a 38 y.o. female who presents for the evaluation of  persistent facial pressure/pain, postnasal drip, and chronic nonproductive cough. They present as a referral from [facility] provider Roxanne Hummel CNP.    PROVIDER IMPRESSIONS:  DIAGNOSES/PROBLEMS:  -Laryngopharyngeal reflux (LPR)  -Other recurrent acute sinusitis  -Postnasal drip  -Chronic cough  -Headache  -Sensation of pressure/pain in face  -Dysosmia  -Post-acute sequelae of COVID-19 (PASC)    ASSESSMENT:   Rajani Morton is a pleasant 38 y.o. female with a history of post-acute sequelae of COVID-19 (PASC) referred by provider from [facility] for persistent facial pressure/pain, postnasal drip, and chronic nonproductive cough. Associated symptoms include headache and dysosmia secondary to PASC. Nasal endoscopy was performed today and findings detailed in the procedure note below, which revealed mucosa erythematous and swollen with bilateral nasal turbinate hypertrophy. The septum was deviated right. Endoscopy with brief view of larynx that showed hyperemic/edematous mucosa, pyriform sinus mucus accumulation, BOT lingual tonsil hypertrophy. I discussed the findings of the patient and offered reassurance and counseling. No overt evidence of TVC lesions/weakness and no sign of purulent nasal drainage, nasal polyps, nasal lesions/masses, or nasal bleeding. I discussed the findings of the patient and offered reassurance and counseling. Based on the clinical information provided, symptoms and clinical exam findings are most consistent with LPR vs. Recurrent/chronic rhinosinusitis.  I explained to patient that management of LPR with PPI therapy trial for reflux lifestyle modifications may reduce nasopharyngeal inflammation due to underlying heartburn/reflux etiology and aid in healing mucosal lining. I explained that given the patient's history of recurrent acute sinusitis over the past 12 " months (treated 6-7 times), providing maximum medical therapy with empiric antibiotic therapy for sinusitis and subsequent CT sinus imaging to evaluate for sinonasal pathology is the most reasonable approach to determine further recommendations.    PLAN  I recommended continuing nasal steroid spray  fluticasone (Flonase). I recommended either 2 puffs into each nostril daily, or 1 puff into each nostril twice daily for a consistent trial of at least 4-6 weeks. Patient counseled that this medication is a topical intranasal steroid nasal spray indicated to reduce nasal inflammation. Patient was educated on proper administration of nasal spray, by tilting their head down and pointing the applicator tip towards the lateral wall of the nose/corner of the eye on the same side. I advised patient to avoid pointing applicator toward the septum due to the risk of nasal bleeding.  Patient informed to discontinue the spray if they experience adverse side effects. This medication may be purchased over the counter; a prescription may be sent to the patient's pharmacy upon request.  I recommend continuing use of Azelastine nasal spray: 2 sprays each nostril once a day for better topical allergy control. This medication may be purchased over the counter; a prescription may be sent to the patient's pharmacy upon request.  I recommend the patient restarts daily, consistent intranasal saline irrigations for nasal symptoms. I educated the patient that saline irrigations aid in flushing out residual mucus, crusts, allergens or other environmental irritants in the nasal cavity. I emphasized that this will help clean the nasal cavity PRIOR to use of the medicated intranasal spray. I counseled the patient on appropriate administration of saline irrigations. I informed the patient that saline irrigation kits may be purchased over the counter.   I recommended an oral antibiotic course of p.o. Doxycycline 100 mg twice daily (every 12 hours)  for 14 days. The patient was advised to take sun precautions while on medication. I informed the patient to notify us if they experience abdominal bloating and diarrhea as we may likely change the prescribed medication. I electronically sent the prescription order to the patient's preferred pharmacy on file.   I recommended CT sinus volumetric without contrast for further pathological-anatomical evaluation of the paranasal sinuses. I counseled patient that imaging should be performed shortly after completing a course of oral antibiotics. I also educated patient to continue with recommended maximum medical therapy with topical sprays, irrigations, etc. Patient was provided the requisition order and instructed to contact radiology services to schedule imaging. Patient reassured that I will personally review CT imaging, pending results. Reassurance provided to patient that findings will be discussed at follow-up visit with my office and/or with the referred rhinology physician determine further recommendation.   I counseled the patient on dietary and lifestyle modifications to improve LPR. I recommended limiting or avoiding consumption of spicy, fatty, caffeinated, carbonated, acidic, and citrus foods or beverages that may aggravate reflux. I also recommended limiting or avoiding consumption of chocolate, peppermint, and alcohol.  I recommended eating smaller, more frequent meals and to avoid eating or drinking 2-3 hours before lying down. The patient was also apprised that elevating the head of the bed may help. The patient was counseled on avoiding NSAIDS as much as possible, and if taking them be sure to do so with a meal.  I recommended starting trial of PPI therapy for LPR. Omeprazole 40mg was prescribed once daily to be taken 30 minutes before meals. I discussed with the patient that this medication can limit gastric acid and subsequently allow for adequate mucosal healing. Patient was informed that it may take  up to 3-6 months to notice any therapeutic effect.  I recommended referral to neurology for evaluation and management of possible migraines. Referral e-submitted and patient instructed to call and schedule.   Follow-up: Patient may schedule for virtual follow-up in 8-12 weeks to review CT sinus results and evaluate treatment outcomes. They may follow-up sooner, if needed. Patient is agreeable to this plan, all questions were answered to patient's satisfaction.     Subjective   HPI: Rajani Morton is a 38 y.o. female who presents for the evaluation of persistent facial pain/pressure, cough, and postnasal drip. Reports history of COVID-19 infection x4 from 2943-4408 and most recently tested positive 6 months ago in September 2023. States that symptoms have remained ongoing since last COVID-19 infection and associated with recurrent sinus infections. In the last 12 months, the patient estimates the number of medically treated sinus infections to be 6-7 infections. The most recent sinus infection was a few months ago. Reports that facial tenderness/pressure is located more toward the right cheek/forehead and associated with headache-like pressure. Reports that cough is non-producive/dry but is incited by sensation of posterior nasal drainage. Mentions she has experienced smell distortion which she attributes to COVID-19 infections, states she continuously notices phantom smells of cigarette smoke as well as taste distortion which remains unchanged. Denies anterior nasal drainage, nasal airway obstruction, sneezing, itching eyes, hoarseness and nasal bleeding. Reports recent increase in heartburn/reflux since last COVID-19 infection and has been using o.t.c. omeprazole 20 mg daily for symptoms. When asked about medications used for sinonasal symptoms, the patient reports current use of flonase with consistent use for the past 3 months. Reports she has used Azelastine nasal spray consistently for the past 1.5 years  with noticeably decreased symptom benefit since last COVID-19 infection in September 2023. The patient reports prior use of netti-pot saline irrigations. When asked about a past medical history of asthma, aspirin sensitivity, migraines, allergies, nasal fracture/trauma, the patient admits to seasonal allergies. States she may experience migraines but has not been confirmed/followed by neurology. The patient denies prior allergy/immunology testing. Patient denies prior sinonasal surgery and denies sinonasal imaging within the past 5 years. Other past medical history includes h/o PASC, hypothyroidism, endometriosis, fibromyalgia, POTS, HTN, migraines, obesity, and IBS.     PATIENT HISTORY:  Past Medical History:   Diagnosis Date    Allergic     Avascular necrosis of bone (CMS/HCC) 2019    both hips, from steroids    Disease of thyroid gland     Fibromyalgia     Hypertension     POTS (postural orthostatic tachycardia syndrome)       Past Surgical History:   Procedure Laterality Date    HYSTERECTOMY      1 ovary left    LABYRINTHECTOMY      OTHER SURGICAL HISTORY  01/07/2020    Laparoscopic hysterectomy    RENAL BIOPSY        Allergies   Allergen Reactions    Morphine Nausea/vomiting        Current Outpatient Medications:     azelastine (Astelin) 137 mcg (0.1 %) nasal spray, Administer 1 spray into each nostril 2 times a day. Use in each nostril as directed, Disp: , Rfl:     cetirizine (ZyrTEC) 10 mg chewable tablet, Chew once daily., Disp: , Rfl:     cholecalciferol (Vitamin D3) 1,250 mcg (50,000 unit) tablet, Take 1 tablet (50,000 Units) by mouth every 7 days., Disp: 12 tablet, Rfl: 0    cyclobenzaprine (Flexeril) 10 mg tablet, Take 1 tablet (10 mg) by mouth 3 times a day as needed for muscle spasms., Disp: , Rfl:     escitalopram (Lexapro) 10 mg tablet, TAKE 1 TABLET BY MOUTH EVERY DAY, Disp: 90 tablet, Rfl: 0    fluticasone (Flonase) 50 mcg/actuation nasal spray, Administer 1 spray into each nostril once daily.  Shake gently. Before first use, prime pump. After use, clean tip and replace cap., Disp: 16 g, Rfl: 5    ibuprofen 800 mg tablet, Take 1 tablet (800 mg) by mouth every 6 hours if needed for headaches, mild pain (1 - 3) or moderate pain (4 - 6)., Disp: , Rfl:     levothyroxine (Synthroid, Levoxyl) 50 mcg tablet, Take 1 tablet (50 mcg) by mouth once daily., Disp: , Rfl:     lisinopril 20 mg tablet, Take 1 tablet (20 mg) by mouth once daily., Disp: , Rfl:     propranolol (Inderal) 10 mg tablet, TAKE 1 TABLET BY MOUTH TWICE A DAY, Disp: 180 tablet, Rfl: 0    wheat dextrin (Benefiber Healthy Shape) 5 gram/7.4 gram powder, Take 1 teaspoon daily, Disp: 248 g, Rfl: 1   Tobacco Use: Medium Risk (3/6/2024)    Patient History     Smoking Tobacco Use: Former     Smokeless Tobacco Use: Never     Passive Exposure: Not on file      Alcohol Use: Not At Risk (2/10/2024)    AUDIT-C     Frequency of Alcohol Consumption: Monthly or less     Average Number of Drinks: Patient does not drink     Frequency of Binge Drinking: Never      Social History     Substance and Sexual Activity   Drug Use Yes    Types: Marijuana        Review of Systems   All other systems negative.     Objective   Visit Vitals  Smoking Status Former        PHYSICAL EXAM:  General appearance: Appears well, well-nourished, well groomed. No acute distress.   Constitutional: No fever, chills, weight loss or weight gain.  Communication: Normal communication  Psychiatric: Oriented to person, place and time. Normal mood and affect.  Neurologic: Cranial nerves II-XII grossly intact and symmetric bilaterally.  Cardiovascular: Examination of peripheral vascular system shows no clubbing or cyanosis.  Respiratory: No respiratory distress increased work of breathing. Inspection of the chest with symmetric chest expansion and normal respiratory effort.  Skin: No head and neck rashes.  Head: Normocephalic. Atraumatic with no masses, lesions or scarring.  Face: Normal symmetry. No  scars or deformities.  Eyes: Conjunctiva not edematous or erythematous. PERRLA  Neck: Supple and symmetric, trachea midline. Lymph nodes with no adenopathy.  Head: Normocephalic. Atraumatic with no masses, lesions or scarring.  Eyes: PERRL, EOMI, Conjunctiva is clear. No nystagmus.  Nose: External inspection of nose: No nasal lesions, lacerations or scars. Mild tenderness on frontal or maxillary sinus palpation.  Throat:  Floor of mouth is clear, no masses.  Tongue appears normal, no lesions or masses. Gums, gingiva, buccal mucosa appear pink and moist, no lesions. Teeth are in intact.  No obvious dental infections.  Peritonsillar regions appear symmetric without swelling.  Hard and soft palate appear normal, no obvious cleft. Uvula is midline.  Left Tonsil -- 2+, no exudates, no erythema.   Right Tonsil -- 2+, no exudates, no erythema.   Oropharynx: No lesions. Retropharyngeal wall is flat.  No postnasal drip.  Salivary Glands: Symmetric bilaterally.  No palpable masses.  No evidence of acute infection or salivary stones.  TMJ: Normal, no trismus.  Right Ear: External inspection of ear with no deformity, scars, or masses. Mastoid is nontender. External auditory canal is clear. TM is intact with no sign of infection, effusion, or retraction. No perforation seen.   Left Ear: External inspection of ear with no deformity, scars, or masses. Mastoid is nontender. External auditory canal clear.  TM is intact with no sign of infection, effusion, or retraction. No perforation seen.     RECENT LAB RESULTS:  No results found for this or any previous visit (from the past 24 hour(s)).    PROCEDURE NOTE: NASAL ENDOSCOPY-DIAGNOSTIC  Indication: concern for sinusitis  Procedure Note: For better visualization, flexible nasal endoscopy examination was performed.  Verbal consent was obtained by the patient and/or guardian. The risks, benefits, alternatives, and expectations were discussed with the patient, who wished to proceed. Both  nostrils were sprayed with a mixture of lidocaine 4% and topical nasal decongestant oxymetazoline. After a sufficient amount of time elapsed for mucosal anesthesia to take place, the flexible fiberoptic nasopharyngoscope was advanced into the right naris, and then left naris. The following areas were visualized: Nasal passage, nasal septum, turbinates, middle meatus, nasopharynx, sinus ostia. At the end of the procedure, the nasopharyngeal scope removed without difficulty.   Outcomes: There were no complications and the patient tolerated the procedure well.    Procedure Findings:  -The nasal septum was deviated right. No evidence of septal perforation or hematoma.  - Right: The inferior turbinate was hypertrophic. The middle turbinate appeared hypertrophic. The middle meatus and spheno-ethmoid recess were free of purulence, masses, lesions, or polyps. The nasal passageway is patent. The nasopharynx was clear. The nasal mucosal lining was moist, no evidence of bleeding, lesions, or ulceration.   - Left: The inferior turbinate was hypertrophic. The middle turbinate appeared hypertrophic. The middle meatus and spheno-ethmoid recess were free of purulence, masses, or polyps. The nasal passageway is patent. The nasopharynx was clear. The nasal mucosal lining was moist, no evidence of bleeding, lesions or ulceration.  -Other findings: brief view of larynx with evidence of hyperemic/edematous mucosa, pyriform sinus mucus accumulation, BOT lingual tonsil hypertrophy. No overt evidence of TVC lesions/weakness/granulomas.    Patricia Salgado, APRN-CNP

## 2024-03-28 ENCOUNTER — APPOINTMENT (OUTPATIENT)
Dept: CARDIAC REHAB | Facility: HOSPITAL | Age: 39
End: 2024-03-28
Payer: COMMERCIAL

## 2024-04-04 ENCOUNTER — APPOINTMENT (OUTPATIENT)
Dept: RADIOLOGY | Facility: HOSPITAL | Age: 39
End: 2024-04-04
Payer: COMMERCIAL

## 2024-04-04 ENCOUNTER — APPOINTMENT (OUTPATIENT)
Dept: GASTROENTEROLOGY | Facility: CLINIC | Age: 39
End: 2024-04-04
Payer: COMMERCIAL

## 2024-04-09 NOTE — PROGRESS NOTES
Patient canceled her visit in the autonomic clinic with Dr. Vanessa Davis due to illness, below note is incomplete:    Subjective   COVID-19 Infection Date:  12/14/2020 confirmed via rapid home antigen test (sx: Fever, Fatigue, Loss or disturbed smell, Headache, Sore throat, Cough, Pain on breathing, Loss or disturbed taste, Muscle pain, Sinus pressure, Shortness of breath, Chest pain, Runny nose, Abdominal pain  - no hospitalization, treated with Augmentin and prednisone)  December 2021 confimed via rapid home antigen test (sx: Fever, Cough, Runny nose, Sore throat, Sinus Pressure - no hospitalization, treated with Augmentin and Prednisone for sinusitis)  Spring 2022 confirmed via rapid home antigen test (sx: Fever, Cough, Runny nose, Sore throat, Sinus Pressure  - no hospitalization, treated with Augmentin and prednisone for sinusitis)  9/20/2023 confirmed via rapid home antigen test (sx: Fever, Cough, Shortness of breath, Fatigue, Pain on breathing, Chest pain, Loss or disturbed smell, Loss or disturbed taste, Runny nose, Headache, Muscle pain, Abdominal pain, Diarrhea, Brain fog, Sore throat, Sinus pressure  - no hospitalization, treated with Prednisone for sinusitis)    COVID-19 vaccine status: Pfizer-PageFair 11/20/21, 12/11/21, 5/17/22     Occupation: full-time  for [employer] working remotely    Current Providers: PCP Faustina Soria, Cardiology Dr. Aleman, GI CNP Karis, ENT CNP Naomi    Survey Scores: 01/2024  PHQ-9: 16    CHAITANYA-7: 6    Sleep Wellness: 9    FSS average: 6.56    Modified Ecog average: 2.17    MOCA: 21/22 (02/2024)  Overall Health: 50     38 y.o. female with a h/o COVID-19 in December 2020, December 2021, Spring 2022, September 2023, hypothyroidism, endometriosis, fibromyalgia, POTS, HTN, migraines, obesity, IBS, presents in the autonomic clinic for further evaluation of post-COVID-19 POTS.      sinus complaints,   GI complaints,   dizziness,   palpitations and  HR irregularities,   pain,   anxiety and depression,   fatigue,   brain fog,   headache,   tinnitus,   smell and taste changes,   shortness of breath,   cough,   chest pain,   rashes/skin flushing,   numbness and tingling,   muscle cramps,   tremors,   muscle weakness.    Had COVID 4 times, each less less severe than the one prior until Septer which started her LC symptoms  Treated with antibiotics and prednisone for sinusitis with each episode  POTS diagnosis in the fall, dysautonomia symptoms have been bad, either ice cold or profusely sweating, HR fluctuating from   Was taking metoprolol prior to COVID, PCP started her on propranolol, cardiologist took her off all except lisinopril, BP has not been stable or consistent since COVID  Did a 24hr BP monitor and that showed that when sitting down and not moving around she had excellent readings, when up and moving then 140//110  POTS symptoms have worsened significantly since she was taken off metoprolol and propranolol  O2 will still drop to 80s when up and moving  Work-up was reassuring except for elevated BP, pulse was irregular  Has not had autonomic testing, cardiologist did orthostatics in the office  Has an appointment coming up with neurology for this  Difficulty tolerating showers, passed out, using shower chair  Often nauseated in the morning, no vomiting  IBS symptoms are a lot worse now, constipation and diarrhea, hx of IBS-C primarily  Following with GI, next appointment this Wednesday  Gallbladder attacks are more problematic  Hx of fibromyalgia, but a lot more leg pain since COVID, joint pain was already really bad there, feels like an isolated area in the muscles, 2-3 inch circles, moving around  Ibuprofen is not helpful  Hx of extensive pain  Normal swelling due to nephrotic syndrome when she was 17  Sometimes wears compression stockings but usually super high compression leggings  Has been trying to add salt but hx of nephrotic symptoms  is not used to any salt  Drinking element package (1g) per day and adding more salt to her food  Prior to COVID drank 120-150oz of water per day, less now because water makes her feel nauseated, around 80oz per day and feels dehydrated  Mentally taxed becsam she has been feeling so miserable, struggling to get through the day  Has chronic health problems and was able to manage these better  Never had anxiety or depression before, now both of these  O2 is dropping when she is sleeping as well  Not sleeping well, exhausted all the time, nodding off when she sits, cannot fall asleep for 2-3 hours, typically stays asleep if nothing wakes her, when she is woken up then has difficulty getting back to sleep, average time asleep is 5-6 hours per day, naps on her day off  Snores, worse when having sinus problems  Not refreshed in the morning  Brain fog, has moments prior due to fibromyalgaia but now this is constant, has to keep notes, harder time comprehending, forgeting what she is doing walking into a room, slower learning than her typical at work  Got approved for ADA accommodations at work, can call off 3 days per month  Hard for her to make it through an 8hr day and 5 days per week  Headaches more often, at 2-3 times per week, ibuprofen or excedrine helps 40% of the time  75% on forehead, the rest at the base of her head, descrbes pain as stabbing and intense in the back of her head, dull and constant in her forehead  No vision changes, no hearing changes but has been having tinnitus which is new, may just be the left ear, intermittent, a little bit of ear discomfort, since COVID excessive amount of wax production  Smells sulphur and cigarette smoke often, taste has been messed up since first time she has had COVID, changes often, something that she lives today can taste bad tomorrow, peanut butter or eggs   No mouth sores, a bit of dry mouth, sensitive eyes but no dry eyes  Face pain from sinus problems  Sinus  problems, coughing, post-nasal drainage  Started Azelastine 1.5yrs ago, that has helped until the last COVID illness  Getting SOB, air hunger, was working out and losing weight before COVID  Now when going upstairs feels SOB, also when moving a lot such as getting ready for the day and she stops then gets hit with shortness of breath the most intense  Cough, worse when her O2 is lower or when really hot, dry cough  A lot of chest pain the past two week, new  High blood pressure a couple of times this weekend, center in her chest, comes and goes, worse when she eats but in different place than the gallbladder pain, sometimes wakes up with the pain, almost always worse when she eats, describes pain as sharp and stabbing  No acid reflux, took omeprazole for a while and it didn't help anything, stopped around a month ago after taking it straight for 2 months, next FUV with GI is on Wednesday  No sores  Getting butterfly rash often, gets redness on face or neck, unilateral, happens when she doesn't feel well  Butterfly rash has been happening intermittently for years  No abnormal bleeding or bruising, hx of bruising easily  No hypermobility  Numbness and tingling on left arm and hand intermittently, sometimes in right hand as well  Quickly getting numbness and tingling in legs when in a position for too long  Get charley horses in her whole body all the time, abdomen, back, side of back  Tremors hands are not holding things still, which is new  General muscle weakness with is new, opening a jar hurts her hand, often cannot do that    Relevant prior healthcare visits:  -03/2024 ENT for LPR, recurrent acute sinusitis, post-nasal drainage, chronic cough, headache and facial pain, dysosmia in light of PASC. Recommends to continue Flonase and Azelastine, restart saline irrigations, prescribed doxycycline, ordered CT sinus, conservative measures for GERD and PPI trial, referral to neurology for ? Migraines  -03/2024 Cardiology  Dr. Kam ordered cPET for PASC-related exercise intolerance, chest pain, tachycardia, [place]O  -02/2024 PCP prescribed Flonase and medrol dose pack for sinus complaint  -01/2024 Cardiology notes HTN is not fully controlled but given fatigue and hypotensive type symptoms recommends tracking this for now, dyspnea is of unclear etiology with unremarkable testing, recommends exercise, for POTS/fibromyalgia/LC recommends trying nontraditional therapies  -12/2023 Cardiology notes normal echocardiogram, PFTs reviewed with pulmonary who does not feel that abnormalities notes are significant, if dyspnea worsens then would consider neuromuscular work-up, recommends weaning off metoprolol to see if symptoms improved without  -12/2023 ED for syncope in shower and foot injury  -10/2023 GI ordered fibroscan for fatty liver, fiber supplement and miralax for IBS-C, omeprazole and gallbladder US for RUQ abdominal pain, referred to cardiology for HTN and tachycardia  -11/2022 Sleep Medicine [facility] EDS, recommends follow-up with rheumatology as scheduled, PSG and MSLT    Relevant prior diagnostic studies:  -04/2024 HSAT shows very mild ED with O2 garret 83.1  -02/2024 autonomic testing consistent with POTS  -02/2024 CTA chest shows no PE and clear lungs  -12/2023 CXR with clear lungs  -11/2023 PFTs normal  -11/2023 FibroSCAN shows stage 0-1 disease and severe steatosis  -11/2023 echocardiogram with LVEF 50%, borderline pulmonary HTN with RVSP 33-35 mmHg, endocardial walls not well-visualized leading to inability to accurately calculation of EF with possible mild hypokinesis in the anterior lateral wall but this is very poorly visualized  -10/2023 US gallbladder unremarkable, prominent liver  -10/2023 CT abdomen and pelvis unremarkable   -03/2023 colonoscopy normal    Relevant prior laboratory values, unremarkable unless noted:  -02/2024 GGT, cryoglobulin, urine porphobilinogen, urine metanephrines, urine organic acids with elevation in  lactic acid, urine copper, UPEP, aldolase, catecholamines, carnitine, TPO ab, Dys-2, Vitamin E, Anti-thyroglobulin ab, T3, T4, anti-parietal cell ab, paraneoplastic ab, mycoplasma pn IgM negative and IgG positive, MMA, homocysteine, serum copper, CoQ10, Mag, Vitamin D 13, Vitamin B12 302, CMP, CK, BNP, CCP, iron studies, TSH, ESR 34, RF, D-Dimer 840, CBC/D, HbA1c, Folate, Ferritin, CRP 1.28, JEET, am cortisol, Vitamin B6, Vitamin B1, Troponin, SPEP, Tryptase  -12/2023 Troponin, CMP, CBC/D  -10/2023 iron studies, Hepatitis panel, Anti-smooth muscle ab, anti-mitochondrial ab, [id number], Mag, Lipase, D-Dimer  705, Vitamin D, TSH  -10/2022 ferritin, T4, vitamin B12 377, JEET, CCP ab, sjogren ab, TPO ab, thyroglubin ab  -05/2022 HbA1c 4.8%, RF, CRP 1.3, ESR, uric acid    Exercise routine: none  Diet:  Weight hx: pre-COVID-19 230  lbs -> post-COVID-19 255  lbs  Substance use: former tobacco use, 1-2 servings ETOH monthly or less   Social:       Current Outpatient Medications:     azelastine (Astelin) 137 mcg (0.1 %) nasal spray, Administer 1 spray into each nostril 2 times a day. Use in each nostril as directed, Disp: , Rfl:     cetirizine (ZyrTEC) 10 mg chewable tablet, Chew once daily., Disp: , Rfl:     cholecalciferol (Vitamin D3) 1,250 mcg (50,000 unit) tablet, Take 1 tablet (50,000 Units) by mouth every 7 days., Disp: 12 tablet, Rfl: 0    cyclobenzaprine (Flexeril) 10 mg tablet, Take 1 tablet (10 mg) by mouth 3 times a day as needed for muscle spasms., Disp: , Rfl:     escitalopram (Lexapro) 10 mg tablet, TAKE 1 TABLET BY MOUTH EVERY DAY, Disp: 90 tablet, Rfl: 0    fluticasone (Flonase) 50 mcg/actuation nasal spray, Administer 1 spray into each nostril once daily. Shake gently. Before first use, prime pump. After use, clean tip and replace cap., Disp: 16 g, Rfl: 5    ibuprofen 800 mg tablet, Take 1 tablet (800 mg) by mouth every 6 hours if needed for headaches, mild pain (1 - 3) or moderate pain (4 -  6)., Disp: , Rfl:     levothyroxine (Synthroid, Levoxyl) 50 mcg tablet, Take 1 tablet (50 mcg) by mouth once daily., Disp: , Rfl:     lisinopril 20 mg tablet, Take 1 tablet (20 mg) by mouth once daily., Disp: , Rfl:     omeprazole (PriLOSEC) 40 mg DR capsule, Take 1 capsule (40 mg) by mouth once daily in the morning. Take before meals. Do not crush or chew., Disp: 90 capsule, Rfl: 3    propranolol (Inderal) 10 mg tablet, TAKE 1 TABLET BY MOUTH TWICE A DAY, Disp: 180 tablet, Rfl: 0    wheat dextrin (Benefiber Healthy Shape) 5 gram/7.4 gram powder, Take 1 teaspoon daily, Disp: 248 g, Rfl: 1    Past Medical History:   Diagnosis Date    Allergic     Avascular necrosis of bone (CMS/HCC) 2019    both hips, from steroids    Disease of thyroid gland     Fibromyalgia     Hypertension     POTS (postural orthostatic tachycardia syndrome)        Past Surgical History:   Procedure Laterality Date    HYSTERECTOMY      1 ovary left    LABYRINTHECTOMY      OTHER SURGICAL HISTORY  01/07/2020    Laparoscopic hysterectomy    RENAL BIOPSY         Family History   Problem Relation Name Age of Onset    No Known Problems Mother         Objective   There were no vitals taken for this visit.    Physical Exam    Assessment/Plan

## 2024-04-11 ENCOUNTER — APPOINTMENT (OUTPATIENT)
Dept: NEUROLOGY | Facility: HOSPITAL | Age: 39
End: 2024-04-11
Payer: COMMERCIAL

## 2024-04-11 ENCOUNTER — DOCUMENTATION (OUTPATIENT)
Dept: OTHER | Facility: CLINIC | Age: 39
End: 2024-04-11
Payer: COMMERCIAL

## 2024-04-16 NOTE — PROGRESS NOTES
INGE Virtual The virtual visit conducted with Audio and Video.    Verbal consent was given for the following virtual visit, patient is currently located in [place]. All issues discussed and addressed below were done so without a physical examination. If it was felt the patient needed be seen in clinic in person they were directed there.     Subjective   COVID-19 Infection Date:  12/14/2020 confirmed via rapid home antigen test (sx: Fever, Fatigue, Loss or disturbed smell, Headache, Sore throat, Cough, Pain on breathing, Loss or disturbed taste, Muscle pain, Sinus pressure, Shortness of breath, Chest pain, Runny nose, Abdominal pain  - no hospitalization, treated with Augmentin and prednisone)  December 2021 confimed via rapid home antigen test (sx: Fever, Cough, Runny nose, Sore throat, Sinus Pressure - no hospitalization, treated with Augmentin and Prednisone for sinusitis)  Spring 2022 confirmed via rapid home antigen test (sx: Fever, Cough, Runny nose, Sore throat, Sinus Pressure  - no hospitalization, treated with Augmentin and prednisone for sinusitis)  9/20/2023 confirmed via rapid home antigen test (sx: Fever, Cough, Shortness of breath, Fatigue, Pain on breathing, Chest pain, Loss or disturbed smell, Loss or disturbed taste, Runny nose, Headache, Muscle pain, Abdominal pain, Diarrhea, Brain fog, Sore throat, Sinus pressure  - no hospitalization, treated with Prednisone for sinusitis)    COVID-19 vaccine status: Pfizer-Fast Asset 11/20/21, 12/11/21, 5/17/22     Occupation: full-time  for [employer] working remotely    Current Providers: PCP Faustina Soria, Cardiology Dr. Aleman, GI MICHAEL Dyson, ENT CNP Naomi    Survey Scores: 01/2024  PHQ-9: 16    CHAITANYA-7: 6    Sleep Wellness: 9    FSS average: 6.56    Modified Ecog average: 2.17    MOCA: 21/22 (02/2024)  Overall Health: 50 -> 45     38 y.o. female with a h/o COVID-19 in December 2020, December 2021, Spring 2022, September 2023,  hypothyroidism, endometriosis, fibromyalgia, POTS, HTN, migraines, obesity, IBS, [facility] with c/o sinus complaints, GI complaints, dizziness, palpitations and HR irregularities, pain, anxiety and depression, fatigue, brain fog, headache, tinnitus, smell and taste changes, shortness of breath, cough, chest pain, rashes/skin flushing, numbness and tingling, muscle cramps, tremors, muscle weakness, weight loss goal     She missed Neurology appointment, was very sick with respiratory illness, best friend positive for COVID, she herself was negative for COVID. Mostly improved now, still congested. Hard to tell what is illness vs LC symptoms.  Heart rate has improved with propranolol, BP has come down with this as well  Cardiology changed propranolol to three times daily, that has helped a lot  HR has ranged around from , generally balancing around 55 and 120  Still with HR increase when standing up, often has presyncopal episodes, but no more syncopal epidoses  Learning better on how to balance rest and activity  Biggest complaint is at night-time she has ngiht sweats that soak the bed  During the day also either very cold or very hot, gets physically ill when she goes out into the heat  When she gets overheated at all, she starts sweating, HR elevation, nausea  Lost significant weight a few years ago, lowest was 198lbs, now has gained weight back  Wants to lose weight, has many food aversions from COVID still, exercise has been difficult  With fibromyalgia and EDS is also worried about additional weight on her joints  Craving carbs, mostly tolerating meat, has to force herself to eat vegetables or fruit because they taste so poorly  Eggs and peanuts are very repulsive  Still having phantom smells, smelling cigarette smoke 75% of the time, beyond that cannot smell well  Saw ENT provider who diagnosed her with LPR, omeprazole has been increased but has not noted  improvement  Has CT scan of sinus coming up, will be using saline rinses prior to CT, keeping up with nose sprays  Notes that some things are smelling correctly, at lower level  Breathing worsened when she was sick recently, O2 dropped to 80s a few times when moving around  Has CPET scheduled to further investigate, worried about being able to do it   Mentally doing much better with Lexapro, overwhelmed because she has so many doctor's appointments  Has appointment scheduled for next month  Will qualify for FMLA next month  IBS-C history, for the past month her BM has been weird, has had accidents twice because stool just comes, going 4-5 times per day, has to push and it is very skinny pudding consistency  Brain fog has been very bad, affecting her work, learning new tasks is near impossible, turned down job advancement offer  Was diagnosed with ADHD as a young child, was never on medication, feels that her ADHD is worse  Notes that ADHD is not much worse, that appears to be hindering her more than some of her other physical problems  Has been prone to headaches her whole life, talked to ENT about that, was told it is chronic migraines when she was younger, believes that she is getting cluster headaches, steroids takes pain away pretty instantly  ENT referred her to headache specialist, that is scheduled in August, overall that has improved over the past year  A little bit of tremors, nothing that has changed up or down, parkinson on both sides of her family  Hands shake a little bit when she has a bad POTS day  Muscle cramps still occur everywhere, any part of her body, 24/7 pain in her left back/trunk, worst when sneezing  Concerned for EDS, mother, grandmother, sister, and aunt all have the same symptom  She is very mobile, skin is very stretchy, has stretch marks everywhere  Still getting chest pain, too, overall lessened, often still there in the morning or when she overdoes it  Was getting sick every time she  ate and that has improved  Still coughing a lot at night, even with omeprazole  Numbness and tingling is around the same, feet have been worse lately  One of her toes is growing a second nail on top of it, was able to peel off, no discoloration or smell  Cannot feel anything when she touches her foot  Has had worsening pain in joints, most specifically in wrists and forearms, aching pain from hands half-way up her arm  Has muscle relaxer that she takes at night to help her sleep, otherwise cannot fall asleep for 2-3 hours  More tired in general, used to be a ollie, getting 8 hours of sleep but not refreshed  Will address tinnitus with ENT provider when she sees her next  Still getting rash on her face that looks like a lupus rash, that comes periodically over many years  Her whole body changed 3-4 years ago, cannot tolerate the sun anymore     Relevant prior healthcare visits:  -03/2024 ENT for LPR, recurrent acute sinusitis, post-nasal drainage, chronic cough, headache and facial pain, dysosmia in light of PASC. Recommends to continue Flonase and Azelastine, restart saline irrigations, prescribed doxycycline, ordered CT sinus, conservative measures for GERD and PPI trial, referral to neurology for ? Migraines  -03/2024 Cardiology Dr. Kam ordered cPET for PASC-related exercise intolerance, chest pain, tachycardia, DELGADO  -02/2024 PCP prescribed Flonase and medrol dose pack for sinus complaint  -01/2024 Cardiology notes HTN is not fully controlled but given fatigue and hypotensive type symptoms recommends tracking this for now, dyspnea is of unclear etiology with unremarkable testing, recommends exercise, for POTS/fibromyalgia/LC recommends trying nontraditional therapies  -12/2023 Cardiology notes normal echocardiogram, PFTs reviewed with pulmonary who does not feel that abnormalities notes are significant, if dyspnea worsens then would consider neuromuscular work-up, recommends weaning off metoprolol to see if  symptoms improved without  -12/2023 ED for syncope in shower and foot injury  -10/2023 GI ordered fibroscan for fatty liver, fiber supplement and miralax for IBS-C, omeprazole and gallbladder US for RUQ abdominal pain, referred to cardiology for HTN and tachycardia  -11/2022 Sleep Medicine [facility] EDS, recommends follow-up with rheumatology as scheduled, PSG and MSLT    Relevant prior diagnostic studies:  -04/2024 HSAT shows very mild ED with O2 garret 83.1  -02/2024 autonomic testing consistent with POTS  -02/2024 CTA chest shows no PE and clear lungs  -12/2023 CXR with clear lungs  -11/2023 PFTs normal  -11/2023 FibroSCAN shows stage 0-1 disease and severe steatosis  -11/2023 echocardiogram with LVEF 50%, borderline pulmonary HTN with RVSP 33-35 mmHg, endocardial walls not well-visualized leading to inability to accurately calculation of EF with possible mild hypokinesis in the anterior lateral wall but this is very poorly visualized  -10/2023 US gallbladder unremarkable, prominent liver  -10/2023 CT abdomen and pelvis unremarkable   -03/2023 colonoscopy normal    Relevant prior laboratory values, unremarkable unless noted:  -02/2024 GGT, cryoglobulin, urine porphobilinogen, urine metanephrines, urine organic acids with elevation in lactic acid, urine copper, UPEP, aldolase, catecholamines, carnitine, TPO ab, Dys-2, Vitamin E, Anti-thyroglobulin ab, T3, T4, anti-parietal cell ab, paraneoplastic ab, mycoplasma pn IgM negative and IgG positive, MMA, homocysteine, serum copper, CoQ10, Mag, Vitamin D 13, Vitamin B12 302, CMP, CK, BNP, CCP, iron studies, TSH, ESR 34, RF, D-Dimer 840, CBC/D, HbA1c, Folate, Ferritin, CRP 1.28, JEET, am cortisol, Vitamin B6, Vitamin B1, Troponin, SPEP, Tryptase  -12/2023 Troponin, CMP, CBC/D  -10/2023 iron studies, Hepatitis panel, Anti-smooth muscle ab, anti-mitochondrial ab, [id number], Mag, Lipase, D-Dimer  705, Vitamin D, TSH  -10/2022 ferritin, T4, vitamin B12 377, JEET, CCP ab,  sjogren ab, TPO ab, thyroglubin ab  -05/2022 HbA1c 4.8%, RF, CRP 1.3, ESR, uric acid    Exercise routine: none  Diet:  Weight hx: pre-COVID-19 230  lbs -> post-COVID-19 255  lbs -> 250 lbs  Substance use: former tobacco use, 1-2 servings ETOH monthly or less   Social:       Current Outpatient Medications:     azelastine (Astelin) 137 mcg (0.1 %) nasal spray, Administer 1 spray into each nostril 2 times a day. Use in each nostril as directed, Disp: , Rfl:     cetirizine (ZyrTEC) 10 mg chewable tablet, Chew once daily., Disp: , Rfl:     cholecalciferol (Vitamin D3) 1,250 mcg (50,000 unit) tablet, Take 1 tablet (50,000 Units) by mouth every 7 days., Disp: 12 tablet, Rfl: 0    cyclobenzaprine (Flexeril) 10 mg tablet, Take 1 tablet (10 mg) by mouth 3 times a day as needed for muscle spasms., Disp: , Rfl:     escitalopram (Lexapro) 10 mg tablet, TAKE 1 TABLET BY MOUTH EVERY DAY, Disp: 90 tablet, Rfl: 0    fluticasone (Flonase) 50 mcg/actuation nasal spray, Administer 1 spray into each nostril once daily. Shake gently. Before first use, prime pump. After use, clean tip and replace cap., Disp: 16 g, Rfl: 5    ibuprofen 800 mg tablet, Take 1 tablet (800 mg) by mouth every 6 hours if needed for headaches, mild pain (1 - 3) or moderate pain (4 - 6)., Disp: , Rfl:     levothyroxine (Synthroid, Levoxyl) 50 mcg tablet, Take 1 tablet (50 mcg) by mouth once daily., Disp: , Rfl:     lisinopril 20 mg tablet, Take 1 tablet (20 mg) by mouth once daily., Disp: , Rfl:     omeprazole (PriLOSEC) 40 mg DR capsule, Take 1 capsule (40 mg) by mouth once daily in the morning. Take before meals. Do not crush or chew., Disp: 90 capsule, Rfl: 3    propranolol (Inderal) 10 mg tablet, TAKE 1 TABLET BY MOUTH TWICE A DAY (Patient taking differently: Take 1 tablet (10 mg) by mouth 3 times a day.), Disp: 180 tablet, Rfl: 0    Past Medical History:   Diagnosis Date    Allergic     Avascular necrosis of bone (Multi) 2019    both hips, from steroids     Disease of thyroid gland     Fibromyalgia     Hypertension     POTS (postural orthostatic tachycardia syndrome)        Past Surgical History:   Procedure Laterality Date    HYSTERECTOMY      1 ovary left    LABYRINTHECTOMY      OTHER SURGICAL HISTORY  01/07/2020    Laparoscopic hysterectomy    RENAL BIOPSY         Family History   Problem Relation Name Age of Onset    No Known Problems Mother         Objective   There were no vitals taken for this visit.    Physical Exam  Constitutional:       Comments: no acute distress, alert/conversational, appropriate affect, no focal neurological deficits noted via video appointment          Assessment/Plan   Problem List Items Addressed This Visit             ICD-10-CM       High    Post-acute sequelae of COVID-19 (PASC) - Primary U09.9     04/2024:   sinus complaints, GI complaints, dizziness, palpitations and HR irregularities, pain, anxiety and depression, fatigue, brain fog, headache, tinnitus, smell and taste changes, shortness of breath, cough, chest pain, rashes/skin flushing, numbness and tingling, muscle cramps, tremors, muscle weakness, weight loss goal  -repeat Vitamin D and B12 level in 2-3 weeks  -smell retraining therapy   -you can send paperwork for intermittent FMLA to [email or web address] or Fax Number [phone]   -try Gavascon Advance to help further with LPR  -reach out to GI about stool changes  -discuss ADHD treatment option with your PCP  -consultation with Dr. Davis in autonomic clinic will be scheduled on June 13th  -start mitchondrial cocktail, this will be emailed to you as well  -continue propranolol at 10mg three times daily and use conservative measures to help with POTS  -continue Lexapro at current dose and follow-up with Psychology as scheduled  -proceed with CPET as recommended by cardiology  -follow-up with genetics for concern for EDS  -follow-up with Neurology as scheduled for headaches and tremors  -we will  "consider OT and PT for physical and cognitive [facility] after work-up is completed  -additional recommendations provided under \"Patient Education\" section     02/2024: sinus complaints, GI complaints, dizziness, palpitations and HR irregularities, pain, anxiety and depression, fatigue, brain fog, headache, tinnitus, smell and taste changes, shortness of breath, cough, chest pain, rashes/skin flushing, numbness and tingling, muscle cramps, tremors, muscle weakness  -comprehensive blood work, please have this drawn in the morning, fasting except for water   -tilt table test to rule out dysautonomia as a cause of your dizziness   -Home Sleep Study to rule out sleep apnea as a cause/contribution to your symptoms   -diaphragmatic breathing exercises  -referral to Cardiologist Dr. Fabian Kam  -restart propranolol at 10mg twice daily, we will consider increasing this dose depending on how you feel   -for anxiety and depression, I prescribed Lexapro 10mg daily and referred you to [facility] Psychologist Nu Fitzpatrick  -for sinus complaints, I referred you to rhinology   -I will reach out to the  neurologist with whom you are scheduled next month to ask if they treat dysautonomia  -> D supplement, B12 supplement          "

## 2024-04-19 ENCOUNTER — TELEMEDICINE (OUTPATIENT)
Dept: OTHER | Facility: CLINIC | Age: 39
End: 2024-04-19
Payer: COMMERCIAL

## 2024-04-19 DIAGNOSIS — U09.9 POST-ACUTE SEQUELAE OF COVID-19 (PASC): Primary | ICD-10-CM

## 2024-04-19 PROCEDURE — 99215 OFFICE O/P EST HI 40 MIN: CPT | Mod: ZK,95 | Performed by: NURSE PRACTITIONER

## 2024-04-19 PROCEDURE — 99417 PROLNG OP E/M EACH 15 MIN: CPT | Performed by: NURSE PRACTITIONER

## 2024-04-19 PROCEDURE — 1036F TOBACCO NON-USER: CPT | Performed by: NURSE PRACTITIONER

## 2024-04-19 PROCEDURE — 99215 OFFICE O/P EST HI 40 MIN: CPT | Performed by: NURSE PRACTITIONER

## 2024-04-19 PROCEDURE — 99417 PROLNG OP E/M EACH 15 MIN: CPT | Mod: ZK | Performed by: NURSE PRACTITIONER

## 2024-04-19 NOTE — Clinical Note
60min virtual FUV in August please Also please email her smell retraining therapy and mitochondrial cocktail. Thank you!

## 2024-04-19 NOTE — PATIENT INSTRUCTIONS
It was my pleasure seeing you in the [facility] today.  We will focus on addressing the following symptoms discussed today: sinus complaints, GI complaints, dizziness, palpitations and HR irregularities, pain, anxiety and depression, fatigue, brain fog, headache, tinnitus, smell and taste changes, shortness of breath, cough, chest pain, rashes/skin flushing, numbness and tingling, muscle cramps, tremors, muscle weakness, weight loss goal    My recommendations are as follows:  -repeat Vitamin D and B12 level in 2-3 weeks  -smell retraining therapy   -you can send paperwork for intermittent FMLA to [email or web address] or Fax Number [phone]   -try Gavascon Advance to help further with LPR  -reach out to GI about stool changes  -discuss ADHD treatment option with your PCP  -consultation with Dr. Davis in autonomic clinic will be scheduled on June 13th  -start mitchondrial cocktail, this will be emailed to you as well  -continue propranolol at 10mg three times daily and use conservative measures to help with POTS  -continue Lexapro at current dose and follow-up with Psychology as scheduled  -proceed with CPET as recommended by cardiology  -follow-up with genetics for concern for EDS  -follow-up with Neurology as scheduled for headaches and tremors  -we will consider OT and PT for physical and cognitive Long COVID Rehabilitation after work-up is completed    GAVASCON ADVANCE: Work s by creating a ‘raft’ to prevent acid regurgitation.  12 years and over: 5-10ml after meals and at bedtime  https://www.refluxgate.com/gaviscon-advance  US formulation is 10x less concentrated  alginates than  version - so order  only Gavascon Advance online.  Online At: [employer]; Veronica Devries,  GOOGLE to find additional suppliers     conservative measures to help with dysautonomia symptoms:  --drinking 1 gallon of water/day (or a mix of fluids, non sugary and minimally caffeinated)   --Eating 6g  "of table salt (3 tsp) in addition to salt used in cooking of naturally present in food. Avoid salt tablets and monitor BP closely  --Wearing compression stockings, grade 40-50 mmHg up to the waist every day, to be removed before bedtime  --Sleeping with the head of the bed up 45 degrees, even for short naps. Avoid stacking pillow or wedges under the neck or the back. Bend the mattress in the middle and raise the head part and stack bricks underneath. Or invest  in an adjustable bed.  --Water jogging (ie. running in the shallow part of the pool, water waist or chest level), 1 hour a day or 90 minutes 3 times a week.     Tips to help improve brain fog and fatigue:  --avoid drinking Alcohol while recovering from COVID  --ensure to practice 30 minutes of exercise 7 days per week to keep BNDGF (brain derived neurotrophic growth factor) elevated as this will help in the regeneration of neurons, you may split exercise time up into 5 minute increments if this is better tolerated.   --Focus on eating a whole foods rich in fiber such as vegetables, fruits, beans, nuts, legumes, seeds, whole grains. Avoid processed foods and beverages. Eliminate added sugars, artificial sweeteners, processed oils, artificial dyes.  --slowly increase your activity by no more than 10% per week, rest when you feel tired  --utilize pacing techniques to manage fatigue, schedule rest times throughout the day so you do not run out of energy, more information to be found on this here: http://www.[email or web address].ca/health-info-site/Documents/post_covid-19_fatigue.pdf  --Review the  Health Talk on Managing Fatigue and Thinking Changes after COVID-19 here: https://www.hospitals.org/Health-Talks/articles/2022/05/managing-fatigue-and-thinking-changes-after-covid-19  --You can also find many helpful tips and tricks in this book: \"The Long COVID self-help guide, practical ways to manage symptoms\" by [facility] from the [facility] [place]  --another book " "you may find helpful: \"Clearing the Fog\" by Dr. Dawson Guardado     To help improve sleep:  --try Melatonin children's liquid drops 1-2mg underneath your tongue 30 minutes before you go to bed  --go to bed at the same time each night and get up at the same time each morning, including the weekend  --goal of 7-9 hours of uninterrupted sleep per night  --make sure your bedroom is quiet, dark, relaxing, and at a comfortable temperature  --remove electronic devices, such as TVs, computers, and smart phones, from your bedroom  --avoid caffeine after the morning and large meals before bed  --drink plenty of water throughout the day but avoid drinking fluids two hours before bed  --expose yourself to bright light in the morning  --engage in a calming bed-time routine of warm bath/shower, gratitude journal, guided meditation, etc.  --do not lay awake in bed for more than 20 minutes, get up and engage in a soothing activity such as reading a boring book until you feel sleepy     General headache recommendations:  -avoid common triggers which include red wine, dark beer, aged cheese, nuts, onions, chocolate, aspartame, processed meats and nitrates, excessive caffeine, caffeine withdrawal, fasting, skipping meals, barometric pressure change, bright light, poor air quality, odors  -avoid overuse of OTC medications such as Ibuprofen, Naproxen, Excedrin, etc. as this can lead to rebound headaches  -maintain a stable and consistent sleep schedule, even on weekends  -stay well-hydrated with non-caffeinated beverages  -avoid skipping r delaying meals  -aim for 30 minutes of movement per day, find something you enjoy so it doesn't feel like a chore  -Mind-Body and Stress Managements tools include acupuncture, chiropractic care, yoga, meditation, psychotherapy     Tips to help with tinnitus:  -consider acupuncture treatments through [facility]  -consider using a journal to document triggers such as increased salt intake, " caffeine, stress  -reduce exposure to NSAIDs (Ibuprofen, Naproxen, Advil)  -reduce alcohol intake  -avoid artificial sweeteners including aspartame  -use ear protection when exposed to loud noise  -Acupressure for Tinnitus <https://www.youPRNMS INVESTMENTSube.com/watch?v=nN54fh2Rkn3&t=57s>   -Stress Management through mindfulness, yoga  -breath work with Vannessa flores- humming bee breath- 5 minutes, twice daily  https://www.getbetter!ube.com/watch?v=kLG59q7aEi7&t=88s     We will send a message in [employer] or call you with the results of your tests.  Further recommendations will follow based on testing results and your symptoms.   Please return to [facility] Clinic in 4 months, call [phone] or send a message through your [employer] luiz if needed.    Please also consider attending  PICS support group for long-COVID and post-ICU patients. To contact support group, email [email or web address] or call [phone]

## 2024-04-19 NOTE — ASSESSMENT & PLAN NOTE
"04/2024:   sinus complaints, GI complaints, dizziness, palpitations and HR irregularities, pain, anxiety and depression, fatigue, brain fog, headache, tinnitus, smell and taste changes, shortness of breath, cough, chest pain, rashes/skin flushing, numbness and tingling, muscle cramps, tremors, muscle weakness, weight loss goal  -repeat Vitamin D and B12 level in 2-3 weeks  -smell retraining therapy   -you can send paperwork for intermittent FMLA to [email or web address] or Fax Number [phone]   -try [employer] to help further with LPR  -reach out to GI about stool changes  -discuss ADHD treatment option with your PCP  -consultation with Dr. Davis in autonomic clinic will be scheduled on June 13th  -start mitchondrial cocktail, this will be emailed to you as well  -continue propranolol at 10mg three times daily and use conservative measures to help with POTS  -continue Lexapro at current dose and follow-up with Psychology as scheduled  -proceed with CPET as recommended by cardiology  -follow-up with genetics for concern for EDS  -follow-up with Neurology as scheduled for headaches and tremors  -we will consider OT and PT for physical and cognitive Long COVID Rehabilitation after work-up is completed  -additional recommendations provided under \"Patient Education\" section     02/2024: sinus complaints, GI complaints, dizziness, palpitations and HR irregularities, pain, anxiety and depression, fatigue, brain fog, headache, tinnitus, smell and taste changes, shortness of breath, cough, chest pain, rashes/skin flushing, numbness and tingling, muscle cramps, tremors, muscle weakness  -comprehensive blood work, please have this drawn in the morning, fasting except for water   -tilt table test to rule out dysautonomia as a cause of your dizziness   -Home Sleep Study to rule out sleep apnea as a cause/contribution to your symptoms   -diaphragmatic breathing exercises  -referral to Cardiologist " Dr. Fabian Kam  -restart propranolol at 10mg twice daily, we will consider increasing this dose depending on how you feel   -for anxiety and depression, I prescribed Lexapro 10mg daily and referred you to Shailesh PEACOCK Psychologist Nu Fitzpatrick  -for sinus complaints, I referred you to rhinology   -I will reach out to the  neurologist with whom you are scheduled next month to ask if they treat dysautonomia  -> D supplement, B12 supplement

## 2024-04-23 ENCOUNTER — APPOINTMENT (OUTPATIENT)
Dept: RADIOLOGY | Facility: CLINIC | Age: 39
End: 2024-04-23
Payer: COMMERCIAL

## 2024-04-23 ENCOUNTER — APPOINTMENT (OUTPATIENT)
Dept: RADIOLOGY | Facility: HOSPITAL | Age: 39
End: 2024-04-23
Payer: COMMERCIAL

## 2024-04-24 ENCOUNTER — E-VISIT (OUTPATIENT)
Dept: CARDIOLOGY | Facility: CLINIC | Age: 39
End: 2024-04-24
Payer: COMMERCIAL

## 2024-04-24 DIAGNOSIS — R00.2 PALPITATIONS: ICD-10-CM

## 2024-04-24 DIAGNOSIS — U09.9 POST-ACUTE SEQUELAE OF COVID-19 (PASC): ICD-10-CM

## 2024-04-24 RX ORDER — PROPRANOLOL HYDROCHLORIDE 10 MG/1
10 TABLET ORAL 3 TIMES DAILY
Qty: 270 TABLET | Refills: 3 | Status: SHIPPED | OUTPATIENT
Start: 2024-04-24

## 2024-05-01 ENCOUNTER — TELEMEDICINE (OUTPATIENT)
Dept: PRIMARY CARE | Facility: CLINIC | Age: 39
End: 2024-05-01
Payer: COMMERCIAL

## 2024-05-01 DIAGNOSIS — J34.89 SINUS PAIN: Primary | ICD-10-CM

## 2024-05-01 PROCEDURE — 1036F TOBACCO NON-USER: CPT | Performed by: FAMILY MEDICINE

## 2024-05-01 PROCEDURE — 99213 OFFICE O/P EST LOW 20 MIN: CPT | Performed by: FAMILY MEDICINE

## 2024-05-01 RX ORDER — PREDNISONE 20 MG/1
40 TABLET ORAL DAILY
Qty: 10 TABLET | Refills: 0 | Status: SHIPPED | OUTPATIENT
Start: 2024-05-01 | End: 2024-05-06

## 2024-05-01 NOTE — PROGRESS NOTES
Subjective   Rachana Morton is a 38 y.o. female who presents for evaluation of possible sinus infection. Symptoms include left ear pressure/pain, congestion, facial pain, post nasal drip, and sinus pressure with no fever, chills, night sweats or weight loss. Onset of symptoms was 3 days ago, rapidly worsening since that time. She is drinking plenty of fluids. Past history is significant for  allergies .    Objective   There were no vitals taken for this visit.    38 year old female  A&Ox3  No facial abnormality.  Left maxillary sinus tenderness : demonstrated by patient.  Physical Exam    Assessment/Plan   Acute allergic sinusitis.    1.  Warm compress,  2.  prednisone  3. Nasal saline rinses as needed for congestion.  4. Follow-up with PCP in 5 days if symptoms worsen or persist.

## 2024-05-07 ENCOUNTER — HOSPITAL ENCOUNTER (OUTPATIENT)
Dept: RADIOLOGY | Facility: HOSPITAL | Age: 39
Discharge: HOME | End: 2024-05-07
Payer: COMMERCIAL

## 2024-05-07 DIAGNOSIS — J01.81 OTHER ACUTE RECURRENT SINUSITIS: ICD-10-CM

## 2024-05-07 PROCEDURE — 70486 CT MAXILLOFACIAL W/O DYE: CPT

## 2024-05-08 ENCOUNTER — TELEPHONE (OUTPATIENT)
Dept: OTOLARYNGOLOGY | Facility: HOSPITAL | Age: 39
End: 2024-05-08
Payer: COMMERCIAL

## 2024-05-08 DIAGNOSIS — E55.9 VITAMIN D DEFICIENCY: ICD-10-CM

## 2024-05-08 DIAGNOSIS — J32.8 OTHER CHRONIC SINUSITIS: Primary | ICD-10-CM

## 2024-05-08 RX ORDER — ASPIRIN 325 MG
50000 TABLET, DELAYED RELEASE (ENTERIC COATED) ORAL
Qty: 12 CAPSULE | OUTPATIENT
Start: 2024-05-12

## 2024-05-08 NOTE — TELEPHONE ENCOUNTER
Result Communication  3:05 PM    Recent CT sinus imaging results from 5/7/24 were successfully communicated with the patient and they acknowledged their understanding.    PLAN:  -Continue Flonase nasal spray 2 puffs each nostril daily.   -Continue daily nasal saline irrigations.   -I recommended referral to my rhinology physician partner Dr. Bobby Richard for further evaluation and management of epistaxis. Referral was electronically submitted and patient informed that Dr. Richard's nurse (Gwen Rodriguez RN) will contact the patient for scheduling.   -Follow-up: Patient will follow-up with me virtually on 5/31 to discuss treatment outcomes for LPR. Patient agreeable to plan. All questions answered to patient's satisfaction.

## 2024-05-08 NOTE — TELEPHONE ENCOUNTER
Spoke with patient and informed her that we will need labs drawn before a refill can be called in.  Patient voiced understanding.

## 2024-05-14 ENCOUNTER — LAB (OUTPATIENT)
Dept: LAB | Facility: LAB | Age: 39
End: 2024-05-14
Payer: COMMERCIAL

## 2024-05-14 ENCOUNTER — APPOINTMENT (OUTPATIENT)
Dept: BEHAVIORAL HEALTH | Facility: CLINIC | Age: 39
End: 2024-05-14
Payer: COMMERCIAL

## 2024-05-14 DIAGNOSIS — U09.9 POST-ACUTE SEQUELAE OF COVID-19 (PASC): ICD-10-CM

## 2024-05-14 LAB
25(OH)D3 SERPL-MCNC: 38 NG/ML (ref 30–100)
VIT B12 SERPL-MCNC: 250 PG/ML (ref 211–911)

## 2024-05-14 PROCEDURE — 82607 VITAMIN B-12: CPT

## 2024-05-14 PROCEDURE — 36415 COLL VENOUS BLD VENIPUNCTURE: CPT

## 2024-05-14 PROCEDURE — 82306 VITAMIN D 25 HYDROXY: CPT

## 2024-05-16 ENCOUNTER — OFFICE VISIT (OUTPATIENT)
Dept: OTOLARYNGOLOGY | Facility: CLINIC | Age: 39
End: 2024-05-16
Payer: COMMERCIAL

## 2024-05-16 VITALS — HEIGHT: 69 IN | TEMPERATURE: 96.8 F | WEIGHT: 251.5 LBS | BODY MASS INDEX: 37.25 KG/M2

## 2024-05-16 DIAGNOSIS — J34.2 DEVIATED NASAL SEPTUM: ICD-10-CM

## 2024-05-16 DIAGNOSIS — J32.2 CHRONIC ETHMOIDAL SINUSITIS: ICD-10-CM

## 2024-05-16 DIAGNOSIS — J32.0 CHRONIC MAXILLARY SINUSITIS: Primary | ICD-10-CM

## 2024-05-16 DIAGNOSIS — R09.81 NASAL CONGESTION: ICD-10-CM

## 2024-05-16 PROCEDURE — 1036F TOBACCO NON-USER: CPT | Performed by: OTOLARYNGOLOGY

## 2024-05-16 PROCEDURE — 99204 OFFICE O/P NEW MOD 45 MIN: CPT | Performed by: OTOLARYNGOLOGY

## 2024-05-16 PROCEDURE — 31231 NASAL ENDOSCOPY DX: CPT | Performed by: OTOLARYNGOLOGY

## 2024-05-16 RX ORDER — AMOXICILLIN AND CLAVULANATE POTASSIUM 875; 125 MG/1; MG/1
875 TABLET, FILM COATED ORAL 2 TIMES DAILY
Qty: 20 TABLET | Refills: 0 | Status: SHIPPED | OUTPATIENT
Start: 2024-05-16 | End: 2024-05-26

## 2024-05-16 ASSESSMENT — PATIENT HEALTH QUESTIONNAIRE - PHQ9
2. FEELING DOWN, DEPRESSED OR HOPELESS: NOT AT ALL
1. LITTLE INTEREST OR PLEASURE IN DOING THINGS: NOT AT ALL
SUM OF ALL RESPONSES TO PHQ9 QUESTIONS 1 AND 2: 0

## 2024-05-16 NOTE — PROGRESS NOTES
Chief Complaint:  Chronic sinusitis     History Of Present Illness:  Reason For Visit:     Rachana presents in consultation through Patricia Salgado CNP, for chronic sinusitis.    The Symptoms Are: intermittent and have been present for many years.  In the last 12 months, the patient has had 3-4 sinus infections.  In the last 12 months, the patient has been treated with 2-3 antibiotic course(s).    She had the coronavirus 4 times between 2020 and 2023.  Her most recent positive test was in September 2023.  She has had a significant elevation of symptoms since September including postnasal drainage, facial pain and pressure, and coughing.  She was seen by Gayatri Salgado in March 2023.  The plan was to continue Flonase and azelastine and initiate saline rinses as well as a 14-day course of doxycycline and she was prescribed omeprazole 40 mg at that time.  A CT sinus was ordered.  During her next assessment May 8, 2024 she was referred to my care based on the CT findings and her ongoing symptoms.    She has had recurrent sinus infections for a number of years but upon starting Azelastine about 2 years ago she has had a significant decline in the frequency of exacerbations.  She was having 6 or 8 infections per year but over the last 2 years she has been having about 3 or 4 infections.  She does not always take antibiotics for the infections.    She has had the phantom smell of cigarette smoke that has been rather persistent for the last 3 years following having the coronavirus.  She is followed through the coronavirus long-ha clinic.    Main Symptoms:  Patient does not have anterior nasal drainage.     Patient has  posterior nasal drainage.    Patient has nasal airway obstruction.  ~50% of the time   Patient has  facial pain.   over eyes   Patient has  facial pressure.   over eyes   Patient does not have decreased sense of smell.  had smell decline with covid, but now at baseline.  Associated Symptoms:   Patient does  "not have  headaches.    Patient has throat clearing.    Patient has coughing.    Patient has dysphonia.  intermittent   Patient has nasal bleeding.  Notes she's an \"easy bleeder\"     Medications currently on for sinonasal symptoms: Azelastine 2 puffs each side once daily, Flonase 2 puffs each side once daily, saline irrigations as needed   Medications tried in the past for sinonasal symptoms:   Antibiotics     Other Pertinent Medical Conditions:   Patient does not have asthma.    Patient does not have aspirin sensitivity.    Patient has migraines.    Patient has history of allergy testing. When: as a child, unsure of results. Patient does not have history of IT.   Patient does not have history of sinus surgery.    Patient does not have history of nasal fracture.    Patient has heartburn.    The patient does take medical therapy for heartburn.  Omeprazole 40 mg  The patient does not have a GI evaluation.    The patient has imaging of sinuses. When: 5/7/24    The remainder of a full 10 systems review of systems was pan negative outside of that stated in the history of present illness.    Active Problems:  Patient Active Problem List   Diagnosis    History of high blood pressure    History of thyroid disorder    Chronic pelvic pain in female    Endometriosis    Fibromyalgia    Essential hypertension    Fatigue    Hypothyroidism, acquired    Migraine headache    Obesity, Class I, BMI 30-34.9    Polyarthralgia    Tachycardia    Syncope and collapse    Steatosis of liver    Sprain of left foot    Dyspnea on exertion    Post-acute sequelae of COVID-19 (PASC)      Past Medical History:  She has a past medical history of Allergic, Avascular necrosis of bone (Multi) (2019), Disease of thyroid gland, Fibromyalgia, Hypertension, and POTS (postural orthostatic tachycardia syndrome).    Surgical History:  She has a past surgical history that includes Other surgical history (01/07/2020); Hysterectomy; Labyrinthectomy; and Renal " "biopsy.     Family History:  Family History   Problem Relation Name Age of Onset    No Known Problems Mother       Social History:  She reports that she has quit smoking. Her smoking use included cigarettes. She has never used smokeless tobacco. She reports current alcohol use. She reports current drug use. Drug: Marijuana.     Allergies:  Morphine    Current Meds:  Current Outpatient Medications:     azelastine (Astelin) 137 mcg (0.1 %) nasal spray, Administer 1 spray into each nostril 2 times a day. Use in each nostril as directed, Disp: , Rfl:     cetirizine (ZyrTEC) 10 mg chewable tablet, Chew once daily., Disp: , Rfl:     cholecalciferol (Vitamin D3) 1,250 mcg (50,000 unit) tablet, Take 1 tablet (50,000 Units) by mouth every 7 days., Disp: 12 tablet, Rfl: 0    cyclobenzaprine (Flexeril) 10 mg tablet, Take 1 tablet (10 mg) by mouth 3 times a day as needed for muscle spasms., Disp: , Rfl:     escitalopram (Lexapro) 10 mg tablet, TAKE 1 TABLET BY MOUTH EVERY DAY, Disp: 90 tablet, Rfl: 0    fluticasone (Flonase) 50 mcg/actuation nasal spray, Administer 1 spray into each nostril once daily. Shake gently. Before first use, prime pump. After use, clean tip and replace cap., Disp: 16 g, Rfl: 5    ibuprofen 800 mg tablet, Take 1 tablet (800 mg) by mouth every 6 hours if needed for headaches, mild pain (1 - 3) or moderate pain (4 - 6)., Disp: , Rfl:     levothyroxine (Synthroid, Levoxyl) 50 mcg tablet, Take 1 tablet (50 mcg) by mouth once daily., Disp: , Rfl:     lisinopril 20 mg tablet, Take 1 tablet (20 mg) by mouth once daily., Disp: , Rfl:     omeprazole (PriLOSEC) 40 mg DR capsule, Take 1 capsule (40 mg) by mouth once daily in the morning. Take before meals. Do not crush or chew., Disp: 90 capsule, Rfl: 3    propranolol (Inderal) 10 mg tablet, Take 1 tablet (10 mg) by mouth 3 times a day., Disp: 270 tablet, Rfl: 3    Vitals:  Visit Vitals  Temp 36 °C (96.8 °F)   Ht 1.753 m (5' 9\")   Wt 114 kg (251 lb 8 oz)   BMI " 37.14 kg/m²   Smoking Status Former   BSA 2.36 m²      Physical Exam:  CONSTITUTIONAL:  Vitals reviewed in nursing chart, well developed, well nourished.    RESPIRATION:  Breathing comfortably, no stridor.  CV:  No clubbing/cyanosis/edema in hands.  EYES:  EOM Intact, sclera normal.  NEURO:  Alert and oriented times 3, Cranial nerves 2-12 intact and symmetric bilaterally.  HEAD AND FACE:  Skin with no masses or lesions, sinuses nontender to palpation.  SALIVARY GLANDS:  Parotid and submandibular glands normal bilaterally.  EARS:  Normal external ears, external auditory canals, and TMs to otoscopy, normal hearing to whispered voice.  NOSE:  External nose midline, anterior rhinoscopy is normal with limited visualization to the anterior aspect of the interior turbinates (see nasal endoscopy).  ORAL CAVITY/OROPHARYNX/LIPS:  Normal mucous membranes, normal floor of mouth/tongue/OP, no masses or lesions are noted.  PHARYNGEAL WALLS AND NASOPHARYNX:  No masses noted.  NECK/LYMPH:  No LAD, no thyroid masses.  LARYNX:  Could not directly visualize transorally.    SINONASAL ENDOSCOPY (CPT 60952): To better evaluate the patient's symptoms, sinonasal endoscopy is indicated.  After discussion of risks and benefits, and topical decongestion and anesthesia,an endoscope was used to perform nasal endoscopy on each side.  A time out identifying the patient, the procedure, the location of the procedure and any concerns was performed prior to beginning the procedure.    Findings:  Examination of the right nasal cavity revealed a deviated septum to that side.  Middle meatus and sphenoethmoid recess were normal without pus or polyps.  Examination of the left nasal cavity revealed mucopurulence at the middle meatus.  The sphenoethmoid recess was normal.    Results/Data:  I personally reviewed the CT scan dated 5/7/24 with the patient today in clinic. It demonstrated significant inflammatory changes within the left paranasal sinuses  "particularly the left maxillary, ethmoid, and frontal.  There is a periapical lucency in a left upper molar.  The right-sided paranasal sinuses were clear.  There is a small lesion on the floor of the right maxillary sinus consistent with a mucous retention cyst or polyp.    Provider Impressions:  1. Left chronic sinusitis; odontogenic sinusitis  2. Posterior nasal drainage  3. Nasal airway obstruction, deviated nasal septum to the right  4. Facial pain / pressure, migraine   5. Throat clearing, coughing, dysphonia   6. Nasal bleeding   7. ?  laryngopharyngeal reflux, on omeprazole trial   8. Phantosmia   9. Corona virus x 4, long haul COVID     Discussion:  Rajani Sabillonhelenon \"Rachana\" and I discussed her ongoing symptoms and CT sinus.  Based on her imaging, I think that a left upper tooth has a periapical lucency driving the infection that I saw on examination today.  I recommended evaluation with her dentist for this and I would like to see her after the tooth has been addressed to discuss next steps.  It is very difficult to know how long this infection has been present as she does not have imaging of her sinuses for the last 10 years.  She feels that her symptoms are worsening recently so I prescribed her a 10-day course of Augmentin and I recommended discontinuation for any side effects.    In regard to next steps, a lot of this will depend on whether or not the infection resolves with addressing the tooth.  In some individuals, the dental infection can create a situation where the sinus infection can only be resolved with endoscopic sinus surgery.  In other individuals, addressing the tooth with or without additional antibiotic therapy can resolve the issue.    I would like to see her back in clinic about 6 weeks after the dentition has been addressed.  At that time, we can discuss medical therapies for her baseline symptoms.  All questions were answered.    Patient Discussion/Summary:  Welcome to Dr. Rosales " Jose Manuel's clinic. We are here to assist you with your ENT needs at [facility] [place]. Dr. Richard is an ENT that specializes in nose, sinus, and skull base disorders.    Dr. Bobby Richard's office number is [phone]. Please call this number to contact his care team regardless of which office you use to access care. This number is the most direct way to communicate with all the members of the care team.    Gwen Rizo CNP is a nurse practitioner who is a part of Dr. Richard's team. She will work collaboratively with Dr. Richard to meet your goals. This often may include seeing you for more urgent appointments or follow-up visits under Dr. Richard's supervision.    Jerrica Agarwal RN BSN is Dr. Richard's primary nurse. She can be reached by calling [phone]. Jerrica is available during business hours Tuesday through Friday. Gwen GLEZN is her rhinology nurse partner. Gwen is available during business hours Monday through Thursday. Messages left for them will be returned within one business day. Jerrica is also Dr. Richard's surgery scheduler and will assist you with planning and scheduling of your surgery during her office hours.     Andree Moore is Dr. Richard's  and you can reach her at [phone]. She can help you with scheduling of appointments, general questions and information. She is available to receive calls Monday through Friday from 8:00 am until 4:25 pm.     For your convenience, Dr. Richard sees patients at [facility] and [facility]. While we try to make your appointments as convenient as possible, occasionally a visit to another location may be necessary to provide the best care for you.    Dr. Richard makes every effort to run on time for your appointments. Therefore, if you are more than 25 minutes late, your appointment will need to be rescheduled to another day. We appreciate your  understanding.     We look forward to working with you to meet your healthcare goals.     Signature:  Scribe Attestation  By signing my name below, I, Emam Mora   attest that this documentation has been prepared under the direction and in the presence of Bobby Richard MD.

## 2024-05-29 ENCOUNTER — OFFICE VISIT (OUTPATIENT)
Dept: GENETICS | Facility: CLINIC | Age: 39
End: 2024-05-29
Payer: COMMERCIAL

## 2024-05-29 VITALS
BODY MASS INDEX: 37.84 KG/M2 | WEIGHT: 255.5 LBS | HEIGHT: 69 IN | HEART RATE: 73 BPM | DIASTOLIC BLOOD PRESSURE: 86 MMHG | TEMPERATURE: 97.5 F | SYSTOLIC BLOOD PRESSURE: 136 MMHG

## 2024-05-29 DIAGNOSIS — Z80.3 FAMILY HISTORY OF BREAST CANCER: ICD-10-CM

## 2024-05-29 DIAGNOSIS — M24.80 GENERALIZED HYPERMOBILITY OF JOINTS: Primary | ICD-10-CM

## 2024-05-29 DIAGNOSIS — H91.90 DECREASED HEARING, UNSPECIFIED LATERALITY: ICD-10-CM

## 2024-05-29 DIAGNOSIS — G90.A POTS (POSTURAL ORTHOSTATIC TACHYCARDIA SYNDROME): ICD-10-CM

## 2024-05-29 PROCEDURE — 99417 PROLNG OP E/M EACH 15 MIN: CPT | Performed by: INTERNAL MEDICINE

## 2024-05-29 PROCEDURE — 99205 OFFICE O/P NEW HI 60 MIN: CPT | Performed by: INTERNAL MEDICINE

## 2024-05-29 PROCEDURE — 1036F TOBACCO NON-USER: CPT | Performed by: INTERNAL MEDICINE

## 2024-05-29 PROCEDURE — 3079F DIAST BP 80-89 MM HG: CPT | Performed by: INTERNAL MEDICINE

## 2024-05-29 PROCEDURE — 3075F SYST BP GE 130 - 139MM HG: CPT | Performed by: INTERNAL MEDICINE

## 2024-05-29 ASSESSMENT — ENCOUNTER SYMPTOMS
DEPRESSION: 1
OCCASIONAL FEELINGS OF UNSTEADINESS: 0
LOSS OF SENSATION IN FEET: 0

## 2024-05-29 ASSESSMENT — PATIENT HEALTH QUESTIONNAIRE - PHQ9
2. FEELING DOWN, DEPRESSED OR HOPELESS: SEVERAL DAYS
SUM OF ALL RESPONSES TO PHQ9 QUESTIONS 1 AND 2: 1
1. LITTLE INTEREST OR PLEASURE IN DOING THINGS: NOT AT ALL

## 2024-05-29 NOTE — LETTER
05/29/24    CRISTIN Bautista-CNP  [address]  [place] [facility]  [place] [address]      Dear HEIDI Sagastume,    Thank you for referring your patient, Rachana Morton, to receive care in my office. I have enclosed a summary of the care provided to Rachana on 05/29/24.    Please contact me with any questions you may have regarding the visit.    Sincerely,         Roscoe Pino MD  [address] [facility] [address]  [facility] [id number]    CC: No Recipients Coronavirus/COVID19

## 2024-05-29 NOTE — PROGRESS NOTES
MEDICAL GENETICS INITIAL VISIT NOTE     Patient full name: Rajani Morton  MRN: [id number]  YOB: 1985   Present during this visit: The patient and sister, oZila     Primary care provider: Faustina Soria PA-C  Referring provider: HEIDI Bautista ([facility])    Medical history was obtained from the patient. Prior to this appointment, I reviewed medical records from outpatient medical records and scanned documents, if available.     History of present illness:    Dear  HEIDI Bautista:    It was a pleasure to see Ms. Morton in clinic today. Ms. Morton is a 38 y.o. female who was referred to Genetics for evaluation of inherited connective tissue disorders. Ms. Morton has chronic longstanding musculoskeletal issues as outlined below. Pertinent negative and positive history related to inherited connective tissue disorders are as followings:     Musculoskeletal  - Hypermobility: Yes  - Joint laxity: Yes  - Joint dislocation: No  - Chronic widespread musculoskeletal pain: Yes  - Tendon/muscle rupture:  No  - Fractures: Yes   x2 ankle and wrist from injury.   - Scoliosis: No  - Pectus differences: No  - Flat feet: No  - Has not seen Rheumatology     Skin  - Spontaneous skin separation: ? Not typical for cEDS.  - Easy bruising: Yes  - Skin hyperextensibility: Yes  - Stretch marks not related to weight change: Yes  - Abnormal scar formation, atrophic scar, wound dehiscence: Yes   slow wound healing  - Started having varicose veins at legs in teenage years.      Dental  - Dental crowding: No  - Gingival disease: No     Cardiopulmonary:  - Orthostatic intolerance: Yes   dx with POTS. Seeing Dr. Davis 6/13/2024.  - Pneumothorax: No  - Echocardiogram: Yes, most recent 11/2023    no aortic root dilatation. Borderline pulmonary hypertension is noted.   - Spontaneous rupture/aneurysm/dissection of blood vessels: No    GI/  - IBS-like symptoms: Yes  IBS-C, seeing GI.   - Hernia:  "No  - Rectal prolapse: No  - Pelvic/uterine prolapse: No reports pelvic floor dysfunction. She cannot hold urine for long. Not seeing a provider for this.   - Spontaneous rupture of internal organs: No  - Obstetric complications (e.g., significant vaginal tear, or postpartum hemorrhage): miscarriages x2  - She is s/p hysterectomy due to endometriosis. Has \"1.5 ovaries\" left. She has PCOS.     Ocular/ENT  - Lens dislocation:  No  - High myopia:  No  - Hearing loss:  ? Reports reduced hearing, not seen by Audiology.     Neuropsychiatric:  - Migraines/headaches:  Yes  - Neuropathy:  Yes  - She has ADHD.     Pediatric history: No congenital hip dislocation, congenital club feet, hypotonia, developmental delay, autism.    Other medical issues include hypothyroidism, HTN. She had nephrotic syndrome around 16-18 years of age, s/p kidney biopsy, getting steroids and \"oral chemo\". 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 The cause of nephrotic syndrome is reportedly idiopathic. It has not recurred. She is not seeing Nephrology.     Social history:  Works in an [employer] industry.     Family history:  Three-generation family tree was obtained and scanned to chart, under \"Genetics\" folder.  Pertinent history   Dad, metastatic prostate cancer. He also has brain aneurysm.   Pat aunt (d) metastatic breast ca at 37.  Mom has ?aortic aneurysm. Record is not available.   Mat uncle started using hearing aids late 40.  MGM, melanoma, Afib  MGF, strokes (hemorrhagic), teeth issues, coronary blockage.     Mom, sister, mat aunt, female first cousin have similar musculoskeletal issues including joint hypermobility.      No other family members with ectopia lentis, rupture of internal organs, early deafness/blindness, or spontaneous pneumothorax.     [employer]ist: Denied     Physical examination:  Arm span to height ratio: 173:175 ~1  Upper segment to lower segment ratio: 85:90 ~1  GENERAL: The patient is alert, in no " apparent distress.  Head shape is normal.  Face: Malar hypoplasia+. Forehead, nose, philthrum, and chin appear normal.  Eyes: Not deep set. Downslanting palpebral fissures+. Sclerae not blue or icteric. PERRLA. No iridodonesis.  Ears: Ear length 5.3cm (less than 2 SD). Not dysplastic, posteriorly rotated, or low set.  Oral cavity: No high arched palate. No dental crowding. Normal uvula, normal dentition, no receding gum line or gingivitis.  CHEST/LUNGS: Pectus deferred.   EXTREMITIES/Back: No arachnodactyly. Wrist signs negative. Thumb signs negative. Bilateral piezogenic papules+. Acrogeria-. No joint contractures. No pretibial plaque. Hindfoot deformity-, flat feet-, scoliosis- by forward bending test. No peripheral edema. Varicose veins+ at legs.   SKIN: Stretch marks deferred. No skin hyperextensibility, bruising+, soft skin consistency+. Veins are visible at arms. Scar not seen.   NEUROLOGIC: EOMI without nystagmus. No ptosis. No facial palsy. Tongue in midline. No dysarthria. Normal gait without abnormal movement.   Psychiatric: Cooperative. Appropriate mood and affect.     Beighton score for generalized joint hypermobility  1. Passive dorsiflexion and hyperextension of the fifth MCP joint beyond 90°: 2  2. Passive apposition of the thumb to the flexor aspect of the forearm: 2  3. Passive hyperextension of the elbow beyond 10°:  0  4. Passive hyperextension of the knee beyond 10°:  2  5. Active forward flexion of the trunk with the knees fully extended so that the palms of the hands rest flat on the floor:  0  Total  6/9 (where a score of equal to or more than 5 is considered positive for age     Systemic score for Marfan syndrome is  1 (striae) where a score of equal to or more than 7 is considered positive systemic score.     Results:    Echocardiogram 11/2023  PHYSICIAN INTERPRETATION:  Left Ventricle: Left ventricular systolic function is mildly decreased, with an estimated ejection fraction of 50%. There  are no regional wall motion abnormalities. The left ventricular cavity size is normal. Spectral Doppler shows a normal pattern of left ventricular diastolic filling. Endocardial walls not well-visualized in all views. Accurate ejection fraction unable to be obtained in light of this. Most views would suggest EF around 50% there may be mild hypokinesis in the anterior lateral wall but this is very poorly visualized. Would consider either echo contrast or cardiac MRI to better define EF and LV function  Diastolic function is normal at this time.  Left Atrium: The left atrium is normal in size.  Right Ventricle: The right ventricle is normal in size. There is normal right ventricular global systolic function. Normal RV size and function  Borderline pulmonary hypertension RVSP 33-35 mmHg.  Right Atrium: The right atrium is normal in size.  Aortic Valve: The aortic valve is trileaflet. There is no evidence of aortic valve regurgitation. The peak instantaneous gradient of the aortic valve is 8.9 mmHg. The mean gradient of the aortic valve is 6.0 mmHg.  Mitral Valve: The mitral valve is normal in structure. There is no evidence of mitral valve regurgitation.  Tricuspid Valve: The tricuspid valve is structurally normal. There is mild tricuspid regurgitation.  Pulmonic Valve: The pulmonic valve is structurally normal. There is no indication of pulmonic valve regurgitation.  Pericardium: There is no pericardial effusion noted.  Aorta: The aortic root is normal.     CONCLUSIONS:   1. Left ventricular systolic function is mildly decreased with a 50% estimated ejection fraction.   2. Endocardial walls not well-visualized in all views. Accurate ejection fraction unable to be obtained in light of this. Most views would suggest EF around 50% there may be mild hypokinesis in the anterior lateral wall but this is very poorly visualized. Would consider either echo contrast or cardiac MRI to better define EF and LV function       "Diastolic function is normal at this time.   3. Normal RV size and function      Borderline pulmonary hypertension RVSP 33-35 mmHg.   4. Normal valve structure and function.     Assessment:  Ms. Morton is a 38 y.o. female with phenotypes as outlined in the HPI and PE sections. Personal history is notable for reported history of reduced hearing. Family history is notable for ?thoracic aorticaneurysm in mother, brain aneurysm in father, and maternal uncle using hearing aids in late 40s. Echocardiogram was without aortic root dilatation; it was obtained 11/2023.     The constellation of signs and symptoms is suggestive of inherited connective tissue disorders, which is a group of genetic disorders affecting joints, skin, eye, blood vessels, particularly aorta.     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 HSD is a diagnosis when an individual does not fulfill the strict 2017 diagnostic criteria for hypermobile Claudio-Danlos syndrome (hEDS) and does not have clinical (or molecular) features suggestive of other hereditary connective tissue disorders. Ms. Morton does not fulfill the strict diagnostic criteria for hEDS (scanned in chart). The management of HSD and hEDS is similar and individuals with HSD may later fulfill the hEDS criteria. HSD/hEDS is the most common inherited connective tissue disorder and is the only subtype among 13 EDS subtypes that does not have a testable gene.     I recommend that she have a hearing test. Her maternal uncle started using hearing aids in late 40s. 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 If hearing test is confirmed, Stickler syndrome should be ruled out.     I recommend that she provide me with the report of her mom's reported history of \"aortic aneurysm\". If thoracic aortic aneurysm in her mom is present, her mom should see a .     After hearing test and my reviewing her " "mom's imaging studies, we could proceed with genetic testing (EDS genes +/- Stickler syndrome genes +/- aortopathy genes).     We could also include genes for nephrotic syndrome, given that she had \"idiopathic\" nephrotic syndrome. If a genetic cause is identified, it would provide additional recommendations for her and family members. There is a possibility of more uncertain findings. Ms. Morton would like to proceed with testing of the kidney genes.     Benefits of having genetic test include 1) to establish a molecular diagnosis; 2) to provide further medical recommendations specific to each diagnosis; 3) for familial cascade testing, recurrence risk estimation, and family planning; and 4) to allow for participation in support group organizations and enrolment in clinical trials, if any.  Pretest genetic counseling was provided, including the nature of the test; three types of test result (positive, negative, and uncertain); the fact that a negative result does not exclude a possibility of genetic disorders; and retention of de-identified genetic data at the testing company. The patient provided a verbal informed consent.     Of note, I recommend that her father undergo genetic evaluation. He meets genetic testing criteria per the NCCN guidelines given personal history of metastatic prostate cancer and family history (his sister) of early-onset breast cancer. He may have BRCA2 mutation. Identification of the genetic variant would allow for management recommendations for him and cascade testing in other family members.     Plan:  1) In the meantime, I recommend that she see PM&R for management of symptomatic joint hypermobility. A referral was placed.   2) A referral to Audiology placed.   3) Ms. Morton to provide me with her mother's report of the imaging studies that showed \"aortic aneurysm\" for my review.    4) After 2) and 3), consider [facility] panels for EDS +/- Stickler syndrome +/- aortopathy genes and for " nephrotic syndrome genes. TAT 4 weeks. Sample: buccal swab/saliva kit to be mailed to address later. Insurance billing. Ms. Morton is aware of a potential self-pay option of $250.  5) Father to see Genetics for cancer.     Return to clinic when results are available.    Thank you for allowing me to participate in the care of this patient. Please do not hesitate to contact me if you have any questions.   Sincerely,     Roscoe Pino MD    Medical Geneticist  [facility]  Phone: [phone]  Fax: [phone]   Address: [address] [place] [address]  Time spent (this information is required by insurance): face-to-face 77min (10.12am-11.29am); preparation 5min; documentation 30min; total time spent 112min

## 2024-05-29 NOTE — LETTER
05/29/24    Faustina Soria PA-C  [address] [facility] [address] [facility] [address] [facility] [address]  [facility] [address]      Dear Dr. Faustina Soria PA-C,    I am writing to confirm that your patient, Rachana Morton  received care in my office on 05/29/24. I have enclosed a summary of the care provided to Rachana for your reference.    Please contact me with any questions you may have regarding the visit.    Sincerely,         Roscoe Pino MD  [address] [facility] [address]  [facility] [id number]    CC: No Recipients

## 2024-05-31 ENCOUNTER — TELEMEDICINE (OUTPATIENT)
Dept: OTOLARYNGOLOGY | Facility: CLINIC | Age: 39
End: 2024-05-31
Payer: COMMERCIAL

## 2024-05-31 DIAGNOSIS — J32.8 OTHER CHRONIC SINUSITIS: ICD-10-CM

## 2024-05-31 DIAGNOSIS — H93.13 TINNITUS OF BOTH EARS: Primary | ICD-10-CM

## 2024-05-31 NOTE — PROGRESS NOTES
"Patient ID: Rajani Morton \"Rachana\" is a 38 y.o. female who presents for a virtual video follow-up visit.     PROVIDER IMPRESSIONS:  DIAGNOSES/PROBLEMS:  -Tinnitus, bilateral   -Left chronic sinusitis/odontogenic sinusitis      ASSESSMENT:   Rajani Morton is a pleasant 38 y.o. female who presents for a virtual follow-up visit to discuss treatment outcomes for LPR. Given that patient symptoms of facial pain/pressure, phantosmia, postnasal drip, throat clearing, coughing, dysphonia, and nasal airway obstruction remain ongoing, I explained to patient that ongoing medical management for LPR will be discontinued and management of chronic sinusitis symptoms will be managed with Dr. Richard. For other presenting symptoms of bilateral non-pulsatile tinnitus, I informed her that audiogram evaluation with follow-up is recommended.     PLAN:  I recommended discontinuation of p.o. omeprazole 40 mg daily. Medication discontinued in patient AEMR.   I recommended ongoing patient follow-up with my rhinology physician partner Dr. Bobby Richard for recent CT sinus imaging results and sinonasal symptom management of chronic sinusitis/odontogenic sinusitis.  Follow-up: Patient may schedule for follow-up with audiogram for further evaluation of tinnitus in the next 1-4 weeks. Patient is agreeable to this plan, all questions were answered to patient's satisfaction.     At today's virtual visit with Rajani Morton , the number of minutes I spent providing patient care was 20. More than 50% of that time was spent counseling the patient on possible etiologies, test results, treatment options and care coordination.    Subjective   HPI: Rajani Morton \"Rachana\" is a 38 y.o. female with a history of long-haul COVID who presents virtually for the follow-up evaluation to discuss treatment outcomes for LPR. Since last visit on 3/21/24, the patient reports no change in symptoms of cough, throat clearing, smell distortion, and " postnasal drip. Patient recently seen by my rhinology physician partner Dr. Bobby Richard on 5/16/24 for CT sinus imaging results and was prescribed course of p.o. augmentin, she will continue to follow with him for sinonasal symptom management. Patient mentions symptoms of bilateral non-pulsatile tinnitus that has been continuous.     RECALL 3/21/24:   HPI: Rajani Morton is a 38 y.o. female who presents for the evaluation of persistent facial pain/pressure, cough, and postnasal drip. Reports history of COVID-19 infection x4 from [id number] and most recently tested positive 6 months ago in September 2023. States that symptoms have remained ongoing since last COVID-19 infection and associated with recurrent sinus infections. In the last 12 months, the patient estimates the number of medically treated sinus infections to be 6-7 infections. The most recent sinus infection was a few months ago. Reports that facial tenderness/pressure is located more toward the right cheek/forehead and associated with headache-like pressure. Reports that cough is non-producive/dry but is incited by sensation of posterior nasal drainage. Mentions she has experienced smell distortion which she attributes to COVID-19 infections, states she continuously notices phantom smells of cigarette smoke as well as taste distortion which remains unchanged. Denies anterior nasal drainage, nasal airway obstruction, sneezing, itching eyes, hoarseness and nasal bleeding. Reports recent increase in heartburn/reflux since last COVID-19 infection and has been using o.t.c. omeprazole 20 mg daily for symptoms. When asked about medications used for sinonasal symptoms, the patient reports current use of flonase with consistent use for the past 3 months. Reports she has used Azelastine nasal spray consistently for the past 1.5 years with noticeably decreased symptom benefit since last COVID-19 infection in September 2023. The patient reports prior use of  netti-pot saline irrigations. When asked about a past medical history of asthma, aspirin sensitivity, migraines, allergies, nasal fracture/trauma, the patient admits to seasonal allergies. States she may experience migraines but has not been confirmed/followed by neurology. The patient denies prior allergy/immunology testing. Patient denies prior sinonasal surgery and denies sinonasal imaging within the past 5 years. Other past medical history includes h/o PASC, hypothyroidism, endometriosis, fibromyalgia, POTS, HTN, migraines, obesity, and IBS.   ASSESSMENT:   Rajani Morton is a pleasant 38 y.o. female with a history of post-acute sequelae of COVID-19 (PASC) referred by provider from  [facility] for persistent facial pressure/pain, postnasal drip, and chronic nonproductive cough. Associated symptoms include headache and dysosmia secondary to PASC. Nasal endoscopy was performed today and findings detailed in the procedure note below, which revealed mucosa erythematous and swollen with bilateral nasal turbinate hypertrophy. The septum was deviated right. Endoscopy with brief view of larynx that showed hyperemic/edematous mucosa, pyriform sinus mucus accumulation, BOT lingual tonsil hypertrophy. I discussed the findings of the patient and offered reassurance and counseling. No overt evidence of TVC lesions/weakness and no sign of purulent nasal drainage, nasal polyps, nasal lesions/masses, or nasal bleeding. I discussed the findings of the patient and offered reassurance and counseling. Based on the clinical information provided, symptoms and clinical exam findings are most consistent with LPR vs. Recurrent/chronic rhinosinusitis.  I explained to patient that management of LPR with PPI therapy trial for reflux lifestyle modifications may reduce nasopharyngeal inflammation due to underlying heartburn/reflux etiology and aid in healing mucosal lining. I explained that given the patient's history of  recurrent acute sinusitis over the past 12 months (treated 6-7 times), providing maximum medical therapy with empiric antibiotic therapy for sinusitis and subsequent CT sinus imaging to evaluate for sinonasal pathology is the most reasonable approach to determine further recommendations.  PLAN  I recommended continuing nasal steroid spray  fluticasone (Flonase). I recommended either 2 puffs into each nostril daily, or 1 puff into each nostril twice daily for a consistent trial of at least 4-6 weeks. Patient counseled that this medication is a topical intranasal steroid nasal spray indicated to reduce nasal inflammation. Patient was educated on proper administration of nasal spray, by tilting their head down and pointing the applicator tip towards the lateral wall of the nose/corner of the eye on the same side. I advised patient to avoid pointing applicator toward the septum due to the risk of nasal bleeding.  Patient informed to discontinue the spray if they experience adverse side effects. This medication may be purchased over the counter; a prescription may be sent to the patient's pharmacy upon request.  I recommend continuing use of Azelastine nasal spray: 2 sprays each nostril once a day for better topical allergy control. This medication may be purchased over the counter; a prescription may be sent to the patient's pharmacy upon request.  I recommend the patient restarts daily, consistent intranasal saline irrigations for nasal symptoms. I educated the patient that saline irrigations aid in flushing out residual mucus, crusts, allergens or other environmental irritants in the nasal cavity. I emphasized that this will help clean the nasal cavity PRIOR to use of the medicated intranasal spray. I counseled the patient on appropriate administration of saline irrigations. I informed the patient that saline irrigation kits may be purchased over the counter.   I recommended an oral antibiotic course of p.o.  Doxycycline 100 mg twice daily (every 12 hours) for 14 days. The patient was advised to take sun precautions while on medication. I informed the patient to notify us if they experience abdominal bloating and diarrhea as we may likely change the prescribed medication. I electronically sent the prescription order to the patient's preferred pharmacy on file.   I recommended CT sinus volumetric without contrast for further pathological-anatomical evaluation of the paranasal sinuses. I counseled patient that imaging should be performed shortly after completing a course of oral antibiotics. I also educated patient to continue with recommended maximum medical therapy with topical sprays, irrigations, etc. Patient was provided the requisition order and instructed to contact radiology services to schedule imaging. Patient reassured that I will personally review CT imaging, pending results. Reassurance provided to patient that findings will be discussed at follow-up visit with my office and/or with the referred rhinology physician determine further recommendation.   I counseled the patient on dietary and lifestyle modifications to improve LPR. I recommended limiting or avoiding consumption of spicy, fatty, caffeinated, carbonated, acidic, and citrus foods or beverages that may aggravate reflux. I also recommended limiting or avoiding consumption of chocolate, peppermint, and alcohol.  I recommended eating smaller, more frequent meals and to avoid eating or drinking 2-3 hours before lying down. The patient was also apprised that elevating the head of the bed may help. The patient was counseled on avoiding NSAIDS as much as possible, and if taking them be sure to do so with a meal.  I recommended starting trial of PPI therapy for LPR. Omeprazole 40mg was prescribed once daily to be taken 30 minutes before meals. I discussed with the patient that this medication can limit gastric acid and subsequently allow for adequate mucosal  healing. Patient was informed that it may take up to 3-6 months to notice any therapeutic effect.  I recommended referral to neurology for evaluation and management of possible migraines. Referral e-submitted and patient instructed to call and schedule.   Follow-up: Patient may schedule for virtual follow-up in 8-12 weeks to review CT sinus results and evaluate treatment outcomes. They may follow-up sooner, if needed. Patient is agreeable to this plan, all questions were answered to patient's satisfaction.       PATIENT HISTORY:  Past Medical History:   Diagnosis Date    Allergic     Avascular necrosis of bone (Multi) 2019    both hips, from steroids    Disease of thyroid gland     Fibromyalgia     Hypertension     POTS (postural orthostatic tachycardia syndrome)       Past Surgical History:   Procedure Laterality Date    HYSTERECTOMY      1 ovary left    LABYRINTHECTOMY      OTHER SURGICAL HISTORY  01/07/2020    Laparoscopic hysterectomy    RENAL BIOPSY        Allergies   Allergen Reactions    Morphine Nausea/vomiting        Current Outpatient Medications:     azelastine (Astelin) 137 mcg (0.1 %) nasal spray, Administer 1 spray into each nostril 2 times a day. Use in each nostril as directed, Disp: , Rfl:     cetirizine (ZyrTEC) 10 mg chewable tablet, Chew once daily., Disp: , Rfl:     cyclobenzaprine (Flexeril) 10 mg tablet, Take 1 tablet (10 mg) by mouth 3 times a day as needed for muscle spasms., Disp: , Rfl:     escitalopram (Lexapro) 10 mg tablet, TAKE 1 TABLET BY MOUTH EVERY DAY, Disp: 90 tablet, Rfl: 0    fluticasone (Flonase) 50 mcg/actuation nasal spray, Administer 1 spray into each nostril once daily. Shake gently. Before first use, prime pump. After use, clean tip and replace cap., Disp: 16 g, Rfl: 5    ibuprofen 800 mg tablet, Take 1 tablet (800 mg) by mouth every 6 hours if needed for headaches, mild pain (1 - 3) or moderate pain (4 - 6)., Disp: , Rfl:     levothyroxine (Synthroid, Levoxyl) 50 mcg  tablet, Take 1 tablet (50 mcg) by mouth once daily., Disp: , Rfl:     lisinopril 20 mg tablet, Take 1 tablet (20 mg) by mouth once daily., Disp: , Rfl:     omeprazole (PriLOSEC) 40 mg DR capsule, Take 1 capsule (40 mg) by mouth once daily in the morning. Take before meals. Do not crush or chew., Disp: 90 capsule, Rfl: 3    propranolol (Inderal) 10 mg tablet, Take 1 tablet (10 mg) by mouth 3 times a day., Disp: 270 tablet, Rfl: 3   Tobacco Use: Medium Risk (5/29/2024)    Patient History     Smoking Tobacco Use: Former     Smokeless Tobacco Use: Never     Passive Exposure: Not on file      Alcohol Use: Not At Risk (2/10/2024)    AUDIT-C     Frequency of Alcohol Consumption: Monthly or less     Average Number of Drinks: Patient does not drink     Frequency of Binge Drinking: Never      Social History     Substance and Sexual Activity   Drug Use Yes    Types: Marijuana        Review of Systems   All other systems negative.     Objective   TELEHEALTH PHYSICAL EXAM:  CONSTITUTIONAL: No acute distress, normal facial features; No fever; no chills  VOICE: No hoarseness or other audible abnormality  RESPIRATION: Breathing comfortably, no stridor; normal breathing effort  CV: No cyanosis visible on the face and neck area  EYES: Pupils equal and round; no erythema; conjunctiva clear; sclera white  NEURO: Alert and oriented, able to raise eyebrows symmetrical bilateral, smile with no facial droop, able to swallow  HEAD AND FACE: Symmetric facial features, no masses or lesions Right ear examination: External ear normal. Left ear examination: External ear normal.  NOSE: External nose midline  ORAL CAVITY: No lesions of external lips; uvula is midline; tongue with good mobility; no gross mass in oral cavity; mucosa appears pink  NECK/LYMPH: No obvious deformity or lesions; trachea is midline   PSYCH: Alert and oriented with appropriate mood and affect      Patricia Salgado, APRN-CNP

## 2024-06-04 ENCOUNTER — APPOINTMENT (OUTPATIENT)
Dept: CARDIAC REHAB | Facility: HOSPITAL | Age: 39
End: 2024-06-04
Payer: COMMERCIAL

## 2024-06-10 NOTE — PROGRESS NOTES
Patient canceled her scheduled evaluation in the autonomic clinic with Dr. Davis, below note is incomplete:    Subjective   COVID-19 Infection Date:  12/14/2020 confirmed via rapid home antigen test (sx: Fever, Fatigue, Loss or disturbed smell, Headache, Sore throat, Cough, Pain on breathing, Loss or disturbed taste, Muscle pain, Sinus pressure, Shortness of breath, Chest pain, Runny nose, Abdominal pain  - no hospitalization, treated with Augmentin and prednisone)  December 2021 confimed via rapid home antigen test (sx: Fever, Cough, Runny nose, Sore throat, Sinus Pressure - no hospitalization, treated with Augmentin and Prednisone for sinusitis)  Spring 2022 confirmed via rapid home antigen test (sx: Fever, Cough, Runny nose, Sore throat, Sinus Pressure  - no hospitalization, treated with Augmentin and prednisone for sinusitis)  9/20/2023 confirmed via rapid home antigen test (sx: Fever, Cough, Shortness of breath, Fatigue, Pain on breathing, Chest pain, Loss or disturbed smell, Loss or disturbed taste, Runny nose, Headache, Muscle pain, Abdominal pain, Diarrhea, Brain fog, Sore throat, Sinus pressure  - no hospitalization, treated with Prednisone for sinusitis)    COVID-19 vaccine status: Pfizer-evolso 11/20/21, 12/11/21, 5/17/22     Occupation: full-time  for [employer] working remotely    Current Providers: PCP Faustina Soria, Cardiology Dr. Aleman, GI MICHAEL Dyson, ENT MICHAEL Salgado and Dr. Richard, Genetics Dr. Pino    Survey Scores: 01/2024  PHQ-9: 16    CHAITANYA-7: 6    Sleep Wellness: 9    FSS average: 6.56    Modified Ecog average: 2.17    MOCA: 21/22 (02/2024)  Overall Health: 50 -> 45     38 y.o. 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 Mostly improved now, still congested. Hard to tell what is illness vs LC symptoms.  Heart rate has improved with propranolol, BP has come down with this as well  Cardiology changed propranolol to three times daily, that has helped a lot  HR has ranged around from , generally balancing around 55 and 120  Still with HR increase when standing up, often has presyncopal episodes, but no more syncopal epidoses  Learning better on how to balance rest and activity  Biggest complaint is at night-time she has ngiht sweats that soak the bed  During the day also either very cold or very hot, gets physically ill when she goes out into the heat  When she gets overheated at all, she starts sweating, HR elevation, nausea  Lost significant weight a few years ago, lowest was 198lbs, now has gained weight back  Wants to lose weight, has many food aversions from COVID still, exercise has been difficult  With fibromyalgia and EDS is also worried about additional weight on her joints  Craving carbs, mostly tolerating meat, has to force herself to eat vegetables or fruit because they taste so poorly  Eggs and peanuts are very repulsive  Still having phantom smells, smelling cigarette smoke 75% of the time, beyond that cannot smell well  Saw ENT provider who diagnosed her with LPR, omeprazole has been increased but has not noted improvement  Has CT scan of sinus coming up, will be using saline rinses prior to CT, keeping up with nose sprays  Notes that some things are smelling correctly, at lower level  Breathing worsened when she was sick recently, O2 dropped to 80s a few times when moving around  Has CPET scheduled to further investigate, worried about being able to do it   Mentally doing much better with Lexapro, overwhelmed because she has so many doctor's appointments  Has appointment scheduled for next month  Will qualify for FMLA next  month  IBS-C history, for the past month her BM has been weird, has had accidents twice because stool just comes, going 4-5 times per day, has to push and it is very skinny pudding consistency  Brain fog has been very bad, affecting her work, learning new tasks is near impossible, turned down job advancement offer  Was diagnosed with ADHD as a young child, was never on medication, feels that her ADHD is worse  Notes that ADHD is not much worse, that appears to be hindering her more than some of her other physical problems  Has been prone to headaches her whole life, talked to ENT about that, was told it is chronic migraines when she was younger, believes that she is getting cluster headaches, steroids takes pain away pretty instantly  ENT referred her to headache specialist, that is scheduled in August, overall that has improved over the past year  A little bit of tremors, nothing that has changed up or down, parkinson on both sides of her family  Hands shake a little bit when she has a bad POTS day  Muscle cramps still occur everywhere, any part of her body, 24/7 pain in her left back/trunk, worst when sneezing  Concerned for EDS, mother, grandmother, sister, and aunt all have the same symptom  She is very mobile, skin is very stretchy, has stretch marks everywhere  Still getting chest pain, too, overall lessened, often still there in the morning or when she overdoes it  Was getting sick every time she ate and that has improved  Still coughing a lot at night, even with omeprazole  Numbness and tingling is around the same, feet have been worse lately  One of her toes is growing a second nail on top of it, was able to peel off, no discoloration or smell  Cannot feel anything when she touches her foot  Has had worsening pain in joints, most specifically in wrists and forearms, aching pain from hands half-way up her arm  Has muscle relaxer that she takes at night to help her sleep, otherwise cannot fall asleep for 2-3  hours  More tired in general, used to be a ollie, getting 8 hours of sleep but not refreshed  Will address tinnitus with ENT provider when she sees her next  Still getting rash on her face that looks like a lupus rash, that comes periodically over many years  Her whole body changed 3-4 years ago, cannot tolerate the sun anymore     02/2024:  Had COVID 4 times, each less less severe than the one prior until Septer which started her LC symptoms  Treated with antibiotics and prednisone for sinusitis with each episode  POTS diagnosis in the fall, dysautonomia symptoms have been bad, either ice cold or profusely sweating, HR fluctuating from   Was taking metoprolol prior to COVID, PCP started her on propranolol, cardiologist took her off all except lisinopril, BP has not been stable or consistent since COVID  Did a 24hr BP monitor and that showed that when sitting down and not moving around she had excellent readings, when up and moving then 140//110  POTS symptoms have worsened significantly since she was taken off metoprolol and propranolol  O2 will still drop to 80s when up and moving  Work-up was reassuring except for elevated BP, pulse was irregular  Has not had autonomic testing, cardiologist did orthostatics in the office  Has an appointment coming up with neurology for this  Difficulty tolerating showers, passed out, using shower chair  Often nauseated in the morning, no vomiting  IBS symptoms are a lot worse now, constipation and diarrhea, hx of IBS-C primarily  Following with GI, next appointment this Wednesday  Gallbladder attacks are more problematic  Hx of fibromyalgia, but a lot more leg pain since COVID, joint pain was already really bad there, feels like an isolated area in the muscles, 2-3 inch circles, moving around  Ibuprofen is not helpful  Hx of extensive pain  Normal swelling due to nephrotic syndrome when she was 17  Sometimes wears compression stockings but usually super high  compression leggings  Has been trying to add salt but hx of nephrotic symptoms is not used to any salt  Drinking element package (1g) per day and adding more salt to her food  Prior to COVID drank 120-150oz of water per day, less now because water makes her feel nauseated, around 80oz per day and feels dehydrated  Mentally taxed ema she has been feeling so miserable, struggling to get through the day  Has chronic health problems and was able to manage these better  Never had anxiety or depression before, now both of these  O2 is dropping when she is sleeping as well  Not sleeping well, exhausted all the time, nodding off when she sits, cannot fall asleep for 2-3 hours, typically stays asleep if nothing wakes her, when she is woken up then has difficulty getting back to sleep, average time asleep is 5-6 hours per day, naps on her day off  Snores, worse when having sinus problems  Not refreshed in the morning  Brain fog, has moments prior due to fibromyalgaia but now this is constant, has to keep notes, harder time comprehending, forgeting what she is doing walking into a room, slower learning than her typical at work  Got approved for ADA accommodations at work, can call off 3 days per month  Hard for her to make it through an 8hr day and 5 days per week  Headaches more often, at 2-3 times per week, ibuprofen or excedrine helps 40% of the time  75% on forehead, the rest at the base of her head, descrbes pain as stabbing and intense in the back of her head, dull and constant in her forehead  No vision changes, no hearing changes but has been having tinnitus which is new, may just be the left ear, intermittent, a little bit of ear discomfort, since COVID excessive amount of wax production  Smells sulphur and cigarette smoke often, taste has been messed up since first time she has had COVID, changes often, something that she lives today can taste bad tomorrow, peanut butter or eggs   No mouth sores, a bit of dry  mouth, sensitive eyes but no dry eyes  Face pain from sinus problems  Sinus problems, coughing, post-nasal drainage  Started Azelastine 1.5yrs ago, that has helped until the last COVID illness  Getting SOB, air hunger, was working out and losing weight before COVID  Now when going upstairs feels SOB, also when moving a lot such as getting ready for the day and she stops then gets hit with shortness of breath the most intense  Cough, worse when her O2 is lower or when really hot, dry cough  A lot of chest pain the past two week, new  High blood pressure a couple of times this weekend, center in her chest, comes and goes, worse when she eats but in different place than the gallbladder pain, sometimes wakes up with the pain, almost always worse when she eats, describes pain as sharp and stabbing  No acid reflux, took omeprazole for a while and it didn't help anything, stopped around a month ago after taking it straight for 2 months, next FUV with GI is on Wednesday  No sores  Getting butterfly rash often, gets redness on face or neck, unilateral, happens when she doesn't feel well  Butterfly rash has been happening intermittently for years  No abnormal bleeding or bruising, hx of bruising easily  No hypermobility  Numbness and tingling on left arm and hand intermittently, sometimes in right hand as well  Quickly getting numbness and tingling in legs when in a position for too long  Get charley horses in her whole body all the time, abdomen, back, side of back  Tremors hands are not holding things still, which is new  General muscle weakness with is new, opening a jar hurts her hand, often cannot do that    Relevant prior healthcare visits:  -05/2024 ENT stopped omeprazole for LPR, recommends follow up with Dr. Richard for chronic sinusitis/odontogenic sinusitis, audiogram ordered due to tinnitus  -05/2024 Genetics notes that constellation of signs and symptoms is suggestive fo inherited connective tissue disorder,  most possible diagnosis is hypermobility spectrum disorder. Plan to review hearing test and mother's imaging studies prior to proceeding with genetic testing (EDS genes +/- Stickler syndrome genes +/- aortopathy genes). Referred to PM&R  -05/2024 ENT Dr. Richard for left chronic sinusitis and odontogenic sinusitis, recommends dental evaluation  -03/2024 Cardiology Dr. Kam ordered cPET for PASC-related exercise intolerance, chest pain, tachycardia, [place]O  -02/2024 PCP prescribed Flonase and medrol dose pack for sinus complaint  -12/2023 ED for syncope in shower and foot injury  -10/2023 GI ordered fibroscan for fatty liver, fiber supplement and miralax for IBS-C, omeprazole and gallbladder US for RUQ abdominal pain, referred to cardiology for HTN and tachycardia  -11/2022 Sleep Medicine [facility] EDS, recommends follow-up with rheumatology as scheduled, PSG and MSLT    Relevant prior diagnostic studies:  -04/2024 HSAT shows very mild ED with O2 garret 83.1  -02/2024 autonomic testing consistent with POTS  -02/2024 CTA chest shows no PE and clear lungs  -12/2023 CXR with clear lungs  -11/2023 PFTs normal  -11/2023 FibroSCAN shows stage 0-1 disease and severe steatosis  -11/2023 echocardiogram with LVEF 50%, borderline pulmonary HTN with RVSP 33-35 mmHg, endocardial walls not well-visualized leading to inability to accurately calculation of EF with possible mild hypokinesis in the anterior lateral wall but this is very poorly visualized  -10/2023 US gallbladder unremarkable, prominent liver  -10/2023 CT abdomen and pelvis unremarkable   -03/2023 colonoscopy normal    Relevant prior laboratory values, unremarkable unless noted:  -05/2024 Vitamin B12 250, Vitamin D 38  -02/2024 GGT, cryoglobulin, urine porphobilinogen, urine metanephrines, urine organic acids with elevation in lactic acid, urine copper, UPEP, aldolase, catecholamines, carnitine, TPO ab, Dys-2, Vitamin E, Anti-thyroglobulin ab, T3, T4, anti-parietal cell  ab, paraneoplastic ab, mycoplasma pn IgM negative and IgG positive, MMA, homocysteine, serum copper, CoQ10, Mag, Vitamin D 13, Vitamin B12 302, CMP, CK, BNP, CCP, iron studies, TSH, ESR 34, RF, D-Dimer 840, CBC/D, HbA1c, Folate, Ferritin, CRP 1.28, JEET, am cortisol, Vitamin B6, Vitamin B1, Troponin, SPEP, Tryptase  -12/2023 Troponin, CMP, CBC/D  -10/2023 iron studies, Hepatitis panel, Anti-smooth muscle ab, anti-mitochondrial ab, [id number], Mag, Lipase, D-Dimer  705, Vitamin D, TSH  -10/2022 ferritin, T4, vitamin B12 377, JEET, CCP ab, sjogren ab, TPO ab, thyroglubin ab  -05/2022 HbA1c 4.8%, RF, CRP 1.3, ESR, uric acid    Exercise routine: none  Diet:  Weight hx: pre-COVID-19 230  lbs -> post-COVID-19 255  lbs -> 250 lbs  Substance use: former tobacco use, 1-2 servings ETOH monthly or less   Social:       Current Outpatient Medications:     azelastine (Astelin) 137 mcg (0.1 %) nasal spray, Administer 1 spray into each nostril 2 times a day. Use in each nostril as directed, Disp: , Rfl:     cetirizine (ZyrTEC) 10 mg chewable tablet, Chew once daily., Disp: , Rfl:     cyclobenzaprine (Flexeril) 10 mg tablet, Take 1 tablet (10 mg) by mouth 3 times a day as needed for muscle spasms., Disp: , Rfl:     escitalopram (Lexapro) 10 mg tablet, TAKE 1 TABLET BY MOUTH EVERY DAY, Disp: 90 tablet, Rfl: 0    fluticasone (Flonase) 50 mcg/actuation nasal spray, Administer 1 spray into each nostril once daily. Shake gently. Before first use, prime pump. After use, clean tip and replace cap., Disp: 16 g, Rfl: 5    ibuprofen 800 mg tablet, Take 1 tablet (800 mg) by mouth every 6 hours if needed for headaches, mild pain (1 - 3) or moderate pain (4 - 6)., Disp: , Rfl:     levothyroxine (Synthroid, Levoxyl) 50 mcg tablet, Take 1 tablet (50 mcg) by mouth once daily., Disp: , Rfl:     lisinopril 20 mg tablet, Take 1 tablet (20 mg) by mouth once daily., Disp: , Rfl:     propranolol (Inderal) 10 mg tablet, Take 1 tablet (10  mg) by mouth 3 times a day., Disp: 270 tablet, Rfl: 3    Past Medical History:   Diagnosis Date    Allergic     Avascular necrosis of bone (Multi) 2019    both hips, from steroids    Disease of thyroid gland     Fibromyalgia     Hypertension     POTS (postural orthostatic tachycardia syndrome)        Past Surgical History:   Procedure Laterality Date    HYSTERECTOMY      1 ovary left    LABYRINTHECTOMY      OTHER SURGICAL HISTORY  01/07/2020    Laparoscopic hysterectomy    RENAL BIOPSY         Family History   Problem Relation Name Age of Onset    No Known Problems Mother         Objective   There were no vitals taken for this visit.    Physical Exam    Assessment/Plan

## 2024-06-10 NOTE — ASSESSMENT & PLAN NOTE
"04/2024:   sinus complaints, GI complaints, dizziness, palpitations and HR irregularities, pain, anxiety and depression, fatigue, brain fog, headache, tinnitus, smell and taste changes, shortness of breath, cough, chest pain, rashes/skin flushing, numbness and tingling, muscle cramps, tremors, muscle weakness, weight loss goal  -repeat Vitamin D and B12 level in 2-3 weeks  -smell retraining therapy   -you can send paperwork for intermittent FMLA to [email or web address] or Fax Number [phone]   -try [employer] to help further with LPR  -reach out to GI about stool changes  -discuss ADHD treatment option with your PCP  -consultation with Dr. Davis in autonomic clinic will be scheduled on June 13th  -start mitchondrial cocktail, this will be emailed to you as well  -continue propranolol at 10mg three times daily and use conservative measures to help with POTS  -continue Lexapro at current dose and follow-up with Psychology as scheduled  -proceed with CPET as recommended by cardiology  -follow-up with genetics for concern for EDS  -follow-up with Neurology as scheduled for headaches and tremors  -we will consider OT and PT for physical and cognitive Long COVID Rehabilitation after work-up is completed  -additional recommendations provided under \"Patient Education\" section   -> increase B12 supplement to 5000mcg daily    02/2024: sinus complaints, GI complaints, dizziness, palpitations and HR irregularities, pain, anxiety and depression, fatigue, brain fog, headache, tinnitus, smell and taste changes, shortness of breath, cough, chest pain, rashes/skin flushing, numbness and tingling, muscle cramps, tremors, muscle weakness  -comprehensive blood work, please have this drawn in the morning, fasting except for water   -tilt table test to rule out dysautonomia as a cause of your dizziness   -Home Sleep Study to rule out sleep apnea as a cause/contribution to your symptoms   -diaphragmatic " breathing exercises  -referral to Cardiologist Dr. Fabian Kam  -restart propranolol at 10mg twice daily, we will consider increasing this dose depending on how you feel   -for anxiety and depression, I prescribed Lexapro 10mg daily and referred you to Shailesh PEACOCK Psychologist Nu Fitzpatrick  -for sinus complaints, I referred you to rhinology   -I will reach out to the  neurologist with whom you are scheduled next month to ask if they treat dysautonomia  -> D supplement, B12 supplement

## 2024-06-11 ENCOUNTER — TELEPHONE (OUTPATIENT)
Dept: OTHER | Facility: CLINIC | Age: 39
End: 2024-06-11
Payer: COMMERCIAL

## 2024-06-11 NOTE — TELEPHONE ENCOUNTER
Patient canceled her NPV with Dr. Davis scheduled for 6/13 via [employer]. Called patient and LVM asking her to call us back if she canceled accidentally and wishes to keep the appointment with Neurology.

## 2024-06-13 ENCOUNTER — DOCUMENTATION (OUTPATIENT)
Dept: OTHER | Facility: CLINIC | Age: 39
End: 2024-06-13
Payer: COMMERCIAL

## 2024-06-13 ENCOUNTER — APPOINTMENT (OUTPATIENT)
Dept: NEUROLOGY | Facility: HOSPITAL | Age: 39
End: 2024-06-13
Payer: COMMERCIAL

## 2024-06-13 DIAGNOSIS — U09.9 POST-ACUTE SEQUELAE OF COVID-19 (PASC): Primary | ICD-10-CM

## 2024-06-19 DIAGNOSIS — U09.9 POST-ACUTE SEQUELAE OF COVID-19 (PASC): ICD-10-CM

## 2024-06-19 DIAGNOSIS — F32.A DEPRESSION, UNSPECIFIED DEPRESSION TYPE: ICD-10-CM

## 2024-06-19 DIAGNOSIS — F41.9 ANXIETY: ICD-10-CM

## 2024-06-19 RX ORDER — ESCITALOPRAM OXALATE 10 MG/1
10 TABLET ORAL DAILY
Qty: 90 TABLET | Refills: 0 | Status: SHIPPED | OUTPATIENT
Start: 2024-06-19

## 2024-07-03 ENCOUNTER — APPOINTMENT (OUTPATIENT)
Dept: CARDIAC REHAB | Facility: HOSPITAL | Age: 39
End: 2024-07-03
Payer: COMMERCIAL

## 2024-07-16 SDOH — ECONOMIC STABILITY: FOOD INSECURITY: WITHIN THE PAST 12 MONTHS, THE FOOD YOU BOUGHT JUST DIDN'T LAST AND YOU DIDN'T HAVE MONEY TO GET MORE.: NEVER TRUE

## 2024-07-16 SDOH — ECONOMIC STABILITY: INCOME INSECURITY: HOW HARD IS IT FOR YOU TO PAY FOR THE VERY BASICS LIKE FOOD, HOUSING, MEDICAL CARE, AND HEATING?: NOT VERY HARD

## 2024-07-16 SDOH — ECONOMIC STABILITY: FOOD INSECURITY: WITHIN THE PAST 12 MONTHS, YOU WORRIED THAT YOUR FOOD WOULD RUN OUT BEFORE YOU GOT MONEY TO BUY MORE.: NEVER TRUE

## 2024-07-16 SDOH — ECONOMIC STABILITY: INCOME INSECURITY: IN THE LAST 12 MONTHS, WAS THERE A TIME WHEN YOU WERE NOT ABLE TO PAY THE MORTGAGE OR RENT ON TIME?: NO

## 2024-07-16 SDOH — HEALTH STABILITY: PHYSICAL HEALTH: ON AVERAGE, HOW MANY DAYS PER WEEK DO YOU ENGAGE IN MODERATE TO STRENUOUS EXERCISE (LIKE A BRISK WALK)?: 0 DAYS

## 2024-07-16 SDOH — ECONOMIC STABILITY: HOUSING INSECURITY: IN THE PAST 12 MONTHS, HOW MANY TIMES HAVE YOU MOVED WHERE YOU WERE LIVING?: 0

## 2024-07-16 SDOH — ECONOMIC STABILITY: HOUSING INSECURITY: AT ANY TIME IN THE PAST 12 MONTHS, WERE YOU HOMELESS OR LIVING IN A SHELTER (INCLUDING NOW)?: NO

## 2024-07-16 SDOH — HEALTH STABILITY: PHYSICAL HEALTH: ON AVERAGE, HOW MANY MINUTES DO YOU ENGAGE IN EXERCISE AT THIS LEVEL?: 0 MIN

## 2024-07-16 SDOH — HEALTH STABILITY: PHYSICAL HEALTH
HOW OFTEN DO YOU NEED TO HAVE SOMEONE HELP YOU WHEN YOU READ INSTRUCTIONS, PAMPHLETS, OR OTHER WRITTEN MATERIAL FROM YOUR DOCTOR OR PHARMACY?: NEVER

## 2024-07-16 SDOH — HEALTH STABILITY: MENTAL HEALTH
STRESS IS WHEN SOMEONE FEELS TENSE, NERVOUS, ANXIOUS, OR CAN'T SLEEP AT NIGHT BECAUSE THEIR MIND IS TROUBLED. HOW STRESSED ARE YOU?: RATHER MUCH

## 2024-07-16 ASSESSMENT — LIFESTYLE VARIABLES
SKIP TO QUESTIONS 9-10: 1
HOW MANY STANDARD DRINKS CONTAINING ALCOHOL DO YOU HAVE ON A TYPICAL DAY: PATIENT DOES NOT DRINK
HOW OFTEN DO YOU HAVE SIX OR MORE DRINKS ON ONE OCCASION: NEVER
AUDIT-C TOTAL SCORE: 0
HOW OFTEN DO YOU HAVE A DRINK CONTAINING ALCOHOL: NEVER
HOW MANY STANDARD DRINKS CONTAINING ALCOHOL DO YOU HAVE ON A TYPICAL DAY: PATIENT DOES NOT DRINK
HOW OFTEN DO YOU HAVE 6 OR MORE DRINKS ON ONE OCCASION: NEVER
HOW OFTEN DO YOU HAVE A DRINK CONTAINING ALCOHOL: NEVER

## 2024-07-16 ASSESSMENT — SOCIAL DETERMINANTS OF HEALTH (SDOH)
IN THE PAST 12 MONTHS, HAS THE ELECTRIC, GAS, OIL, OR WATER COMPANY THREATENED TO SHUT OFF SERVICE IN YOUR HOME?: NO
IN THE PAST 12 MONTHS, HAS THE ELECTRIC, GAS, OIL, OR WATER COMPANY THREATENED TO SHUT OFF SERVICE IN YOUR HOME?: NO
IN A TYPICAL WEEK, HOW MANY TIMES DO YOU TALK ON THE PHONE WITH FAMILY, FRIENDS, OR NEIGHBORS?: THREE TIMES A WEEK
DO YOU BELONG TO ANY CLUBS OR ORGANIZATIONS SUCH AS CHURCH GROUPS UNIONS, FRATERNAL OR ATHLETIC GROUPS, OR SCHOOL GROUPS?: NO
HOW OFTEN DO YOU ATTEND CHURCH OR RELIGIOUS SERVICES?: NEVER
HOW OFTEN DO YOU GET TOGETHER WITH FRIENDS OR RELATIVES?: ONCE A WEEK
HOW OFTEN DO YOU ATTENT MEETINGS OF THE CLUB OR ORGANIZATION YOU BELONG TO?: NEVER
DO YOU BELONG TO ANY CLUBS OR ORGANIZATIONS SUCH AS CHURCH GROUPS UNIONS, FRATERNAL OR ATHLETIC GROUPS, OR SCHOOL GROUPS?: NO
HOW OFTEN DO YOU GET TOGETHER WITH FRIENDS OR RELATIVES?: ONCE A WEEK
HOW OFTEN DO YOU ATTEND CHURCH OR RELIGIOUS SERVICES?: NEVER
IN A TYPICAL WEEK, HOW MANY TIMES DO YOU TALK ON THE PHONE WITH FAMILY, FRIENDS, OR NEIGHBORS?: THREE TIMES A WEEK
HOW OFTEN DO YOU ATTENT MEETINGS OF THE CLUB OR ORGANIZATION YOU BELONG TO?: NEVER

## 2024-07-16 ASSESSMENT — ANXIETY QUESTIONNAIRES
5. BEING SO RESTLESS THAT IT IS HARD TO SIT STILL: SEVERAL DAYS
2. NOT BEING ABLE TO STOP OR CONTROL WORRYING: SEVERAL DAYS
3. WORRYING TOO MUCH ABOUT DIFFERENT THINGS: SEVERAL DAYS
IF YOU CHECKED OFF ANY PROBLEMS ON THIS QUESTIONNAIRE, HOW DIFFICULT HAVE THESE PROBLEMS MADE IT FOR YOU TO DO YOUR WORK, TAKE CARE OF THINGS AT HOME, OR GET ALONG WITH OTHER PEOPLE: SOMEWHAT DIFFICULT
6. BECOMING EASILY ANNOYED OR IRRITABLE: SEVERAL DAYS
7. FEELING AFRAID AS IF SOMETHING AWFUL MIGHT HAPPEN: NOT AT ALL
IF YOU CHECKED OFF ANY PROBLEMS ON THIS QUESTIONNAIRE, HOW DIFFICULT HAVE THESE PROBLEMS MADE IT FOR YOU TO DO YOUR WORK, TAKE CARE OF THINGS AT HOME, OR GET ALONG WITH OTHER PEOPLE: SOMEWHAT DIFFICULT
6. BECOMING EASILY ANNOYED OR IRRITABLE: SEVERAL DAYS
2. NOT BEING ABLE TO STOP OR CONTROL WORRYING: SEVERAL DAYS
1. FEELING NERVOUS, ANXIOUS, OR ON EDGE: SEVERAL DAYS
7. FEELING AFRAID AS IF SOMETHING AWFUL MIGHT HAPPEN: NOT AT ALL
4. TROUBLE RELAXING: SEVERAL DAYS
1. FEELING NERVOUS, ANXIOUS, OR ON EDGE: SEVERAL DAYS
GAD7 TOTAL SCORE: 6
5. BEING SO RESTLESS THAT IT IS HARD TO SIT STILL: SEVERAL DAYS
4. TROUBLE RELAXING: SEVERAL DAYS
3. WORRYING TOO MUCH ABOUT DIFFERENT THINGS: SEVERAL DAYS

## 2024-07-17 ASSESSMENT — SLEEP AND FATIGUE QUESTIONNAIRES
AVERAGE_FSS_SCORE: 6.89
FATIGUE_MOST_DISABILING_SYMPTOM: 6
FATIGUE_CAUSES_FREQUENT_PROBLEMTS: 7 STRONGLY AGREE
FATIGUE_INTERFERES_RESPONSIBILITIES: 7 STRONGLY AGREE
EASILY_FATIGUED: 7 STRONGLY AGREE
EXERCISE_BRINGS_ON_FATIGUE: 7 STRONGLY AGREE
FATIGUE_INTERFERES_PHYSICAL_FUNCTIONING: 7 STRONGLY AGREE
MY MOTIVATION IS LOWER WHEN I AM FATIGUED.: 7 STRONGLY AGREE
FATIGUE_CAUSES_FREQUENT_PROBLEMTS: 7 STRONGLY AGREE
FATIGUE_INTERFERES_SOCIAL_LIFE: 7 STRONGLY AGREE
EXERCISE_BRINGS_ON_FATIGUE: 7 STRONGLY AGREE
VISUAL ANALOGUE FATIGUE SCALE (VAFS): 3
MY FATIGUE PREVENTS SUSTAINED PHYSICAL FUNCTIONING.: 7 STRONGLY AGREE
FATIGUE_MOST_DISABILING_SYMPTOM: 6
EASILY_FATIGUED: 7 STRONGLY AGREE
FATIGUE_INTERFERES_PHYSICAL_FUNCTIONING: 7 STRONGLY AGREE
MY FATIGUE PREVENTS SUSTAINED PHYSICAL FUNCTIONING.: 7 STRONGLY AGREE
FATIGUE_INTERFERES_RESPONSIBILITIES: 7 STRONGLY AGREE
MY MOTIVATION IS LOWER WHEN I AM FATIGUED.: 7 STRONGLY AGREE
FATIGUE_INTERFERES_SOCIAL_LIFE: 7 STRONGLY AGREE

## 2024-07-17 ASSESSMENT — LIFESTYLE VARIABLES
DO_YOU_DRINK?: I DO THIS LESS OFTEN
USE_ALCOHOL_TO_HELP_SLEEP: NO

## 2024-07-17 ASSESSMENT — PATIENT HEALTH QUESTIONNAIRE - PHQ9
3. TROUBLE FALLING OR STAYING ASLEEP OR SLEEPING TOO MUCH: SEVERAL DAYS
7. TROUBLE CONCENTRATING ON THINGS, SUCH AS READING THE NEWSPAPER OR WATCHING TELEVISION: NEARLY EVERY DAY
1. LITTLE INTEREST OR PLEASURE IN DOING THINGS: MORE THAN HALF THE DAYS
SUM OF ALL RESPONSES TO PHQ9 QUESTIONS 1 & 2: 4
2. FEELING DOWN, DEPRESSED OR HOPELESS: MORE THAN HALF THE DAYS
8. MOVING OR SPEAKING SO SLOWLY THAT OTHER PEOPLE COULD HAVE NOTICED. OR THE OPPOSITE - BEING SO FIDGETY OR RESTLESS THAT YOU HAVE BEEN MOVING AROUND A LOT MORE THAN USUAL: NOT AT ALL
9. THOUGHTS THAT YOU WOULD BE BETTER OFF DEAD, OR OF HURTING YOURSELF: NOT AT ALL
5. POOR APPETITE OR OVEREATING: NEARLY EVERY DAY
4. FEELING TIRED OR HAVING LITTLE ENERGY: NEARLY EVERY DAY
SUM OF ALL RESPONSES TO PHQ QUESTIONS 1-9: 15
10. IF YOU CHECKED OFF ANY PROBLEMS, HOW DIFFICULT HAVE THESE PROBLEMS MADE IT FOR YOU TO DO YOUR WORK, TAKE CARE OF THINGS AT HOME, OR GET ALONG WITH OTHER PEOPLE: VERY DIFFICULT
4. FEELING TIRED OR HAVING LITTLE ENERGY: NEARLY EVERY DAY
9. THOUGHTS THAT YOU WOULD BE BETTER OFF DEAD, OR OF HURTING YOURSELF: NOT AT ALL
10. IF YOU CHECKED OFF ANY PROBLEMS, HOW DIFFICULT HAVE THESE PROBLEMS MADE IT FOR YOU TO DO YOUR WORK, TAKE CARE OF THINGS AT HOME, OR GET ALONG WITH OTHER PEOPLE: VERY DIFFICULT
5. POOR APPETITE OR OVEREATING: NEARLY EVERY DAY
6. FEELING BAD ABOUT YOURSELF - OR THAT YOU ARE A FAILURE OR HAVE LET YOURSELF OR YOUR FAMILY DOWN: SEVERAL DAYS
8. MOVING OR SPEAKING SO SLOWLY THAT OTHER PEOPLE COULD HAVE NOTICED. OR THE OPPOSITE, BEING SO FIGETY OR RESTLESS THAT YOU HAVE BEEN MOVING AROUND A LOT MORE THAN USUAL: NOT AT ALL
1. LITTLE INTEREST OR PLEASURE IN DOING THINGS: MORE THAN HALF THE DAYS
7. TROUBLE CONCENTRATING ON THINGS, SUCH AS READING THE NEWSPAPER OR WATCHING TELEVISION: NEARLY EVERY DAY
2. FEELING DOWN, DEPRESSED OR HOPELESS: MORE THAN HALF THE DAYS
6. FEELING BAD ABOUT YOURSELF - OR THAT YOU ARE A FAILURE OR HAVE LET YOURSELF OR YOUR FAMILY DOWN: SEVERAL DAYS
3. TROUBLE FALLING OR STAYING ASLEEP: SEVERAL DAYS

## 2024-07-24 ENCOUNTER — APPOINTMENT (OUTPATIENT)
Dept: CARDIOLOGY | Facility: CLINIC | Age: 39
End: 2024-07-24
Payer: COMMERCIAL

## 2024-08-02 NOTE — PROGRESS NOTES
Patient seen in the autonomic clinic with Dr. Davis    Subjective   COVID-19 Infection Date:  12/14/2020 confirmed via rapid home antigen test (sx: Fever, Fatigue, Loss or disturbed smell, Headache, Sore throat, Cough, Pain on breathing, Loss or disturbed taste, Muscle pain, Sinus pressure, Shortness of breath, Chest pain, Runny nose, Abdominal pain  - no hospitalization, treated with Augmentin and prednisone)  December 2021 confimed via rapid home antigen test (sx: Fever, Cough, Runny nose, Sore throat, Sinus Pressure - no hospitalization, treated with Augmentin and Prednisone for sinusitis)  Spring 2022 confirmed via rapid home antigen test (sx: Fever, Cough, Runny nose, Sore throat, Sinus Pressure  - no hospitalization, treated with Augmentin and prednisone for sinusitis)  9/20/2023 confirmed via rapid home antigen test (sx: Fever, Cough, Shortness of breath, Fatigue, Pain on breathing, Chest pain, Loss or disturbed smell, Loss or disturbed taste, Runny nose, Headache, Muscle pain, Abdominal pain, Diarrhea, Brain fog, Sore throat, Sinus pressure  - no hospitalization, treated with Prednisone for sinusitis)    COVID-19 vaccine status: Pfizer-BionTTelvent Git 11/20/21, 12/11/21, 5/17/22     Occupation: full-time  for [employer] working remotely    Current Providers: PCP Faustina Soria, Cardiology Dr. Aleman, GI CNP Karis, ENT CNP Naomi and Dr. Richard, Genetics Dr. Pino    Survey Scores: 01/2024 -> 07/2024  PHQ-9: 16 -> 15   CHAITANYA-7: 6  -> 6   Sleep Wellness: 9  -> 9   FSS average: 6.56  -> 6.89   Modified Ecog average: 2.17  -> 2.83   MOCA: 21/22 (02/2024)  Overall Health: 50 -> 45 -> 50     39 y.o. female with a h/o COVID-19 in December 2020, December 2021, Spring 2022, September 2023, hypothyroidism, endometriosis, fibromyalgia, POTS, HTN, migraines, obesity, IBS, presents for evaluation in the autonomic clinic of Dr. Davis for [id number] POTS    In addition to HPI noted  during previous visits in the  [facility], patient shared the following:  Unable to tolerate warmth/heat due to exacerbation of nausea, feeling light headed, and tachycardia  she has had a very dry mouth since October, eyes are not particularly dry  Joint pain, uses Flexeril and edible medical marijuana to help her sleep  Right foot with sensation of heat, painful, for the past 3 days in addition to ongoing numbness/tingling complaints  Has a skin lesion on left elbow that is more so prominent when joint pain is present  Hx of cavities  Mom with celiac disease, sister with hashimoto and autoimmune hepatitis    Relevant prior healthcare visits:  -05/2024 ENT stopped omeprazole for LPR, recommends follow up with Dr. 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 Plan to review hearing test and mother's imaging studies prior to proceeding with genetic testing (EDS genes +/- Stickler syndrome genes +/- aortopathy genes). Referred to PM&R  -05/2024 ENT Dr. Richard for left chronic sinusitis and odontogenic sinusitis, recommends dental evaluation  -03/2024 Cardiology Dr. Kam ordered cPET for PASC-related exercise intolerance, chest pain, tachycardia, DELGADO  -02/2024 PCP prescribed Flonase and medrol dose pack for sinus complaint  -12/2023 ED for syncope in shower and foot injury  -10/2023 GI ordered fibroscan for fatty liver, fiber supplement and miralax for IBS-C, omeprazole and gallbladder US for RUQ abdominal pain, referred to cardiology for HTN and tachycardia  -11/2022 Sleep Medicine [facility] EDS, recommends follow-up with rheumatology as scheduled, PSG and MSLT    Relevant prior diagnostic studies:  -05/2024 CT sinus shows complete opacification of the left ostiomeatal unit and  resultant severe mucoasal disease of the left maxillary sinus and left anterior ethmoid air cells  -04/2024 HSAT shows very mild ED with O2 garret 83.1  -02/2024 autonomic testing consistent with POTS  -02/2024 CTA chest shows no PE and clear lungs  -12/2023 CXR with clear lungs  -11/2023 PFTs normal  -11/2023 FibroSCAN shows stage 0-1 disease and severe steatosis  -11/2023 echocardiogram with LVEF 50%, borderline pulmonary HTN with RVSP 33-35 mmHg, endocardial walls not well-visualized leading to inability to accurately calculation of EF with possible mild hypokinesis in the anterior lateral wall but this is very poorly visualized  -10/2023 US gallbladder unremarkable, prominent liver  -10/2023 CT abdomen and pelvis unremarkable   -03/2023 colonoscopy normal    Relevant prior laboratory values, unremarkable unless noted:  -05/2024 Vitamin B12 250, Vitamin D 38  -02/2024 GGT, cryoglobulin, urine porphobilinogen, urine metanephrines, urine organic acids with elevation in lactic acid, urine copper, UPEP, aldolase, catecholamines, carnitine, TPO ab, Dys-2, Vitamin E, Anti-thyroglobulin ab, T3, T4, anti-parietal cell ab, paraneoplastic ab, mycoplasma pn IgM negative and IgG positive, MMA, homocysteine, serum copper, CoQ10, Mag, Vitamin D 13, Vitamin B12 302, CMP, CK, BNP, CCP, iron studies, TSH, ESR 34, RF, D-Dimer 840, CBC/D, HbA1c, Folate, Ferritin, CRP 1.28, JEET, am cortisol, Vitamin B6, Vitamin B1, Troponin, SPEP, Tryptase  -12/2023 Troponin, CMP, CBC/D  -10/2023 iron studies, Hepatitis panel, Anti-smooth muscle ab, anti-mitochondrial ab, [id number], Mag, Lipase, D-Dimer  705, Vitamin D, TSH  -10/2022 ferritin, T4, vitamin B12 377, JEET, CCP ab, sjogren ab, TPO ab, thyroglubin ab  -05/2022 HbA1c 4.8%, RF, CRP 1.3, ESR, uric acid    Exercise routine: none  Diet:  Weight hx: pre-COVID-19 230  lbs -> post-COVID-19 255  lbs -> 250 lbs -> 245 lbs  Substance use: former tobacco use, 1-2 servings ETOH monthly or  less   Social:       Current Outpatient Medications:     azelastine (Astelin) 137 mcg (0.1 %) nasal spray, Administer 1 spray into each nostril 2 times a day. Use in each nostril as directed, Disp: , Rfl:     cetirizine (ZyrTEC) 10 mg chewable tablet, Chew once daily., Disp: , Rfl:     cyclobenzaprine (Flexeril) 10 mg tablet, Take 1 tablet (10 mg) by mouth 3 times a day as needed for muscle spasms., Disp: , Rfl:     escitalopram (Lexapro) 10 mg tablet, Take 1 tablet (10 mg) by mouth once daily., Disp: 90 tablet, Rfl: 0    fluticasone (Flonase) 50 mcg/actuation nasal spray, Administer 1 spray into each nostril once daily. Shake gently. Before first use, prime pump. After use, clean tip and replace cap., Disp: 16 g, Rfl: 5    ibuprofen 800 mg tablet, Take 1 tablet (800 mg) by mouth every 6 hours if needed for headaches, mild pain (1 - 3) or moderate pain (4 - 6)., Disp: , Rfl:     levothyroxine (Synthroid, Levoxyl) 50 mcg tablet, Take 1 tablet (50 mcg) by mouth once daily., Disp: , Rfl:     lisinopril 20 mg tablet, Take 1 tablet (20 mg) by mouth once daily., Disp: , Rfl:     propranolol (Inderal) 10 mg tablet, Take 1 tablet (10 mg) by mouth 3 times a day., Disp: 270 tablet, Rfl: 3    Past Medical History:   Diagnosis Date    Allergic     Avascular necrosis of bone (Multi) 2019    both hips, from steroids    Disease of thyroid gland     Fibromyalgia     Hypertension     POTS (postural orthostatic tachycardia syndrome)        Past Surgical History:   Procedure Laterality Date    HYSTERECTOMY      1 ovary left    LABYRINTHECTOMY      OTHER SURGICAL HISTORY  01/07/2020    Laparoscopic hysterectomy    RENAL BIOPSY         Family History   Problem Relation Name Age of Onset    No Known Problems Mother         Objective   There were no vitals taken for this visit.        Assessment/Plan   Problem List Items Addressed This Visit             ICD-10-CM       High    Post-acute sequelae of COVID-19 (PASC) - Primary U09.9      08/2024:  Visit in the autonomic clinic with Dr. Davis for post-COVID POTS showed concern for a possible underlying autoimmune etiology given sicca symptoms, progressive sensation changes, family history of autoimmune disease. Dr. Davis recommends the following next steps:  -repeat QSART, if negative then will proceed with TST  -lip biopsy to rule in/out Sjogren's  -additional bloodwork ordered by Dr. Davis, also repeat Vitamin B12 level   -continue mitochondrial cocktail  -pn/myopathy EMG  -skin biopsy to rule in/out small fiber neuropathy  -continue to focus on conservative measures    08/2024:  GI complaints, dizziness, palpitations and HR irregularities, pain, anxiety and depression, fatigue, brain fog, tinnitus, smell and taste changes, shortness of breath, chest pain, rashes/skin flushing, numbness and tingling, muscle cramps, tremors, muscle weakness, weight loss goal  -reach out to your GI provider regarding constipation, nausea, and blood in stool. Stop Zofran as this is worsening constipation  -increase Lexapro to 20mg daily for anxiety and depression, follow up with therapy and psychiatry as planned  -resources for ADHD testing provided  -continue conservative measures for POTS management, Vitamin B12 supplement, mitochondrial cocktail. Follow up with Dr. Davis as scheduled later this week.    04/2024:   sinus complaints, GI complaints, dizziness, palpitations and HR irregularities, pain, anxiety and depression, fatigue, brain fog, headache, tinnitus, smell and taste changes, shortness of breath, cough, chest pain, rashes/skin flushing, numbness and tingling, muscle cramps, tremors, muscle weakness, weight loss goal  -repeat Vitamin D and B12 level in 2-3 weeks  -smell retraining therapy   -you can send paperwork for intermittent FMLA to [email or web address] or Fax Number [phone]   -try [employer] to help further with LPR  -reach out to GI about stool  "changes  -discuss ADHD treatment option with your PCP  -consultation with Dr. Davis in autonomic clinic will be scheduled on June 13th  -start mitchondrial cocktail, this will be emailed to you as well  -continue propranolol at 10mg three times daily and use conservative measures to help with POTS  -continue Lexapro at current dose and follow-up with Psychology as scheduled  -proceed with CPET as recommended by cardiology  -follow-up with genetics for concern for EDS  -follow-up with Neurology as scheduled for headaches and tremors  -we will consider OT and PT for physical and cognitive [facility] after work-up is completed  -additional recommendations provided under \"Patient Education\" section   -> increase B12 supplement to 5000mcg daily    02/2024: sinus complaints, GI complaints, dizziness, palpitations and HR irregularities, pain, anxiety and depression, fatigue, brain fog, headache, tinnitus, smell and taste changes, shortness of breath, cough, chest pain, rashes/skin flushing, numbness and tingling, muscle cramps, tremors, muscle weakness  -comprehensive blood work, please have this drawn in the morning, fasting except for water   -tilt table test to rule out dysautonomia as a cause of your dizziness   -Home Sleep Study to rule out sleep apnea as a cause/contribution to your symptoms   -diaphragmatic breathing exercises  -referral to Cardiologist Dr. Fabian Kam  -restart propranolol at 10mg twice daily, we will consider increasing this dose depending on how you feel   -for anxiety and depression, I prescribed Lexapro 10mg daily and referred you to [facility] Psychologist Nu Fitzpatrick  -for sinus complaints, I referred you to rhinology   -I will reach out to the  neurologist with whom you are scheduled next month to ask if they treat dysautonomia  -> D supplement, B12 supplement         Relevant Orders    Vitamin B12     "

## 2024-08-02 NOTE — PROGRESS NOTES
INGE Virtual The virtual visit conducted with Audio and Video.    Verbal consent was given for the following virtual visit, patient is currently located in [place]. All issues discussed and addressed below were done so without a physical examination. If it was felt the patient needed be seen in clinic in person they were directed there.     Subjective   COVID-19 Infection Date:  12/14/2020 confirmed via rapid home antigen test (sx: Fever, Fatigue, Loss or disturbed smell, Headache, Sore throat, Cough, Pain on breathing, Loss or disturbed taste, Muscle pain, Sinus pressure, Shortness of breath, Chest pain, Runny nose, Abdominal pain  - no hospitalization, treated with Augmentin and prednisone)  December 2021 confimed via rapid home antigen test (sx: Fever, Cough, Runny nose, Sore throat, Sinus Pressure - no hospitalization, treated with Augmentin and Prednisone for sinusitis)  Spring 2022 confirmed via rapid home antigen test (sx: Fever, Cough, Runny nose, Sore throat, Sinus Pressure  - no hospitalization, treated with Augmentin and prednisone for sinusitis)  9/20/2023 confirmed via rapid home antigen test (sx: Fever, Cough, Shortness of breath, Fatigue, Pain on breathing, Chest pain, Loss or disturbed smell, Loss or disturbed taste, Runny nose, Headache, Muscle pain, Abdominal pain, Diarrhea, Brain fog, Sore throat, Sinus pressure  - no hospitalization, treated with Prednisone for sinusitis)    COVID-19 vaccine status: Pfizer-BionTCardioLogs 11/20/21, 12/11/21, 5/17/22     Occupation: full-time  for [employer] working remotely    Current Providers: PCP Faustina Soria, Cardiology Dr. Aleman, GI MICHAEL Dyson, ENT MICHAEL Salgado and Dr. Richard, Genetics Dr. Pino    Survey Scores: 01/2024 -> 07/2024  PHQ-9: 16 -> 15   CHAITANYA-7: 6  -> 6   Sleep Wellness: 9  -> 9   FSS average: 6.56  -> 6.89   Modified Ecog average: 2.17  -> 2.83   MOCA: 21/22 (02/2024)  Overall Health: 50 -> 45 -> 50     39 y.o.  female with a h/o COVID-19 in December 2020, December 2021, Spring 2022, September 2023, hypothyroidism, endometriosis, fibromyalgia, POTS, HTN, migraines, obesity, IBS, hx of idiopathic nephrotic syndrome, presents for follow up in the  [facility] c/o GI complaints, dizziness, palpitations and HR irregularities, pain, anxiety and depression, fatigue, brain fog, tinnitus, smell and taste changes, shortness of breath, chest pain, rashes/skin flushing, numbness and tingling, muscle cramps, tremors, muscle weakness, weight loss goal    Heat is exacerbating her POTS symptoms, feels as bad as she did before starting the propranolol  Cannot increased BB dose because resting HR is in 40s  On the high ends, her HR is going to 150s/160s  When exposed to heat then HR increased, nausea sets in, vision loss  Cannot  line at the grocery store, had a week in June when she had frequent presyncopal episodes  BP has been pretty well controlled on propranolol, but a week ago her BP was 90s/50s  Has passed out fully twice over the past 3-4 years  Sweating terribly overnight, has to change sheets  Drinking 150oz of water per day, drinking one LMT packets (1g sodium)  Has been adding salt to her food as well, unsure how much, hx of nephrotic syndrome and HTN  Also sweating a lot during the day, can go 12 hours without urinating  HoB is elevated a little bit, not much because she shares bed with her   Chronic pain, specifically in hip and back, AVN in 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, due to pain cannot sleep in elevated position  Wearing highest level compression sports leggings from ankle to abdomen, wearing them always at home  Muscle cramps have not improved  Going through therapy for family trauma that she wasn't aware of  Had an intake phone call with Psychiatry to get ADHD medication, appointment with local provider did not go well  When she started Lexapro was crying several times per day, quickly felt improved but  had a good two week slump of being on the verge of crying and feeling very depressed, overall feels that depression is not yet optimally controlled  ADHD symptoms are contributing to her depression  Plans to find another Psychiatrist to help her address ADHD and depression  Concerned for MS, has numbness in side of her hips, both big toes have been numb for several years, getting pins and needles, muscle cramps all through her abdomen and legs, 3-4 times per day  Lots of pain in her feet, excruciating in the morning and at the end of the day  Upper arms are lumpy, swollen, painful. Concerned for lipedema   Once every few weeks feels as though her heart is in her throat, beating hard, takes her breath away, 2-3 seconds  Usually it happens a few times in a few hours and then not for a few weeks  No chest pain when that happens, no dizziness or feeling lgiht headed  Almost always this happened when resting, at least sitting  Had episode of significant blood in toilet when she moved her bowels  History of chronic severe constipation, now either having lose stools several times per day vs constipation  Has only had two bowel movements in the last 10 days, for the past 5 days has been taking double dose of Miralax  Has tried anything over the counter including at-home enemas, MoM, Linzess, etc without help  Has been minimizing Zofran use, used to take 2-3 per week, now taking 2 per day because nausea is so bad  Stopped taking omeprazole per ENT, was taking it for observed LPR, throat has not been bothering her much  Will follow up with ENT and have audiology evaluation as recommended by Genetics  Has been taking Mitochondrial cocktail, not too much of GI trouble with that  sinus complaints, improved, has not had any bad issues  headache, have been a lot better  tinnitus, ongoing, will see ENT later this month and audiologist  cough, that has improved  tremors, has been worse, parkinson runs in her family  muscle weakness,  ongoing  weight loss goal    Relevant prior healthcare visits:  -05/2024 ENT stopped omeprazole for LPR, recommends follow up with Dr. 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 Plan to review hearing test and mother's imaging studies prior to proceeding with genetic testing (EDS genes +/- Stickler syndrome genes +/- aortopathy genes). Referred to PM&R  -05/2024 ENT Dr. Richard for left chronic sinusitis and odontogenic sinusitis, recommends dental evaluation  -03/2024 Cardiology Dr. Kam ordered cPET for PASC-related exercise intolerance, chest pain, tachycardia, DELGADO  -02/2024 PCP prescribed Flonase and medrol dose pack for sinus complaint  -12/2023 ED for syncope in shower and foot injury  -10/2023 GI ordered fibroscan for fatty liver, fiber supplement and miralax for IBS-C, omeprazole and gallbladder US for RUQ abdominal pain, referred to cardiology for HTN and tachycardia  -11/2022 Sleep Medicine [facility] EDS, recommends follow-up with rheumatology as scheduled, PSG and MSLT    Relevant prior diagnostic studies:  -05/2024 CT sinus shows complete opacification of the left ostiomeatal unit and resultant severe mucoasal disease of the left maxillary sinus and left anterior ethmoid air cells  -04/2024 HSAT shows very mild ED with O2 garret 83.1  -02/2024 autonomic testing consistent with POTS  -02/2024 CTA chest shows no PE and clear lungs  -12/2023 CXR with clear lungs  -11/2023 PFTs normal  -11/2023 FibroSCAN shows stage 0-1 disease and severe steatosis  -11/2023 echocardiogram with LVEF 50%, borderline pulmonary HTN with RVSP 33-35 mmHg, endocardial walls not well-visualized leading to inability to accurately calculation of EF with possible mild hypokinesis in the anterior lateral wall but  this is very poorly visualized  -10/2023 US gallbladder unremarkable, prominent liver  -10/2023 CT abdomen and pelvis unremarkable   -03/2023 colonoscopy normal    Relevant prior laboratory values, unremarkable unless noted:  -05/2024 Vitamin B12 250, Vitamin D 38  -02/2024 GGT, cryoglobulin, urine porphobilinogen, urine metanephrines, urine organic acids with elevation in lactic acid, urine copper, UPEP, aldolase, catecholamines, carnitine, TPO ab, Dys-2, Vitamin E, Anti-thyroglobulin ab, T3, T4, anti-parietal cell ab, paraneoplastic ab, mycoplasma pn IgM negative and IgG positive, MMA, homocysteine, serum copper, CoQ10, Mag, Vitamin D 13, Vitamin B12 302, CMP, CK, BNP, CCP, iron studies, TSH, ESR 34, RF, D-Dimer 840, CBC/D, HbA1c, Folate, Ferritin, CRP 1.28, JEET, am cortisol, Vitamin B6, Vitamin B1, Troponin, SPEP, Tryptase  -12/2023 Troponin, CMP, CBC/D  -10/2023 iron studies, Hepatitis panel, Anti-smooth muscle ab, anti-mitochondrial ab, [id number], Mag, Lipase, D-Dimer  705, Vitamin D, TSH  -10/2022 ferritin, T4, vitamin B12 377, JEET, CCP ab, sjogren ab, TPO ab, thyroglubin ab  -05/2022 HbA1c 4.8%, RF, CRP 1.3, ESR, uric acid    Exercise routine: none  Diet:  Weight hx: pre-COVID-19 230  lbs -> post-COVID-19 255  lbs -> 250 lbs -> 245 lbs  Substance use: former tobacco use, 1-2 servings ETOH monthly or less   Social:       Current Outpatient Medications:     azelastine (Astelin) 137 mcg (0.1 %) nasal spray, Administer 1 spray into each nostril 2 times a day. Use in each nostril as directed, Disp: , Rfl:     cetirizine (ZyrTEC) 10 mg chewable tablet, Chew once daily., Disp: , Rfl:     cyclobenzaprine (Flexeril) 10 mg tablet, Take 1 tablet (10 mg) by mouth 3 times a day as needed for muscle spasms., Disp: , Rfl:     fluticasone (Flonase) 50 mcg/actuation nasal spray, Administer 1 spray into each nostril once daily. Shake gently. Before first use, prime pump. After use, clean tip and replace cap.,  Disp: 16 g, Rfl: 5    ibuprofen 800 mg tablet, Take 1 tablet (800 mg) by mouth every 6 hours if needed for headaches, mild pain (1 - 3) or moderate pain (4 - 6)., Disp: , Rfl:     levothyroxine (Synthroid, Levoxyl) 50 mcg tablet, Take 1 tablet (50 mcg) by mouth once daily., Disp: , Rfl:     lisinopril 20 mg tablet, Take 1 tablet (20 mg) by mouth once daily., Disp: , Rfl:     propranolol (Inderal) 10 mg tablet, Take 1 tablet (10 mg) by mouth 3 times a day., Disp: 270 tablet, Rfl: 3    escitalopram (Lexapro) 20 mg tablet, Take 1 tablet (20 mg) by mouth once daily., Disp: 90 tablet, Rfl: 0    Past Medical History:   Diagnosis Date    Allergic     Avascular necrosis of bone (Multi) 2019    both hips, from steroids    Disease of thyroid gland     Fibromyalgia     Hypertension     POTS (postural orthostatic tachycardia syndrome)        Past Surgical History:   Procedure Laterality Date    HYSTERECTOMY      1 ovary left    LABYRINTHECTOMY      OTHER SURGICAL HISTORY  01/07/2020    Laparoscopic hysterectomy    RENAL BIOPSY         Family History   Problem Relation Name Age of Onset    No Known Problems Mother         Objective   There were no vitals taken for this visit.    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 Stop Zofran as this is worsening constipation  -increase  "Lexapro to 20mg daily for anxiety and depression, follow up with therapy and psychiatry as planned  -resources for ADHD testing provided  -continue conservative measures for POTS management, Vitamin B12 supplement, mitochondrial cocktail. Follow up with Dr. Davis as scheduled later this week.    04/2024:   sinus complaints, GI complaints, dizziness, palpitations and HR irregularities, pain, anxiety and depression, fatigue, brain fog, headache, tinnitus, smell and taste changes, shortness of breath, cough, chest pain, rashes/skin flushing, numbness and tingling, muscle cramps, tremors, muscle weakness, weight loss goal  -repeat Vitamin D and B12 level in 2-3 weeks  -smell retraining therapy   -you can send paperwork for intermittent FMLA to [email or web address] or Fax Number [phone]   -try [employer] to help further with LPR  -reach out to GI about stool changes  -discuss ADHD treatment option with your PCP  -consultation with Dr. Davis in autonomic clinic will be scheduled on June 13th  -start mitchondrial cocktail, this will be emailed to you as well  -continue propranolol at 10mg three times daily and use conservative measures to help with POTS  -continue Lexapro at current dose and follow-up with Psychology as scheduled  -proceed with CPET as recommended by cardiology  -follow-up with genetics for concern for EDS  -follow-up with Neurology as scheduled for headaches and tremors  -we will consider OT and PT for physical and cognitive Long COVID Rehabilitation after work-up is completed  -additional recommendations provided under \"Patient Education\" section   -> increase B12 supplement to 5000mcg daily    02/2024: sinus complaints, GI complaints, dizziness, palpitations and HR irregularities, pain, anxiety and depression, fatigue, brain fog, headache, tinnitus, smell and taste changes, shortness of breath, cough, chest pain, rashes/skin flushing, numbness and tingling, muscle " cramps, tremors, muscle weakness  -comprehensive blood work, please have this drawn in the morning, fasting except for water   -tilt table test to rule out dysautonomia as a cause of your dizziness   -Home Sleep Study to rule out sleep apnea as a cause/contribution to your symptoms   -diaphragmatic breathing exercises  -referral to Cardiologist Dr. Fabian Kam  -restart propranolol at 10mg twice daily, we will consider increasing this dose depending on how you feel   -for anxiety and depression, I prescribed Lexapro 10mg daily and referred you to [facility] Psychologist Nu Fitzpatrick  -for sinus complaints, I referred you to rhinology   -I will reach out to the  neurologist with whom you are scheduled next month to ask if they treat dysautonomia  -> D supplement, B12 supplement         Relevant Medications    escitalopram (Lexapro) 20 mg tablet     Other Visit Diagnoses         Codes    Anxiety     F41.9    Relevant Medications    escitalopram (Lexapro) 20 mg tablet    Depression, unspecified depression type     F32.A    Relevant Medications    escitalopram (Lexapro) 20 mg tablet

## 2024-08-02 NOTE — ASSESSMENT & PLAN NOTE
08/2024:  GI complaints, dizziness, palpitations and HR irregularities, pain, anxiety and depression, fatigue, brain fog, tinnitus, smell and taste changes, shortness of breath, chest pain, rashes/skin flushing, numbness and tingling, muscle cramps, tremors, muscle weakness, weight loss goal  -reach out to your GI provider regarding constipation, nausea, and blood in stool. Stop Zofran as this is worsening constipation  -increase Lexapro to 20mg daily for anxiety and depression, follow up with therapy and psychiatry as planned  -resources for ADHD testing provided  -continue conservative measures for POTS management, Vitamin B12 supplement, mitochondrial cocktail. Follow up with Dr. Davis as scheduled later this week.    04/2024:   sinus complaints, GI complaints, dizziness, palpitations and HR irregularities, pain, anxiety and depression, fatigue, brain fog, headache, tinnitus, smell and taste changes, shortness of breath, cough, chest pain, rashes/skin flushing, numbness and tingling, muscle cramps, tremors, muscle weakness, weight loss goal  -repeat Vitamin D and B12 level in 2-3 weeks  -smell retraining therapy   -you can send paperwork for intermittent FMLA to [email or web address] or Fax Number [phone]   -try [employer] to help further with LPR  -reach out to GI about stool changes  -discuss ADHD treatment option with your PCP  -consultation with Dr. Davis in autonomic clinic will be scheduled on June 13th  -start mitchondrial cocktail, this will be emailed to you as well  -continue propranolol at 10mg three times daily and use conservative measures to help with POTS  -continue Lexapro at current dose and follow-up with Psychology as scheduled  -proceed with CPET as recommended by cardiology  -follow-up with genetics for concern for EDS  -follow-up with Neurology as scheduled for headaches and tremors  -we will consider OT and PT for physical and cognitive Long COVID  "Rehabilitation after work-up is completed  -additional recommendations provided under \"Patient Education\" section   -> increase B12 supplement to 5000mcg daily    02/2024: sinus complaints, GI complaints, dizziness, palpitations and HR irregularities, pain, anxiety and depression, fatigue, brain fog, headache, tinnitus, smell and taste changes, shortness of breath, cough, chest pain, rashes/skin flushing, numbness and tingling, muscle cramps, tremors, muscle weakness  -comprehensive blood work, please have this drawn in the morning, fasting except for water   -tilt table test to rule out dysautonomia as a cause of your dizziness   -Home Sleep Study to rule out sleep apnea as a cause/contribution to your symptoms   -diaphragmatic breathing exercises  -referral to Cardiologist Dr. Fabian Kam  -restart propranolol at 10mg twice daily, we will consider increasing this dose depending on how you feel   -for anxiety and depression, I prescribed Lexapro 10mg daily and referred you to [facility] Psychologist Nu Fitzpatrick  -for sinus complaints, I referred you to rhinology   -I will reach out to the  neurologist with whom you are scheduled next month to ask if they treat dysautonomia  -> D supplement, B12 supplement  "

## 2024-08-05 ENCOUNTER — TELEMEDICINE (OUTPATIENT)
Dept: OTHER | Facility: CLINIC | Age: 39
End: 2024-08-05
Payer: COMMERCIAL

## 2024-08-05 DIAGNOSIS — F41.9 ANXIETY: ICD-10-CM

## 2024-08-05 DIAGNOSIS — F32.A DEPRESSION, UNSPECIFIED DEPRESSION TYPE: ICD-10-CM

## 2024-08-05 DIAGNOSIS — U09.9 POST-ACUTE SEQUELAE OF COVID-19 (PASC): Primary | ICD-10-CM

## 2024-08-05 PROCEDURE — 99215 OFFICE O/P EST HI 40 MIN: CPT | Mod: 95 | Performed by: NURSE PRACTITIONER

## 2024-08-05 PROCEDURE — 99417 PROLNG OP E/M EACH 15 MIN: CPT | Performed by: NURSE PRACTITIONER

## 2024-08-05 RX ORDER — ONDANSETRON 4 MG/1
4 TABLET, FILM COATED ORAL EVERY 8 HOURS PRN
COMMUNITY
End: 2024-08-05 | Stop reason: WASHOUT

## 2024-08-05 RX ORDER — ESCITALOPRAM OXALATE 20 MG/1
20 TABLET ORAL DAILY
Qty: 90 TABLET | Refills: 0 | Status: SHIPPED | OUTPATIENT
Start: 2024-08-05 | End: 2024-11-03

## 2024-08-05 NOTE — PATIENT INSTRUCTIONS
It was my pleasure seeing you in the [facility] today.  We will focus on addressing the following symptoms discussed today: GI complaints, dizziness, palpitations and HR irregularities, pain, anxiety and depression, fatigue, brain fog, tinnitus, smell and taste changes, shortness of breath, chest pain, rashes/skin flushing, numbness and tingling, muscle cramps, tremors, muscle weakness, weight loss goal    My recommendations are as follows:  -reach out to your GI provider regarding constipation, nausea, and blood in stool. Stop Zofran as this is worsening constipation  -increase Lexapro to 20mg daily for anxiety and depression, follow up with therapy and psychiatry as planned  -continue conservative measures for POTS management, Vitamin B12 supplement, mitochondrial cocktail. Follow up with Dr. Davis as scheduled later this week.    ADHD providers    [email or web address]   [email or web address]   [facility] has an ADHD Center for evaluation  [email or web address]     We will send a message in [employer] or call you with the results of your tests.  Further recommendations will follow based on testing results and your symptoms.   [facility], call [phone] or send a message through your [employer] luiz if needed.    Please also consider attending  PICS support group for long-COVID and post-ICU patients. To contact support group, email [email or web address] or call [phone]

## 2024-08-07 ENCOUNTER — APPOINTMENT (OUTPATIENT)
Dept: NEUROLOGY | Facility: CLINIC | Age: 39
End: 2024-08-07
Payer: COMMERCIAL

## 2024-08-08 ENCOUNTER — DOCUMENTATION (OUTPATIENT)
Dept: OTHER | Facility: CLINIC | Age: 39
End: 2024-08-08
Payer: COMMERCIAL

## 2024-08-08 ENCOUNTER — OFFICE VISIT (OUTPATIENT)
Dept: NEUROLOGY | Facility: HOSPITAL | Age: 39
End: 2024-08-08
Payer: COMMERCIAL

## 2024-08-08 VITALS
DIASTOLIC BLOOD PRESSURE: 85 MMHG | SYSTOLIC BLOOD PRESSURE: 131 MMHG | HEIGHT: 69 IN | HEART RATE: 82 BPM | WEIGHT: 240 LBS | BODY MASS INDEX: 35.55 KG/M2 | RESPIRATION RATE: 16 BRPM

## 2024-08-08 DIAGNOSIS — U09.9 POST-ACUTE SEQUELAE OF COVID-19 (PASC): Primary | ICD-10-CM

## 2024-08-08 DIAGNOSIS — G62.9 SMALL FIBER NEUROPATHY: Primary | ICD-10-CM

## 2024-08-08 PROCEDURE — 99215 OFFICE O/P EST HI 40 MIN: CPT | Performed by: PSYCHIATRY & NEUROLOGY

## 2024-08-08 PROCEDURE — 99417 PROLNG OP E/M EACH 15 MIN: CPT | Performed by: PSYCHIATRY & NEUROLOGY

## 2024-08-08 ASSESSMENT — PAIN SCALES - GENERAL: PAINLEVEL: 6

## 2024-08-08 NOTE — PROGRESS NOTES
[facility] AUTONOMIC PROGRAM         Vanessa Lobo MD    Professor of Neurology  [facility]  Senior Attending Physician- [facility] [place]  Director, Autonomic Program and Laboratories  Medical Director, [employer] [place] [address]  Office: [phone]  Assistant: Adriana Foreman (email: [email or web address])       AUTONOMIC NERVOUS SYSTEM CONSULTATION    Patient Information     Medical Record Number: [id number]   YOB: 1985    Home Address: [address] [place] [address]  Jul[facility]te [facility]   Phone Number:  [phone]      Primary Care Physician: Faustina Soria PA-C    Referring Physician: No referring provider defined for this encounter.    Patient accompanied by: Self  In addition to attending physician, patient seen by: James Mena MD - Roxanne Hummel NP    Clinical Scores    CLINICAL SCORES  Compass 31 : 62  Previous Beighton score 6/9     IMPRESSION:    Rajani Morton is a 39 y.o. y/o right}-handed female with past medical history of COVID-19 infection, hypothyroidism, fibromyalgia, IBS, hypertension presents to the  Autonomic Program for the evaluation of POTS and progressive small fiber neuropathy.    Her examination today is consistent with small fiber neuropathy and patient would benefit from completing EMG/NCS for evaluation of neuropathy/proximal myopathy given her subjective weakness.  Her previous autonomic studies showed a borderline QSART, and patient would benefit from repeating QSART given her progression of symptoms/small fiber neuropathy.  She has been experiencing dry mouth/dry eyes since October 2023 and would benefit from further workup for Sjogren's with biopsy and serum testing.  Additionally, she has subjective weakness that is progressing over time, and patient would benefit from ruling out proximal myopathy with EMG/NCS as well as evaluation for peripheral  neuropathy.    PLAN/RECOMMENDATIONS:   -Referral to ENT for lip biopsy to rule out Sjogren's  -Serum workup: Early Sjogren's antibodies and voltage-gated calcium channel P/Q type and and type antibody panel  -Conservative management of POTS with the followin. Wearing waist-high compression stockings 40-50 mmHg in compression, every day as long she is up and about and removing them at bedtime  2. Drinking one gallon of water a day  3. Eating at least 6 gm of salt (roughly 3 teaspoons) throughout her day.  4. Elevating the head of her bed 45 degrees (not by stacking pillows, but bending her mattress up and stacking bricks underneath).  5. Jogging in water 1 hour every day  - QSART testing  - EMG/NCS of the right upper/right lower extremities to evaluate for peripheral neuropathy/proximal myopathy    HPI    Rajani Morton is a 39 y.o. y/o right}-handed {Race/ethnicity:[id number] female with past medical history of COVID-19 infection, hypothyroidism, fibromyalgia, IBS, hypertension presents to the  Autonomic Program for the evaluation of POTS and progressive small fiber neuropathy.    Of note, she completed autonomic testing 2024 which showed abnormal study due to exaggerated orthostatic tachycardia on tilt table testing, consistent with clinical diagnosis of postural tachycardia syndrome.    Per patient, her main 3 issues are heat intolerance, nausea, and fluctuating heart rate.  All of her symptoms started after 2023 COVID infection.  With regards to her intolerance issue, she gets warm quickly while moving around, which would then make her nauseous and nervous.  She also reported having low heart rate if she is sitting/resting with associated cold sensation.  Prior to 2023, she only had difficulty with overheating for around 4 years.  However, over time, it progressed and has been intolerable over the summertime.  Additionally, she reported that she cannot use compression stocking during  the summertime due to overheating, but will use it during colder weather's.    She had 2 episodes of passing out, but frequent presyncopal episodes when standing up.  She passed out while having a hot shower around mid December 2023, and additionally she had an episode of passing out 2 to 3 weeks prior to her clinic visit as she crawled under her desk to pull a power strip.  When she stood up, she quickly felt lightheaded and went back as she felt that she was going to pass out.  No associated trauma.  Her presyncopal symptoms mainly occur with change in position.  Her heat intolerance makes it worse.  She did not have prior nausea issues to September 2023.  The nausea usually comes with other symptoms, and subsides quickly.  But sometimes it is aggravated to the point where she cannot have her meals, and requires treatment with Zofran.    She hydrates well, and maintain salt intake.  Has not exercised recently due to stamina and feeling overheated.  She takes Flexeril and medical marijuana edible to help her sleep at night.  She was sleep around 7 to 8 hours if medicated, but can only sleep around 2 to 3 hours if not taking any supplementation.  She uses pillows to elevate her in bed, as she shares her bed with her , so unable to elevate the mattress.    She has been spacing out her meals, and noticed improvement.  She has reported ongoing dry eyes and dry mouth around the same timeframe over October 2023.  She is also obtaining genetic testin given comorbidity involving joint hypermobility syndrome.  She has ongoing left elbow joint pain, and weakness all over with associated muscle cramps.  The weakness is now affecting her handedness.  She also has bilateral hip numbness in the anterior lateral proximal thighs for the last 1.5 years.  She has sensory abnormalities involving the sole of the foot bilaterally during the last 2 years as well as having heat sensation in the right foot for the last 3 days that  is atypical for her.  Overall, she feels like there is more numbness involving the bilateral big toes that started around 2 years ago and has been progressing slowly to affect her foot.    In the past, she trialed metoprolol but was discontinued due to orthostasis.  She is now on propranolol 10 mg 3 times daily and tolerating it well.    History details     Relevant Past Medical History:  COVID-19 in December 2020, December 2021, Spring 2022, September 2023, hypothyroidism, endometriosis, fibromyalgia, POTS, HTN, migraines, obesity, IBS, presents for evaluation in the autonomic clinic of Dr. Davis for [id number] POTS     Relevant Past Surgical History:  Past Surgical History:   Procedure Laterality Date    HYSTERECTOMY      1 ovary left    LABYRINTHECTOMY      OTHER SURGICAL HISTORY  01/07/2020    Laparoscopic hysterectomy    RENAL BIOPSY        Relevant Family History:   Mother-celiac disease  Sister-Hashimoto's, and autoimmune hepatitis    AUTONOMIC REVIEW OF SYSTEMS    COMPASS 31 for research purposes only (see below)  Point Value Question Answer Options   1 1. In the past year, have you ever felt faint, dizzy, “goofy”, or had difficulty thinking soon after standing up from a sitting or lying position? Yes   3 2. When standing up, how frequently do you get these feelings or symptoms? Almost Always   2 3. How would you rate the severity of these feelings or symptoms? Moderate   1 4. In the past year, have these feelings or symptoms that you have experienced: Stayed about the same   7     28     0 5. In the past year, have you ever noticed color changes in your skin, such as red, white, or purple? (If you answer no, please skip to question 8) No   0 6. What parts of your body are affected by these color changes?    0 7. Have these changes in your skin color:    0     0     1 8. In the past 5 years, what changes, if any, have occurred in your general body sweating? I sweat much more than I used to   0 9. Do  your eyes feel excessively dry? No   1 10. Does you mouth feel excessively dry? Yes   2 11. For the symptom of dry eyes or dry mouth that you have had for the longest period of time, has this symptom: Gotten somewhat worse   4     9     2 12. In the past year, have you noticed any changes in how quickly you get full when eating a meal? I get full a lot more quickly now than I used to   2 13. In the past year, have you felt excessively full or persistently full (bloated feeling) after a meal? A lot of the time   0 14. In the past year, have you vomited after a meal? Never   2 15. In the past year, have you had a cramping or colicky abdominal pain? A lot of the time   1 16. In the past year, have you had any bouts of diarrhea? (If you answer no, please skip to question 20) Yes   2 17. How frequently does this occur? Frequently   2 18. How severe are these bouts of diarrhea? Moderate   2 19. Have your bouts of diarrhea gotten: Gotten somewhat worse   1 20. In the past year, have you been constipated? (If you answer no, please skip to question 24) Yes   2 21. How frequently are you constipated? Frequently   3 22. How severe are these episodes of constipation? Severe   2 23. Has your constipation gotten: Gotten somewhat worse   21     19     0 24. In the past year, have you ever lost control of your bladder function? Never   2 25. In the past year, have you had difficulty passing urine? Frequently   2 26. In the past year, have you had trouble completely emptying your bladder? Frequently   4     4     2 27. In the past year, without sunglasses or tinted glasses, has bright light bothered your eyes? (If you santo never, please skip to question 29) Frequently   2 28. How severe is this sensitivity to bright light? Moderate   0 29. In the past year, have you had trouble focusing your eyes? (If you santo never, please skip to question 31) Never   0 30. How severe is this focusing problem?    2 31. Has the most troublesome  symptom with your eyes (i.e. sensitivity to bright light or trouble focusing) gotten: Gotten somewhat worse   6     [id number]          TOTAL     62 /100      Additional Autonomic Review of Systems    Genitourinary    a. Erectile dysfunction N/A  b. Ejaculation dysfunction N/A  c. Vaginal dryness Yes  d. Difficulty climaxing N/A    Autoimmune symptoms    a. Chronic joint pain Yes  b. Chronic skin disease No  c. Chronic muscle pain Yes    GENERAL EXAMINATION    Overall appearance: NAD, well-nourished, well-kempt, and pleasant  Carotid bruits: N/A  Extremities: normal    NEUROLOGICAL EXAMINATION    A. Cognition and Language  1. Patient is alert, oriented to time, place and persons  2. Language normal  3. Dysarthria: not present  4. Memory: no apparent deficit  5. MMSE: N/A    B. Cranial Nerves  1. Fundi: not examined  2. Olfaction: not tested  3. Eye movements: EOMI   4. Pupils: PERRLA  5. Facial sensation: normal  6. Facial motor: normal  7. Hearing: normal bilaterally  8. Palate: elevates symmetrically bilaterally  9. SCM: normal strength  10. Tongue: midline    C. Motor examination (MRC [0 to 5] or modified MRC [pluses and minuses] classification)  1. Neck flexion :   2. Neck extension:   3. Eye closure:      4. Lip closure:      5. Shoulder elevation:   Right  normal Left: normal  6. Deltoids:    Right:  5/5  Left: 5/5  7. Biceps:    Right: 5/5  Left: 5/5  8. Triceps:    Right: 5/5  Left: 5/5  9. Forearm pronation:   Right: 5/5 Left: 5/5  10. Forearm supination:   Right:  5/5 Left: 5/5  11. Wrist flexion:    Right: 5/5 Left: 5/5  12. Wrist extension:   Right : 5/5 Left: 5/5  13. Intrinsic Hand Muscles:  Right:  5/5 Left: 5/5  14.  Hip flexion   Right: 5/5 Left: 5/5  15.  Hip extension   Right: 5/5 Left: 5/5  16.  Knee flexion   Right: 5/5 Left: 5/5  17. Knee extension  Right: 5/5 Left: 5/5  18. Dorsiflexion   Right: 5/5 Left: 5/5  19.  Plantar flexion   Right: 5/5 Left: 5/5  20. Toe extension   Right: 5/5 Left:  5/5  21. Toe flexion   Right: 5/5 Left: 5/5      D. Sensory examination  1. In the Lower Extremity    a. There is a sensory gradient to pinprick, distal to proximal to approximately the Mid foot    b. There is a vibration perception gradient distal to proximal (based on a Ovonyx C-64Hz tuning fork)    i. At the toe: present but less than proximal     ii. At the MM: present    iii. At the TT: present   c. Joint position sense intact at the toe  2. In the upper extremity: gradient in gloves distribution: YES ; NO. Up to:     E. Reflexes (NINDS classification 0 to 4)    Biceps:   Right 1 Left 1  Triceps:   Right 1 Left 1  BR:    Right + Left +  Knee:    Right 2 Left 2  Ankles:   Right 2 Left 2    F. Coordination    F to N: Normal  H to S:  NOMAN:   FFM: Normal    G. Gait    Patient felt lightheaded upon standing-up from supine position: Yes    Normal gait  Normal base  Heel walking: Right Normal Left   Normal  Tiptoeing:  Right Normal Left   Normal  Tandem: Normal  Romberg test: Normal    I spent 90 minutes with the patient, at least 50% of which were dedicated to education and detailing diagnostic plans and management.      Vanessa Lobo MD

## 2024-08-08 NOTE — ASSESSMENT & PLAN NOTE
08/2024:  Visit in the autonomic clinic with Dr. Davis for post-COVID POTS showed concern for a possible underlying autoimmune etiology given sicca symptoms, progressive sensation changes, family history of autoimmune disease. Dr. Davis recommends the following next steps:  -repeat QSART, if negative then will proceed with TST  -lip biopsy to rule in/out Sjogren's  -additional bloodwork ordered by Dr. Davis, also repeat Vitamin B12 level   -continue mitochondrial cocktail  -pn/myopathy EMG  -skin biopsy to rule in/out small fiber neuropathy  -continue to focus on conservative measures    08/2024:  GI complaints, dizziness, palpitations and HR irregularities, pain, anxiety and depression, fatigue, brain fog, tinnitus, smell and taste changes, shortness of breath, chest pain, rashes/skin flushing, numbness and tingling, muscle cramps, tremors, muscle weakness, weight loss goal  -reach out to your GI provider regarding constipation, nausea, and blood in stool. Stop Zofran as this is worsening constipation  -increase Lexapro to 20mg daily for anxiety and depression, follow up with therapy and psychiatry as planned  -resources for ADHD testing provided  -continue conservative measures for POTS management, Vitamin B12 supplement, mitochondrial cocktail. Follow up with Dr. Davis as scheduled later this week.    04/2024:   sinus complaints, GI complaints, dizziness, palpitations and HR irregularities, pain, anxiety and depression, fatigue, brain fog, headache, tinnitus, smell and taste changes, shortness of breath, cough, chest pain, rashes/skin flushing, numbness and tingling, muscle cramps, tremors, muscle weakness, weight loss goal  -repeat Vitamin D and B12 level in 2-3 weeks  -smell retraining therapy   -you can send paperwork for intermittent FMLA to [email or web address] or Fax Number [phone]   -try [employer] to help further with LPR  -reach out to GI about stool  "changes  -discuss ADHD treatment option with your PCP  -consultation with Dr. Davis in autonomic clinic will be scheduled on June 13th  -start mitchondrial cocktail, this will be emailed to you as well  -continue propranolol at 10mg three times daily and use conservative measures to help with POTS  -continue Lexapro at current dose and follow-up with Psychology as scheduled  -proceed with CPET as recommended by cardiology  -follow-up with genetics for concern for EDS  -follow-up with Neurology as scheduled for headaches and tremors  -we will consider OT and PT for physical and cognitive [facility] after work-up is completed  -additional recommendations provided under \"Patient Education\" section   -> increase B12 supplement to 5000mcg daily    02/2024: sinus complaints, GI complaints, dizziness, palpitations and HR irregularities, pain, anxiety and depression, fatigue, brain fog, headache, tinnitus, smell and taste changes, shortness of breath, cough, chest pain, rashes/skin flushing, numbness and tingling, muscle cramps, tremors, muscle weakness  -comprehensive blood work, please have this drawn in the morning, fasting except for water   -tilt table test to rule out dysautonomia as a cause of your dizziness   -Home Sleep Study to rule out sleep apnea as a cause/contribution to your symptoms   -diaphragmatic breathing exercises  -referral to Cardiologist Dr. Fabian Kam  -restart propranolol at 10mg twice daily, we will consider increasing this dose depending on how you feel   -for anxiety and depression, I prescribed Lexapro 10mg daily and referred you to [facility] Psychologist Nu Fitzpatrick  -for sinus complaints, I referred you to rhinology   -I will reach out to the  neurologist with whom you are scheduled next month to ask if they treat dysautonomia  -> D supplement, B12 supplement  "

## 2024-08-08 NOTE — PATIENT INSTRUCTIONS
"Mrs. Morton does complain of orthostatic intolerance, which, in the absence of a definite core cause, will need to be treated symptomatically. I would recommend following the \"5 conservative measures\":   1. Wearing waist-high compression stockings 40-50 mmHg in compression, every day as long she is up and about and removing them at bedtime  2. Drinking one gallon of water a day  3. Eating at least 6 gm of salt (roughly 3 teaspoons) throughout her day.  4. Elevating the head of her bed 45 degrees (not by stacking pillows, but bending her mattress up and stacking bricks underneath).  5. Jogging in water 1 hour every day    These measures could be tried strictly for a period of 2 to 3 months, and if they do not bring relief, usage of symptomatic pharmacological treatment would then be justified.   "

## 2024-08-21 ENCOUNTER — CLINICAL SUPPORT (OUTPATIENT)
Dept: AUDIOLOGY | Facility: CLINIC | Age: 39
End: 2024-08-21
Payer: COMMERCIAL

## 2024-08-21 ENCOUNTER — APPOINTMENT (OUTPATIENT)
Dept: OTOLARYNGOLOGY | Facility: CLINIC | Age: 39
End: 2024-08-21
Payer: COMMERCIAL

## 2024-08-21 VITALS
TEMPERATURE: 97.3 F | DIASTOLIC BLOOD PRESSURE: 82 MMHG | BODY MASS INDEX: 37.27 KG/M2 | OXYGEN SATURATION: 98 % | WEIGHT: 252.4 LBS | SYSTOLIC BLOOD PRESSURE: 139 MMHG | HEART RATE: 59 BPM

## 2024-08-21 DIAGNOSIS — R42 DIZZINESS: ICD-10-CM

## 2024-08-21 DIAGNOSIS — M26.609 TMJ DYSFUNCTION: ICD-10-CM

## 2024-08-21 DIAGNOSIS — H93.13 TINNITUS OF BOTH EARS: Primary | ICD-10-CM

## 2024-08-21 DIAGNOSIS — H91.90 DECREASED HEARING, UNSPECIFIED LATERALITY: ICD-10-CM

## 2024-08-21 PROCEDURE — 3079F DIAST BP 80-89 MM HG: CPT

## 2024-08-21 PROCEDURE — 92557 COMPREHENSIVE HEARING TEST: CPT | Performed by: AUDIOLOGIST

## 2024-08-21 PROCEDURE — 1036F TOBACCO NON-USER: CPT

## 2024-08-21 PROCEDURE — 92550 TYMPANOMETRY & REFLEX THRESH: CPT | Performed by: AUDIOLOGIST

## 2024-08-21 PROCEDURE — 99213 OFFICE O/P EST LOW 20 MIN: CPT

## 2024-08-21 PROCEDURE — 3075F SYST BP GE 130 - 139MM HG: CPT

## 2024-08-21 ASSESSMENT — PAIN - FUNCTIONAL ASSESSMENT: PAIN_FUNCTIONAL_ASSESSMENT: 0-10

## 2024-08-21 ASSESSMENT — ENCOUNTER SYMPTOMS: OCCASIONAL FEELINGS OF UNSTEADINESS: 0

## 2024-08-21 ASSESSMENT — PAIN SCALES - GENERAL: PAINLEVEL_OUTOF10: 0 - NO PAIN

## 2024-08-21 NOTE — PROGRESS NOTES
"AUDIOLOGY ADULT AUDIOMETRIC EVALUATION      Name:  Rajani Morton \"Rachana\"  :  1985  Age:  39 y.o.  Date of Evaluation: 24    History:  Reason for visit:  Ms. Morton was seen today as part of the visit with SAMMIE Ly for an evaluation of hearing.   Chief Complaint   Patient presents with    Hearing Problem    Tinnitus    Dizziness     Reported for approximately the past ten years she has experienced some concerns for hearing hearing. Reported she has noticed difficulty hearing compared to others around her. Mentioned she is currently working as an  and has experienced difficulty hearing clients when on the phone. She has a family history of hearing loss with a maternal uncle beginning to wear hearing aids in his early 40s.   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   Mentioned she has experienced constant bilateral non-pulsatile tinnitus for the majority of her lifetime. Stated the tinnitus has been perceived as a ringing sound and at times she believes that the sound may interfere with her communication. She utilizes fans while sleeping to distract herself from the tinnitus. Admitted she has some current feelings of depression, however, does not believe it is due to the tinnitus.  History of dizziness and syncope with collapse, however, she also experiences POTS.   Denied any current otalgia, aural fullness, ear pressure, ear surgery, recent ear/sinus infections, ear drainage, or sudden hearing loss.    EVALUATION     See Audiogram    RESULTS:    Otoscopic Evaluation:   Right Ear: Otoscopy revealed a clear healthy canal and a healthy tympanic membrane was visualized.   Left Ear:  Otoscopy revealed a clear healthy canal and a healthy tympanic membrane was visualized.     Immittance:  Immittance Measures: 226 Hz   Right Ear: Tympanometric testing revealed a normal type A tympanogram with " normal middle ear pressure and normal static compliance.  Left Ear: Tympanometric testing revealed a normal type A tympanogram with normal middle ear pressure and normal static compliance.    Right Ear: Ipsilateral acoustic reflexes were present at, 500-4,000 Hz, at expected sensation levels.  Left Ear: Ipsilateral acoustic reflexes were present at, 500-4,000 Hz, at expected sensation levels.    Test technique:  Pure Tone Audiometry via insert earphones    Reliability:   excellent    Pure Tone Audiometry:    Right Ear: Audiometric testing indicated normal peripheral hearing sensitivity from 125-8,000 Hz.  Left Ear:   Audiometric testing indicated normal peripheral hearing sensitivity from 125-8,000 Hz.      Speech Audiometry:   Right Ear:  Speech Reception Threshold (SRT) was obtained at 15 dBHL                 Word Recognition scores were excellent (100%) in quiet when words were presented at 55 dBHL  Left Ear:  Speech Reception Threshold (SRT) was obtained at 10 dBHL                 Word Recognition scores were excellent (100%) in quiet when words were presented at 50 dBHL  Testing was performed with recorded NU-6 speech words in quiet. Speech thresholds were in good agreement with the pure tone averages in each ear.     Distortion Product Otoacoustic Emissions:  Right Ear: Distortion product otoacoustic emissions were present and robust from 1,000-8,000 Hz, indicating normal to near normal cochlear outer hair cell function at these frequencies.  Left Ear:  Distortion product otoacoustic emissions were present and robust from 1,000-8,000 Hz, indicating normal to near normal cochlear outer hair cell function at these frequencies.  Present OAEs suggest normal cochlear outer hair cell function.  Absent OAEs are consistent with some degree of hearing loss.    IMPRESSIONS:  Today's test results are consistent with normal peripheral hearing sensitivity, bilaterally.  The patient was counseled with regard to the  findings.    RECOMMENDATIONS:  * Continue medical follow up with SAMMIE Ly  * Retest as medically indicated, or sooner if a change in hearing sensitivity or tinnitus is noticed.   * Wear hearing protection while in the presence of loud sounds.   * Use tinnitus coping strategies as needed, such as sound apps on a smart phone, utilizing calming noise in the room, running a fan at night, etc.   * Use effective communication strategies such as asking the speaker to gain attention prior to speaking, speaking in the same room, repeating words that were heard, etc.  * Consider use of T.V. Ears, or like device, while watching television.    PATIENT EDUCATION:   Discussed results and recommendations with the patient.  Questions were addressed and the patient was encouraged to contact our department should concerns arise.  The patient was seen from  9:30-10:00 am.

## 2024-08-21 NOTE — PROGRESS NOTES
"Patient ID: Rajani Morton \"Jared" is a 39 y.o. female who presents for subsequent evaluation for tinnitus in both ears.     PROVIDER IMPRESSIONS:  DIAGNOSES/PROBLEMS:  -Bilateral tinnitus  -TMJ dysfunction    ASSESSMENT:   Rajani Morton is a pleasant 39 y.o. female who presents for subsequent evaluation for bilateral non-pulsatile tinnitus. Based on the clinical information provided, symptoms and clinical exam findings are consistent with TMJ dysfunction. Exam today revealed bilateral TMJ crepitus with jaw-opening maneuver on palpation. Reassurance provided to patient that bilateral EAC appears with no sign of acute infection or inflammation and that bilateral TM appears with no sign of infection, effusion, retraction or perforation. Audiogram was reviewed in detail with the patient today, which revealed normal hearing and middle ear function results throughout. Patient offered reassurance and counseling on the current clinical findings and management recommendations. The etiology of temporomandibular joint disorders (TMD) was discussed in detail with patient including: structural issues such as alterations in dental occlusion (the alignment of the upper and lower teeth) that affect maxillomandibular position and function. These issues may or may not be associated with joint trauma, poor posture or cervicogenic etiologies. Possible psychologic factors include anxiety, depression and PTSD. Multiple other contributing and comorbid factors, including biological, behavioral, environmental, and cognitive disorders which can all contribute to the development of symptoms were also reviewed with the patient.      PLAN:  I recommended implementing the following lifestyle strategies for TMJ symptom management: Initiate a soft diet for the next 2-3 weeks and avoid eating chewy/tough foods such as gum, raw fruits/vegetables, tough meats, or anything else that requires significant mastication. Examples of appropriate soft " "foods include mashed potatoes, soft pasta, macaroni, yogurt, ice cream, and other things may easily be mashed with a fork. Perform temple and cheekbone massages twice daily for several minutes by using your knuckles to gently massage in circles near temples and along your cheekbones as tolerated. Apply a warm/cold compress along the entire side of the affected face, directly over TMJ structures, once or twice daily for 20 minutes at a time and remove sooner if skin irritation occurs. Consider purchase of a custom nighttime mouth guard from a dentist to prevent jaw clenching and/or teeth grinding while asleep. If facial pain is present, may use o.t.c.  ibuprofen 600 mg three times a day for three days only with meals, advised to monitor for side effects of upset stomach and ulcers if ibuprofen is taken on an empty stomach and advised to avoid NSAIDs if previously deemed as contraindicated.   I recommended referral to TMJ physical therapy for further evaluation and management of TMD. Referral e-submitted and patient instructed to contact  Physical Therapy to schedule follow-up.   Follow-up: Patient may schedule for follow-up with me as needed. Patient is agreeable to plan. All questions answered to patient's satisfaction.     Subjective   HPI: Rajani Morton \"Rachana\" is a 39 y.o. female who presents for a follow-up evaluation for tinnitus in both ears. States that tinnitus began throughout lifetime and progressively become more bothersome. Describes tinnitus as a continuous non-pulsatile beeping/tonal sound in both ears. States that tinnitus is most prominent in quiet environments. Mentions that she uses 2 tower fans and white noise machine at nighttime which provides minimal symptom benefit. Reports that she notices that tinnitus is more prominent and also ear pain when she uses her headset for work that covers both ears which she believes is associated with her history of fibromyalgia. States that in adulthood, " "she has noticed mild hearing difficulty in both ears when compared to her peers. Denies any new symptoms of ear itching, ear drainage, ear fullness/pressure, autophony, dizziness and/or vertigo. Mentions a history of habitual bruxism. Patient denies a history of ear surgeries or PE tube placements. 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 Mentions that she is currently in therapy for psychologic management and that she has no childhood memories below the age of 14 yrs. States she also has a history of ADHD but is not currently taking medication.       RECALL 5/31/24:   HPI: Rajani Morton \"Rachana\" is a 38 y.o. female with a history of long-haul COVID who presents virtually for the follow-up evaluation to discuss treatment outcomes for LPR. Since last visit on 3/21/24, the patient reports no change in symptoms of cough, throat clearing, smell distortion, and postnasal drip. Patient recently seen by my rhinology physician partner Dr. Bobby Richard on 5/16/24 for CT sinus imaging results and was prescribed course of p.o. augmentin, she will continue to follow with him for sinonasal symptom management. Patient mentions symptoms of bilateral non-pulsatile tinnitus that has been continuous.   ASSESSMENT: Rajani Morton is a pleasant 38 y.o. female who presents for a virtual follow-up visit to discuss treatment outcomes for LPR. Given that patient symptoms of facial pain/pressure, phantosmia, postnasal drip, throat clearing, coughing, dysphonia, and nasal airway obstruction remain ongoing, I explained to patient that ongoing medical management for LPR will be discontinued and management of chronic sinusitis symptoms will be managed with Dr. Richard. For other presenting symptoms of bilateral non-pulsatile tinnitus, I informed her that audiogram evaluation with follow-up is recommended.   PLAN:  I recommended " discontinuation of p.o. omeprazole 40 mg daily. Medication discontinued in patient AEMR.   I recommended ongoing patient follow-up with my rhinology physician partner Dr. Bobby Richard for recent CT sinus imaging results and sinonasal symptom management of chronic sinusitis/odontogenic sinusitis.  Follow-up: Patient may schedule for follow-up with audiogram for further evaluation of tinnitus in the next 1-4 weeks. Patient is agreeable to this plan, all questions were answered to patient's satisfaction.     RECALL 3/21/24:   HPI: Rajani Morton is a 38 y.o. female who presents for the evaluation of persistent facial pain/pressure, cough, and postnasal drip. Reports history of COVID-19 infection x4 from 9533-6596 and most recently tested positive 6 months ago in September 2023. States that symptoms have remained ongoing since last COVID-19 infection and associated with recurrent sinus infections. In the last 12 months, the patient estimates the number of medically treated sinus infections to be 6-7 infections. The most recent sinus infection was a few months ago. Reports that facial tenderness/pressure is located more toward the right cheek/forehead and associated with headache-like pressure. Reports that cough is non-producive/dry but is incited by sensation of posterior nasal drainage. Mentions she has experienced smell distortion which she attributes to COVID-19 infections, states she continuously notices phantom smells of cigarette smoke as well as taste distortion which remains unchanged. Denies anterior nasal drainage, nasal airway obstruction, sneezing, itching eyes, hoarseness and nasal bleeding. Reports recent increase in heartburn/reflux since last COVID-19 infection and has been using o.t.c. omeprazole 20 mg daily for symptoms. When asked about medications used for sinonasal symptoms, the patient reports current use of flonase with consistent use for the past 3 months. Reports she has used Azelastine  nasal spray consistently for the past 1.5 years with noticeably decreased symptom benefit since last COVID-19 infection in September 2023. The patient reports prior use of netti-pot saline irrigations. When asked about a past medical history of asthma, aspirin sensitivity, migraines, allergies, nasal fracture/trauma, the patient admits to seasonal allergies. States she may experience migraines but has not been confirmed/followed by neurology. The patient denies prior allergy/immunology testing. Patient denies prior sinonasal surgery and denies sinonasal imaging within the past 5 years. Other past medical history includes h/o PASC, hypothyroidism, endometriosis, fibromyalgia, POTS, HTN, migraines, obesity, and IBS.   ASSESSMENT:   Rajani Morton is a pleasant 38 y.o. female with a history of post-acute sequelae of COVID-19 (PASC) referred by provider [facility] for persistent facial pressure/pain, postnasal drip, and chronic nonproductive cough. Associated symptoms include headache and dysosmia secondary to PASC. Nasal endoscopy was performed today and findings detailed in the procedure note below, which revealed mucosa erythematous and swollen with bilateral nasal turbinate hypertrophy. The septum was deviated right. Endoscopy with brief view of larynx that showed hyperemic/edematous mucosa, pyriform sinus mucus accumulation, BOT lingual tonsil hypertrophy. I discussed the findings of the patient and offered reassurance and counseling. No overt evidence of TVC lesions/weakness and no sign of purulent nasal drainage, nasal polyps, nasal lesions/masses, or nasal bleeding. I discussed the findings of the patient and offered reassurance and counseling. Based on the clinical information provided, symptoms and clinical exam findings are most consistent with LPR vs. Recurrent/chronic rhinosinusitis.  I explained to patient that management of LPR with PPI therapy trial for reflux lifestyle  modifications may reduce nasopharyngeal inflammation due to underlying heartburn/reflux etiology and aid in healing mucosal lining. I explained that given the patient's history of recurrent acute sinusitis over the past 12 months (treated 6-7 times), providing maximum medical therapy with empiric antibiotic therapy for sinusitis and subsequent CT sinus imaging to evaluate for sinonasal pathology is the most reasonable approach to determine further recommendations.  PLAN:  I recommended continuing nasal steroid spray  fluticasone (Flonase). I recommended either 2 puffs into each nostril daily, or 1 puff into each nostril twice daily for a consistent trial of at least 4-6 weeks. Patient counseled that this medication is a topical intranasal steroid nasal spray indicated to reduce nasal inflammation. Patient was educated on proper administration of nasal spray, by tilting their head down and pointing the applicator tip towards the lateral wall of the nose/corner of the eye on the same side. I advised patient to avoid pointing applicator toward the septum due to the risk of nasal bleeding.  Patient informed to discontinue the spray if they experience adverse side effects. This medication may be purchased over the counter; a prescription may be sent to the patient's pharmacy upon request.  I recommend continuing use of Azelastine nasal spray: 2 sprays each nostril once a day for better topical allergy control. This medication may be purchased over the counter; a prescription may be sent to the patient's pharmacy upon request.  I recommend the patient restarts daily, consistent intranasal saline irrigations for nasal symptoms. I educated the patient that saline irrigations aid in flushing out residual mucus, crusts, allergens or other environmental irritants in the nasal cavity. I emphasized that this will help clean the nasal cavity PRIOR to use of the medicated intranasal spray. I counseled the patient on appropriate  administration of saline irrigations. I informed the patient that saline irrigation kits may be purchased over the counter.   I recommended an oral antibiotic course of p.o. Doxycycline 100 mg twice daily (every 12 hours) for 14 days. The patient was advised to take sun precautions while on medication. I informed the patient to notify us if they experience abdominal bloating and diarrhea as we may likely change the prescribed medication. I electronically sent the prescription order to the patient's preferred pharmacy on file.   I recommended CT sinus volumetric without contrast for further pathological-anatomical evaluation of the paranasal sinuses. I counseled patient that imaging should be performed shortly after completing a course of oral antibiotics. I also educated patient to continue with recommended maximum medical therapy with topical sprays, irrigations, etc. Patient was provided the requisition order and instructed to contact radiology services to schedule imaging. Patient reassured that I will personally review CT imaging, pending results. Reassurance provided to patient that findings will be discussed at follow-up visit with my office and/or with the referred rhinology physician determine further recommendation.   I counseled the patient on dietary and lifestyle modifications to improve LPR. I recommended limiting or avoiding consumption of spicy, fatty, caffeinated, carbonated, acidic, and citrus foods or beverages that may aggravate reflux. I also recommended limiting or avoiding consumption of chocolate, peppermint, and alcohol.  I recommended eating smaller, more frequent meals and to avoid eating or drinking 2-3 hours before lying down. The patient was also apprised that elevating the head of the bed may help. The patient was counseled on avoiding NSAIDS as much as possible, and if taking them be sure to do so with a meal.  I recommended starting trial of PPI therapy for LPR. Omeprazole 40mg was  prescribed once daily to be taken 30 minutes before meals. I discussed with the patient that this medication can limit gastric acid and subsequently allow for adequate mucosal healing. Patient was informed that it may take up to 3-6 months to notice any therapeutic effect.  I recommended referral to neurology for evaluation and management of possible migraines. Referral e-submitted and patient instructed to call and schedule.   Follow-up: Patient may schedule for virtual follow-up in 8-12 weeks to review CT sinus results and evaluate treatment outcomes. They may follow-up sooner, if needed. Patient is agreeable to this plan, all questions were answered to patient's satisfaction.   PATIENT HISTORY:  Past Medical History:   Diagnosis Date    Allergic     Avascular necrosis of bone (Multi) 2019    both hips, from steroids    Disease of thyroid gland     Fibromyalgia     Hypertension     POTS (postural orthostatic tachycardia syndrome)       Past Surgical History:   Procedure Laterality Date    HYSTERECTOMY      1 ovary left    LABYRINTHECTOMY      OTHER SURGICAL HISTORY  01/07/2020    Laparoscopic hysterectomy    RENAL BIOPSY        Allergies   Allergen Reactions    Morphine Nausea/vomiting        Current Outpatient Medications:     azelastine (Astelin) 137 mcg (0.1 %) nasal spray, Administer 1 spray into each nostril 2 times a day. Use in each nostril as directed, Disp: , Rfl:     cetirizine (ZyrTEC) 10 mg chewable tablet, Chew once daily., Disp: , Rfl:     cyclobenzaprine (Flexeril) 10 mg tablet, Take 1 tablet (10 mg) by mouth 3 times a day as needed for muscle spasms., Disp: , Rfl:     escitalopram (Lexapro) 20 mg tablet, Take 1 tablet (20 mg) by mouth once daily., Disp: 90 tablet, Rfl: 0    fluticasone (Flonase) 50 mcg/actuation nasal spray, Administer 1 spray into each nostril once daily. Shake gently. Before first use, prime pump. After use, clean tip and replace cap., Disp: 16 g, Rfl: 5    ibuprofen 800 mg  tablet, Take 1 tablet (800 mg) by mouth every 6 hours if needed for headaches, mild pain (1 - 3) or moderate pain (4 - 6)., Disp: , Rfl:     levothyroxine (Synthroid, Levoxyl) 50 mcg tablet, Take 1 tablet (50 mcg) by mouth once daily., Disp: , Rfl:     lisinopril 20 mg tablet, Take 1 tablet (20 mg) by mouth once daily., Disp: , Rfl:     propranolol (Inderal) 10 mg tablet, Take 1 tablet (10 mg) by mouth 3 times a day., Disp: 270 tablet, Rfl: 3   Tobacco Use: Medium Risk (8/5/2024)    Patient History     Smoking Tobacco Use: Former     Smokeless Tobacco Use: Never     Passive Exposure: Not on file      Alcohol Use: Not At Risk (7/16/2024)    AUDIT-C     Frequency of Alcohol Consumption: Never     Average Number of Drinks: Patient does not drink     Frequency of Binge Drinking: Never      Social History     Substance and Sexual Activity   Drug Use Yes    Types: Marijuana        Review of Systems   All other systems negative.     Objective   ENT FOCUSED PHYSICAL EXAM:   TMJ: Moderate TMJ crepitus with jaw-opening maneuver on bimanual palpation, no trismus.  Right Ear: External inspection of ear with no deformity, scars, or masses. Mastoid is nontender. External auditory canal is clear.    TM is intact with no sign of infection, effusion, or retraction.  No perforation seen. Auto insufflation visible under microscopy.  Left Ear: External inspection of ear with no deformity, scars, or masses. Mastoid is nontender. External auditory canal is clear.    TM is intact with no sign of infection, effusion, or retraction.  No perforation seen. Auto insufflation visible under microscopy.    RESULTS:  I personally reviewed the patient's audiogram from 08/21/24, which revealed the following results: Normal hearing across all frequencies in bilateral ears. Normal tympanogram bilaterally. Excellent speech discrimination scores bilaterally. Acoustic reflexes present right ipsilateral, and present left ipsilateral. DPOAEs present and  robust bilaterally from 4668-2611 Hz.     Patricia Salgado, APRN-CNP

## 2024-08-22 ENCOUNTER — PATIENT MESSAGE (OUTPATIENT)
Dept: OTHER | Facility: CLINIC | Age: 39
End: 2024-08-22
Payer: COMMERCIAL

## 2024-08-22 DIAGNOSIS — U09.9 POST-ACUTE SEQUELAE OF COVID-19 (PASC): Primary | ICD-10-CM

## 2024-08-26 PROBLEM — G90.A POTS (POSTURAL ORTHOSTATIC TACHYCARDIA SYNDROME): Status: ACTIVE | Noted: 2024-08-26

## 2024-09-03 ENCOUNTER — HOSPITAL ENCOUNTER (OUTPATIENT)
Dept: NEUROLOGY | Facility: HOSPITAL | Age: 39
Discharge: HOME | End: 2024-09-03
Payer: COMMERCIAL

## 2024-09-03 DIAGNOSIS — G62.9 SMALL FIBER NEUROPATHY: ICD-10-CM

## 2024-09-03 PROCEDURE — 95913 NRV CNDJ TEST 13/> STUDIES: CPT | Performed by: PSYCHIATRY & NEUROLOGY

## 2024-09-03 PROCEDURE — 95923 AUTONOMIC NRV SYST FUNJ TEST: CPT | Performed by: PSYCHIATRY & NEUROLOGY

## 2024-09-03 PROCEDURE — 95886 MUSC TEST DONE W/N TEST COMP: CPT | Performed by: PSYCHIATRY & NEUROLOGY

## 2024-09-04 ENCOUNTER — TELEMEDICINE CLINICAL SUPPORT (OUTPATIENT)
Dept: OCCUPATIONAL THERAPY | Facility: HOSPITAL | Age: 39
End: 2024-09-04
Payer: COMMERCIAL

## 2024-09-04 DIAGNOSIS — U09.9 POST-ACUTE SEQUELAE OF COVID-19 (PASC): ICD-10-CM

## 2024-09-04 DIAGNOSIS — G93.32 MYALGIC ENCEPHALOMYELITIS/CHRONIC FATIGUE SYNDROME (ME/CFS): Primary | ICD-10-CM

## 2024-09-04 DIAGNOSIS — R41.89 COGNITIVE DEFICITS: ICD-10-CM

## 2024-09-04 PROCEDURE — 97535 SELF CARE MNGMENT TRAINING: CPT | Mod: GO,95 | Performed by: OCCUPATIONAL THERAPIST

## 2024-09-04 PROCEDURE — 97166 OT EVAL MOD COMPLEX 45 MIN: CPT | Mod: GO,95 | Performed by: OCCUPATIONAL THERAPIST

## 2024-09-04 ASSESSMENT — PATIENT HEALTH QUESTIONNAIRE - PHQ9
8. MOVING OR SPEAKING SO SLOWLY THAT OTHER PEOPLE COULD HAVE NOTICED. OR THE OPPOSITE, BEING SO FIGETY OR RESTLESS THAT YOU HAVE BEEN MOVING AROUND A LOT MORE THAN USUAL: NOT AT ALL
4. FEELING TIRED OR HAVING LITTLE ENERGY: NEARLY EVERY DAY
9. THOUGHTS THAT YOU WOULD BE BETTER OFF DEAD, OR OF HURTING YOURSELF: NOT AT ALL
SUM OF ALL RESPONSES TO PHQ QUESTIONS 1-9: 14
6. FEELING BAD ABOUT YOURSELF - OR THAT YOU ARE A FAILURE OR HAVE LET YOURSELF OR YOUR FAMILY DOWN: NOT AT ALL
2. FEELING DOWN, DEPRESSED OR HOPELESS: SEVERAL DAYS
7. TROUBLE CONCENTRATING ON THINGS, SUCH AS READING THE NEWSPAPER OR WATCHING TELEVISION: NEARLY EVERY DAY
1. LITTLE INTEREST OR PLEASURE IN DOING THINGS: MORE THAN HALF THE DAYS
10. IF YOU CHECKED OFF ANY PROBLEMS, HOW DIFFICULT HAVE THESE PROBLEMS MADE IT FOR YOU TO DO YOUR WORK, TAKE CARE OF THINGS AT HOME, OR GET ALONG WITH OTHER PEOPLE: VERY DIFFICULT
3. TROUBLE FALLING OR STAYING ASLEEP OR SLEEPING TOO MUCH: MORE THAN HALF THE DAYS
5. POOR APPETITE OR OVEREATING: NEARLY EVERY DAY
SUM OF ALL RESPONSES TO PHQ9 QUESTIONS 1 AND 2: 3

## 2024-09-04 ASSESSMENT — ENCOUNTER SYMPTOMS
DEPRESSION: 1
LOSS OF SENSATION IN FEET: 1
OCCASIONAL FEELINGS OF UNSTEADINESS: 1

## 2024-09-04 ASSESSMENT — ACTIVITIES OF DAILY LIVING (ADL): HOME_MANAGEMENT_TIME_ENTRY: 10

## 2024-09-04 NOTE — PROGRESS NOTES
Occupational Therapy    Patient Name:Rajani Morton  MRN:[id number]  Evaluation Date:  2024  Referred by: Roxanne Hummel  Time Calculation  Start Time: 1508  Stop Time: 1621  Time Calculation (min): 73 min    Therapy Diagnosis  Problem List Items Addressed This Visit             ICD-10-CM    Fatigue - Primary R53.83    Post-acute sequelae of COVID-19 (PASC) U09.9    Cognitive deficits R41.89       Insurance  Visit number: 1   Approved number of visits: [employer] 3400 DED-MET, 80/20 COVERAGE, 60 OT/PT/ST VISITS COMBINED, NO AUTH REQ, JOSUE #[id number]  Auth required: No  Authorization date range:   Onset Date: 2023  Medicare Certification Period:  Beginnin/4/2024            Endin2024  Payor: JOSUE / Plan: [employer] HE[employer] PLAN / Product Type: *No Product type* /     Precautions/Fall Risk:  Fall Risk: High based on STEADI   Pacemaker: no    Seizures: No      SUBJECTIVE  Chief complaint/reason for visit: cognitive deficits and fatigue    Pt. Reported: Long COVID symptoms started: 2023  POTS started on around the time after COVID 2023.   BP readings were high then low, stats were fluctuating when just standing from seated positions.   Pt. Reported falling in the shower after passing out, rotated foot but was not broken.   Went off two medications, Psych switched her ADHD and antidepressant medications.    Location of Pain: yes, everywhere severe joints , both hips.   Current Pain Level (0-10): 6    Pain Quality: aching, burning, dull, pressure, sharp, spasm, tightness, throbbing, and unable to describe  Pain Exacerbating Factors: overuse  Pain Relieving Factors: nothing relieves the pain    Ambulatory Screenings:     Telemedicine Clinical Support from 2024 in [facility] with Cecile Dennis, OT     2024    1512   Over the past 2 weeks, how often have you been bothered by any of the following problems?     Unable to screen due to: --   Little interest or pleasure  in doing things More than half the days   Feeling down, depressed, or hopeless Several days   Patient Health Questionnaire-2 Score 3   Over the past 2 weeks, how often have you been bothered by any of the following problems?     Trouble falling or staying asleep, or sleeping too much More than half the days   Feeling tired or having little energy Nearly every day   Poor appetite or overeating Nearly every day   Feeling bad about yourself - or that you are a failure or have let yourself or your family down Not at all   Trouble concentrating on things, such as reading the newspaper or watching television Nearly every day   Moving or speaking so slowly that other people could have noticed? Or the opposite - being so fidgety or restless that you have been moving around a lot more than usual. Not at all   Thoughts that you would be better off dead or hurting yourself in some way Not at all   Patient Health Questionnaire-9 Score 14   If you checked off any problems on this questionnaire so far,     How difficult have these problems made it for you to do your work, take care of things at home, or get along with other people? Very difficult     STEADI: 12, high falls risk      Medical Screening: Reviewed medical history form with patient and medical screening assessed.   Red Flags: dizziness/fainting, unexplained change in bowel or bladder functions, unexplained malaise or muscle weakness, and night pain/sweats, medical team is aware and addressing these issues.    Prior medical History:  39 y.o. female with a h/o COVID-19 in December 2020, December 2021, Spring 2022, September 2023, hypothyroidism, endometriosis, fibromyalgia, POTS, HTN, migraines, obesity, and IBS.     Current Medical Management: working with COVID clinic and working with psychology else where    Prior Level of Function (PLOF)  Activities of Daily Living prior to onset  Bathing independent   Dressing independent  Eating independent  Grooming  independent  Toileting independent    Instrumental Activities of Daily Living prior to onset  Homemaking: independent  Meal preparation: independent  Medication management: independent  Leisure interests: Dad drag races, enjoys going to the race track.     Work Status prior to onset: full time job doing    Current Status: worsened   Patient Awareness: Patient is aware of  her diagnosis and prognosis.   Living Environment: [facility]  Social Support: spouse / significant other  Personal Factors That May Impact Care: transportation  Driving, pt feels unsafe due to dizziness.   Worked at [employer] prior    Objective  Outcome Measures:       09/04/24 1600   OT Adult Other Outcome Measures   Other Outcome Measures Modified Fatigue Impact Scale:  70/84 points  Physical: 31/36  Cognitive: 31/40  Pyschosocial: 6/8.           [facility] Cognitive: 54 raw, 41.68%     Current level of function:  Activities of Daily Living current performance  Bathing mod I, uses a shower chair, without she would need assistance Bathing independent uses a shower chair, without she would need assistance  Dressing independent  Eating independent  Grooming independent Pt. Reported exhaustion and BUE weakness with hair washing/combing  Toileting independent    Instrumental Activities of Daily Living current performance  Homemaking: minimal assist   Meal preparation: mod assist Can't get through IADL,  now helps with cooking. Pt. Is requiring seated tasks.  Medication management: independent Pt. Uses alarms to remember medication dosage times but forgets to take on time.   Bills: Pt. Reports being late on bills at times due to impaired memory  Leisure interests: Enjoys cooking, being with family.  Father in law lives on the other side of their duplex and spends time together. Watches nuPSYS, Star Wars, [place] sports teams, reading is hard but likes to read. Spending timew with Mr. Lopez (Cat).    ROM/Muscle strength:    BUE shoulder flexion  "causes fatigue   LE: Pt. Reported when going up/down the stairs twice in an hour it will feel like a workout the day before.   Hair brushing can't lift arms, \"burning pain at the end of a work out\"  Pt said that she is being tested for EDS hypermobility.    Cognition     Impaired attention  Pt. noticed there were problems with not knowing what she was doing at work. Pt. Reported customers were aware of her difficulties. Pt. Declined her promotion at work due to cognitive difficulties. Pt. Recalled unable to learn new material required for her job from the last 8 months.    Memory  Impaired working memory: difficulty with steps in cooking  Pt. Is easily distracted and finds performing tasks in her home to be difficult. Pt. Gave example of needing to go up/down her stairs 4X to mix her salt water    Difficulty with:  -Processing speed  -Executive functions  -Reading  -Word finding      Fatigue:   Sleep habits average: 8.5 hours of sleeps  Naps daily: most days 1-4 hours  Symptoms: whole body weakness/tiredness. Feels brain fog 24/7 but gets worse.   Today 7/10 fatigue scale.     Vision:   Wears glasses is nearsighted, blurry double vision, strains to see.     Hearing: had hearing tests, passed. Pt reports TMJ and tinnitus. Sound machines/head phones do not help, makes it worse.     Work:   Work Status: part time,  works from home.   Concerns: cognitive abilities ave impacted work.  Only able to return to work part time, on medical leave currently due to difficulties when return to work was attempted.     Assessment: Pt. Is a 38 y/o female referred to OT for cognitive deficits and fatigue due to Long COVID. Pt. Is having difficulty performing ADL, IADL, work, and leisure. Pt. Would benefit from skilled OT services to address these deficits.    Planned interventions include prn: IADL retraining, energy conservation techniques, instruction on compensatory tools and techniques, work retraining, " "cognitive retraining.    Problem List: Impaired IADL, work, cognition, sleep, fatigue, strength, activity tolerance, vision,       Rehab Potential:good  Clinical Presentation: Unstable and unpredictable characteristics   Evaluation Complexity: moderate      PLAN of Care  Frequency and duration: 1 time a week, for  12 weeks or 12 visits .   Plan of care was developed with input and agreement by the patient.   Goals: Goals set and discussed today.     Patient's Goal for Treatment:   \"To be better\"  \"Able to operate 75% of baseline.\"   Managing fatigue    Active       OT Problem       Patient will improve BUE strength to allow hair combing/washing with minimal complaints of fatigue or weakness.       Start:  09/04/24    Expected End:  11/29/24            Patient will complete complex IADL independently using cognitive and fatigue management strategies        Start:  09/04/24    Expected End:  11/29/24            OT-Patient will demonstrate good recall and understanding of written material through use of PQRST and other reading strategies.        Start:  09/04/24    Expected End:  11/29/24            OT- Patient will use memory and planning system independently as a compensatory tool for management of day, medication, and memory       Start:  09/04/24    Expected End:  11/29/24            Patient will complete work tasks independently using compensatory tools/techniques and energy conservation techniques.       Start:  09/04/24    Expected End:  11/29/24            OT-Patient will manage fatigue independently through use of fatigue traffic light and other fatigue strategies to allow participation in ADL/IADL/leisure with fatigue rating of 4/10(yellow) or less.        Start:  09/04/24    Expected End:  11/29/24            Patient will demonstrate sustained attention adequate for conversations and work tasks.       Start:  09/04/24    Expected End:  11/29/24            Patient will independently use compensatory tools for " executive function during complex IADL and work tasks.        Start:  09/04/24    Expected End:  11/29/24                Treatment/Education/Resources Provided Today: Initial evaluation, instruction regarding home exercise program and patient education regarding diagnosis, prognosis, contributing factors, and role of OT. Pt instructed on use of fatigue tracker and fatigue scale.     Response to Treatment:  receptive and cooperative to instruction, agreeable to HEP, willing to learn      Plan for next session:  Begin instruction to energy conservation techniques, address new learning using Errorless learning, and Forward/backward chaining.    Home Program/Medbridge: Pt. Will complete fatigue tracker using fatigue scale min. 3X per day and return prior to OT session.    Billing:  Evaluation: 63 mod eval   ADL/IADL: 10

## 2024-09-05 ENCOUNTER — LAB (OUTPATIENT)
Dept: LAB | Facility: LAB | Age: 39
End: 2024-09-05
Payer: COMMERCIAL

## 2024-09-05 DIAGNOSIS — G62.9 SMALL FIBER NEUROPATHY: ICD-10-CM

## 2024-09-05 DIAGNOSIS — U09.9 POST-ACUTE SEQUELAE OF COVID-19 (PASC): ICD-10-CM

## 2024-09-05 LAB — VIT B12 SERPL-MCNC: 350 PG/ML (ref 211–911)

## 2024-09-05 PROCEDURE — 82607 VITAMIN B-12: CPT

## 2024-09-05 PROCEDURE — 86596 VOLTAGE-GTD CA CHNL ANTB EA: CPT

## 2024-09-05 PROCEDURE — 83520 IMMUNOASSAY QUANT NOS NONAB: CPT

## 2024-09-05 PROCEDURE — 36415 COLL VENOUS BLD VENIPUNCTURE: CPT

## 2024-09-06 DIAGNOSIS — R51.9 NONINTRACTABLE HEADACHE, UNSPECIFIED CHRONICITY PATTERN, UNSPECIFIED HEADACHE TYPE: Primary | ICD-10-CM

## 2024-09-10 ENCOUNTER — TELEMEDICINE CLINICAL SUPPORT (OUTPATIENT)
Dept: OCCUPATIONAL THERAPY | Facility: HOSPITAL | Age: 39
End: 2024-09-10
Payer: COMMERCIAL

## 2024-09-10 DIAGNOSIS — R41.89 COGNITIVE DEFICITS: ICD-10-CM

## 2024-09-10 DIAGNOSIS — U09.9 POST-ACUTE SEQUELAE OF COVID-19 (PASC): Primary | ICD-10-CM

## 2024-09-10 DIAGNOSIS — G93.32 MYALGIC ENCEPHALOMYELITIS/CHRONIC FATIGUE SYNDROME (ME/CFS): ICD-10-CM

## 2024-09-10 PROCEDURE — 97535 SELF CARE MNGMENT TRAINING: CPT | Mod: GO | Performed by: OCCUPATIONAL THERAPIST

## 2024-09-10 ASSESSMENT — ACTIVITIES OF DAILY LIVING (ADL): HOME_MANAGEMENT_TIME_ENTRY: 84

## 2024-09-10 NOTE — PROGRESS NOTES
"Occupational Therapy    Occupational Therapy Treatment    Name: Rajani Morton \"Rachana\"  MRN: [id number]  : 1985  Date: 09/10/24    Time Entry: 1600       Assessment:Pt. Is a 40 y/o female referred to OT for cognitive deficits and fatigue due to Long COVID. Pt. Is having difficulty performing ADL, IADL, work, and leisure. Pt. Would benefit from skilled OT services to address these deficits.      Plan:   Frequency and duration: 1 time a week, for  12 weeks or 12 visits .   Plan of care was developed with input and agreement by the patient.   Insurance  Visit number: 2   Approved number of visits: [employer] 3400 DED-MET, 80/20 COVERAGE, 60 OT/PT/ST VISITS COMBINED, NO AUTH REQ, [facility] #[id number]  Auth required: No  Authorization date range:   Onset Date: 2023  Medicare Certification Period:  Beginnin/4/2024            Endin2024  Payor: JOSUE / Plan: [employer] HEALTH PLAN / Product Type: *No Product type* /     Subjective   General:  OT Last Visit  OT Received On: 09/10/24Memory retaining new information isn't sticking. Mid conversations: mother took her for lunch once every 5-10 minutes completely forgot what she was talking about.     Pt reported having 4 appointments last week, Tuesday. Was exhausted Friday/Saturday felt dehydrated. Started new medication stopped an old medication. Pt. Reported sheis learning to pace herself. Pt. Is starting PT next week for TMJ.  Fatigue better right now, still severe. Fatigue 6 or 7  Sleep last night:  didn't get home until 1130- slept to 8:45 am    Precautions: High fall risk     Pain Assessment: Pain level sitting not moving around 3. Up and moving a 7. Heat/ice no modality. Can't do heat, at all makes her sick.        Objective       IADL's:   Pt. Uses a calendar for planning appointments. Pt. Was given examples of using a planner to keep track of daily activities and future activities to improve memory and IADLs.     Pt participated in the task \"Get " "Ready Do Done and was able to identify steps to a task she wants to complete but has been exhausted.    Pt listed out steps   -take things down  -clean  -Put fall items out    Pt. Get ready  What do I need?  1.Take things down/dust  Gather Boxe(s)  Polishing, dusters, swifter  2. Clean   Dusting, swifter, organize cleaning supplies.   3. Put the fall items out.   Boxes, from the basement.  will help. Garage   3-4 hours.     Pt reported ways to making the task simplified.   -Ask  to help bring up boxes from down stairs and boxes from the garage.  -Taking short breaks between taking items out  -break it up into multiple days.    Pt. participated in an activity to help identify fatigue levels to prevent over exhaustion.   Traffic Light Activity  Notices fatigue in Feet then up legs  -Tingling/numbness in feet all the time. When done too much, pain in the ball of the foot with every step. 3-4 months ago starting.   Notices her body feels heaviness, harder to move and slowly  moves up.   Mood: pain stopping ends up bothering her, shutting down, others can tell something is wrong.  Senses: feels sad, shuts down from others  Others see \"tired eyes, sick eyes.\" Father in law notices \"looks tired\"    Red:   Moving and Thinking: \"feeling drunk\"  Word finding: troubled  Reading: not retaining the information  Vision: unsure during fatigue. Blackness is increased   Auditory: 24/7 tinnitus gets louder  Response time is slower  Body feels: heavy  Pain increased.   Temp: feeling overheated, sweating  Around others: short tempered, no patience  Nauseous: wants to throw up  Communication: shuts down around others     Yellow:  Thinking: \"knows when getting close, pain is starting to get worse\"  Word finding is harder tends to push through happens more. Sometimes she can find the word but cannot come to the word.    Reading: reading but misunderstanding. Not connecting. Recipe reading messes up. Not being sure of self " having to recheck  Vision: Any movement up and down feels blackness coming on based on POTs starts.   Body: feet/hands and feet are beginning to feel heavy/stiffer. Arm muscles are beginning to start hurting  Fine motor skills: New symptom she is noticing now are her fine motor skills feel to be declining. Small tasks that were once easy are now harder. For example painting her toenails or applying press on nails.    Green:  Thinking: sharper, quicker, wakes up feeling like nothing is wrong.   Word finding: easier find other words to compensate  Reading: easier  Vision: doesn't recall vision problems be increased  Auditory: tinnitis is better when doing what the ENT dr said to do.         Vision: Pt is noticing blackness feeling coming on when any movement up and down based on POTs symptoms.      OP EDUCATION: Pt. Was given tools for identifying fatigue levels, fatigue log to log the levels, and planner pages to help with memory in tracking events and tasks to complete.       Goals:  Active       OT Problem       Patient will improve BUE strength to allow hair combing/washing with minimal complaints of fatigue or weakness.       Start:  09/04/24    Expected End:  11/29/24            Patient will complete complex IADL independently using cognitive and fatigue management strategies        Start:  09/04/24    Expected End:  11/29/24            OT-Patient will demonstrate good recall and understanding of written material through use of PQRST and other reading strategies.        Start:  09/04/24    Expected End:  11/29/24            OT- Patient will use memory and planning system independently as a compensatory tool for management of day, medication, and memory       Start:  09/04/24    Expected End:  11/29/24            Patient will complete work tasks independently using compensatory tools/techniques and energy conservation techniques.       Start:  09/04/24    Expected End:  11/29/24            OT-Patient will manage  fatigue independently through use of fatigue traffic light and other fatigue strategies to allow participation in ADL/IADL/leisure with fatigue rating of 4/10(yellow) or less.        Start:  09/04/24    Expected End:  11/29/24            Patient will demonstrate sustained attention adequate for conversations and work tasks.       Start:  09/04/24    Expected End:  11/29/24            Patient will independently use compensatory tools for executive function during complex IADL and work tasks.        Start:  09/04/24    Expected End:  11/29/24              OT Student under direct supervision by OTR/L

## 2024-09-11 LAB — SCAN RESULT: NORMAL

## 2024-09-13 NOTE — PROGRESS NOTES
Patient reports worsening headaches, new onset weakness and loss of hand dexterity along with significant worsening of cognitive/memory complaints. Will proceed with MRI of brain for further evaluation.

## 2024-09-16 LAB — SCAN RESULT: NORMAL

## 2024-09-23 ENCOUNTER — APPOINTMENT (OUTPATIENT)
Dept: OTOLARYNGOLOGY | Facility: CLINIC | Age: 39
End: 2024-09-23
Payer: COMMERCIAL

## 2024-09-23 VITALS — SYSTOLIC BLOOD PRESSURE: 144 MMHG | BODY MASS INDEX: 37.27 KG/M2 | DIASTOLIC BLOOD PRESSURE: 101 MMHG | HEIGHT: 69 IN

## 2024-09-23 DIAGNOSIS — G62.9 SMALL FIBER NEUROPATHY: ICD-10-CM

## 2024-09-23 DIAGNOSIS — M35.0C SJOGREN'S SYNDROME WITH DENTAL INVOLVEMENT: Primary | ICD-10-CM

## 2024-09-23 PROCEDURE — 3080F DIAST BP >= 90 MM HG: CPT | Performed by: OTOLARYNGOLOGY

## 2024-09-23 PROCEDURE — 3077F SYST BP >= 140 MM HG: CPT | Performed by: OTOLARYNGOLOGY

## 2024-09-23 PROCEDURE — 99213 OFFICE O/P EST LOW 20 MIN: CPT | Performed by: OTOLARYNGOLOGY

## 2024-09-23 PROCEDURE — 1036F TOBACCO NON-USER: CPT | Performed by: OTOLARYNGOLOGY

## 2024-09-23 NOTE — PROGRESS NOTES
"History Of Present Illness  Rajani Morton \"Rachana\" is a 39 y.o. female presenting with suspected Sjogren's patient was referred by .    Patient was referred by Dr. Davis for suspicion of Sjogren's syndrome.  Sjogren's labs were obtained.  They were mostly normal, borderline.  Patient reports significantly dry eyes, nose and mouth.     Past Medical History  She has a past medical history of Allergic, Avascular necrosis of bone (Multi) (2019), Disease of thyroid gland, Fibromyalgia, Hypertension, and POTS (postural orthostatic tachycardia syndrome).    Surgical History  She has a past surgical history that includes Other surgical history (01/07/2020); Hysterectomy; Labyrinthectomy; and Renal biopsy.     Social History  She reports that she has quit smoking. Her smoking use included cigarettes. She has never used smokeless tobacco. She reports current alcohol use. She reports current drug use. Drug: Marijuana.    Family History  Family History   Problem Relation Name Age of Onset    No Known Problems Mother          Allergies  Morphine    Review of Systems   Constitutional: Negative.    HENT: Negative.     Eyes: Negative.    Respiratory: Negative.     Cardiovascular: Negative.    Gastrointestinal: Negative.    Endocrine: Negative.    Genitourinary: Negative.    Musculoskeletal: Negative.    Skin: Negative.    Allergic/Immunologic: Negative.    Neurological: Negative.    Hematological: Negative.    Psychiatric/Behavioral: Negative.     These systems were reviewed and are negative unless otherwise stated in the HPI       Physical Exam    Constitutional   General appearance: Healthy-appearing, well-nourished, well groomed, in no acute distress.      Voice  Ability to communicate: Normal communication without aids, normal voice quality    Head and Face   Head and face: Atraumatic with no masses, lesions, or scarring.    Salivary glands: No tenderness of the parotid glands or parotid masses. No tenderness of the " "submandibular glands or submandibular masses.    Facial strength: Normal strength and symmetry, no synkinesis or facial tic.     Eyes   Pupils and irises: EOM intact, PERRLA, conjunctiva non-injected.     Ears  Otoscopic examination: Auditory canals with normal appearance and no obstruction or erythema. Membranes mobile per pneumatic otoscopy, pearly grey, with clear landmarks. No evidence of middle ear effusion.  External inspection of ears: Ears normally formed and free of lesions.     Nose   External inspection of nose: Dorsum symmetric with no visible or palpable deformities.  No nasal lesions, lacerations, or scars. Nasal tip symmetrical with normal nasal valves.    Nasal mucosa, septum, and turbinates: Mucosa normal, pink and moist. Septum not markedly deformed. Turbinates normal in size and confrontation. No polyps.     Oral Cavity/Mouth   Lips, teeth, and gums: Normal lips, gums.  Dentition in poor repair    Throat   Oropharynx: Mucosa moist, no lesions. Hard and soft palate normal. Tongue normal, no lesions or edema. No tonsillar masses or lesions. Normal tongue base.    Neck   Neck: Symmetrical, trachea midline.    Thyroid symmetrical, no enlargement, no tenderness, no nodules.     Pulmonary   Respiratory effort: Chest expands symmetrically. No audible wheeze.      Cardiovascular   Peripheral vascular system: No varicosities, carotid pulse normal, no edema. No jugular venous distension    Lymphatic   Palpation of cervical lymph nodes: No palpable lymph node enlargement, no submandibular adenopathy, no anterior cervical adenopathy, no supraclavicular adenopathy    Neurological/Psychiatric   Cranial nerves: Cranial nerves II - XII intact. Normal gait. No nystagmus  Orientation to person, place, and time: Normal.     Mood and affect: Normal.      Extremities   Appearance of extremities: Normal.               Last Recorded Vitals  Blood pressure (!) 144/101, height 1.753 m (5' 9\").    Relevant Results  Prior " to Admission medications    Medication Sig Start Date End Date Taking? Authorizing Provider   azelastine (Astelin) 137 mcg (0.1 %) nasal spray Administer 1 spray into each nostril 2 times a day. Use in each nostril as directed    Historical Provider, MD   cetirizine (ZyrTEC) 10 mg chewable tablet Chew once daily.    Historical Provider, MD   cyclobenzaprine (Flexeril) 10 mg tablet Take 1 tablet (10 mg) by mouth 3 times a day as needed for muscle spasms.    Historical Provider, MD   fluticasone (Flonase) 50 mcg/actuation nasal spray Administer 1 spray into each nostril once daily. Shake gently. Before first use, prime pump. After use, clean tip and replace cap. 2/5/24 2/4/25  Faustina Soria PA-C   ibuprofen 800 mg tablet Take 1 tablet (800 mg) by mouth every 6 hours if needed for headaches, mild pain (1 - 3) or moderate pain (4 - 6). 7/18/20   Historical Provider, MD   levothyroxine (Synthroid, Levoxyl) 50 mcg tablet Take 1 tablet (50 mcg) by mouth once daily.    Historical Provider, MD   lisinopril 20 mg tablet Take 1 tablet (20 mg) by mouth once daily. 8/29/23   Historical Provider, MD   NON FORMULARY Place 1 each under the tongue once daily. B12 5000 MCG    Historical Provider, MD   propranolol (Inderal) 10 mg tablet Take 1 tablet (10 mg) by mouth 3 times a day. 4/24/24   Fabian Kam MD     Autonomic Testing    Result Date: 9/4/2024  QSART response at distal leg is low or absent, the response at the foot is low normal, and the responses at the proximal leg and forearm are normal. These findings are not consistent with a definite postganglionic sympathetic sudomotor abnormality like that seen in autonomic/small fiber neuropathy. The isolated decreased sweat output at the distal leg is of unclear significance and could be due to a focal loss of sudomotor fibers. Vanessa Lobo MD     EMG & nerve conduction    Result Date: 9/3/2024  Impression: ----------------         Assessment/Plan     Problem List  Items Addressed This Visit    None  Visit Diagnoses       Sjogren's syndrome with dental involvement (Multi)    -  Primary    Small fiber neuropathy                 Patient with suspected Sjogren's.  We will plan for minor salivary gland biopsy.    I had a thorough conversation with the patient during which I discussed the risks, benefits, and alternatives of surgery.  No guarantees were made.  The patient's questions were appropriately addressed.  The patient expressed understanding.    We will proceed with scheduling.    Jonathan K Frankel, MD  Facial Plastic & Reconstructive Surgery  Otolaryngology - Head & Neck Surgery

## 2024-09-25 ENCOUNTER — CONSULT (OUTPATIENT)
Dept: ORTHOPEDIC SURGERY | Facility: CLINIC | Age: 39
End: 2024-09-25
Payer: COMMERCIAL

## 2024-09-25 ENCOUNTER — TELEMEDICINE CLINICAL SUPPORT (OUTPATIENT)
Dept: OCCUPATIONAL THERAPY | Facility: HOSPITAL | Age: 39
End: 2024-09-25
Payer: COMMERCIAL

## 2024-09-25 DIAGNOSIS — M79.7 FIBROMYALGIA: ICD-10-CM

## 2024-09-25 DIAGNOSIS — R41.89 COGNITIVE DEFICITS: ICD-10-CM

## 2024-09-25 DIAGNOSIS — M25.50 POLYARTHRALGIA: Primary | ICD-10-CM

## 2024-09-25 DIAGNOSIS — G93.32 MYALGIC ENCEPHALOMYELITIS/CHRONIC FATIGUE SYNDROME (ME/CFS): ICD-10-CM

## 2024-09-25 DIAGNOSIS — U09.9 POST-ACUTE SEQUELAE OF COVID-19 (PASC): Primary | ICD-10-CM

## 2024-09-25 DIAGNOSIS — G90.A POTS (POSTURAL ORTHOSTATIC TACHYCARDIA SYNDROME): ICD-10-CM

## 2024-09-25 DIAGNOSIS — M24.80 GENERALIZED HYPERMOBILITY OF JOINTS: ICD-10-CM

## 2024-09-25 PROCEDURE — 99243 OFF/OP CNSLTJ NEW/EST LOW 30: CPT | Performed by: PHYSICAL MEDICINE & REHABILITATION

## 2024-09-25 PROCEDURE — 97535 SELF CARE MNGMENT TRAINING: CPT | Mod: GO,95 | Performed by: OCCUPATIONAL THERAPIST

## 2024-09-25 RX ORDER — MELOXICAM 15 MG/1
15 TABLET ORAL ONCE AS NEEDED
Qty: 30 TABLET | Refills: 1 | Status: SHIPPED | OUTPATIENT
Start: 2024-09-25

## 2024-09-25 ASSESSMENT — ACTIVITIES OF DAILY LIVING (ADL): HOME_MANAGEMENT_TIME_ENTRY: 72

## 2024-09-25 NOTE — PROGRESS NOTES
"Occupational Therapy    Patient Name:Rajani Morton  MRN:[id number]  Today's Date:2024  Referred by: * No referring provider recorded for this case *       Therapy Diagnosis  Problem List Items Addressed This Visit    None      Insurance  Visit number: 3  Approved number of visits: JOSUE 3400 DED-MET, 80/20 COVERAGE, 60 OT/PT/ST VISITS COMBINED, NO AUTH REQ, JOSUE #[id number]   Auth required: No  Authorization date range: N/A  Onset Date: 2023  Medicare Certification Period:  Beginnin/4/2024            Endin2024  Payor: JOSUE / Plan: [employer] / Product Type: *No Product type* /     Precautions/Fall Risk:  Fall Risk: High based on  STEADI  Pacemaker: no    Seizures: No      Subjective/Pain:  Virtual or Telephone Consent    An interactive audio and video telecommunication system which permits real time communications between the patient (at the originating site) and provider (at the distant site) was utilized to provide this telehealth service.   Verbal consent was requested and obtained from Rajani Morton on this date, 24 for a telehealth visit.     Very tired today, reports starting a new antidepressant approximately 1 month ago and wondering if it is contributing.  Patient reports that she is struggling with her depression. Patient feeling changes in function, lack of human contact, changes since COVID have been affecting her.  Feels every aspect of her life has changed since COVID.    \"I can see this tool being helpful\" reported by patient after time pressure management instruction.    Current Pain Level (0-10): Patient reports continued chronic pain. Pain number not provided.    HEP Compliance:  Patient did not complete her Get Ready, do, done plan due to having a multi day migraine and struggling with her depression.   Did do fatigue log, paced self yesterday during errands.     Objective/Outcome Measures:     Treatment  ADL/IADL:   minutes  Provided instruction on use " of Self care wheel.  Patient participated in identification of what is self care for each of the areas listed.   Environment- get out of the house, incorporate pacing  Emotional-  continue to work with therapist to recognize emotions, needs to identify tools to increase awareness- recommended 5 minutes a day focusing on breathing, and once a day do one thing where you don't do anything else and focus on being present  Professional- scheduled to return Dec 2., unable to identify tools  Physical- use of walking pad for very short time, [employer] level coffee making at home  Spiritual- used to be very active in [employer] and is now not, Bible study with cousin  Psychological- going to therapy every week, adding emotion rating to daily fatigue tracking    Reviewed fatigue log.  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 Patient planned a dinner using time pressure management.   Dinner- patient gluten free,  DMII  Meal plans weekly, creates grocery list and orders each week.    Attempting to multi task- most of the time during meal prep  How can you reduce that things that must be done at one time?   Break up meal into steps   Mid afternoon, get out spices, get out pots/pans    Recommend she consider doing some meal prep earlier in the day or use pre-cut or frozen vegetables  How can I reduce distractions?   Biggest distraction is phone, Patient indicated that she can place on do not disturb  How can I feel less overwhelmed   Pace self,  avoid the go go go mindset    Is getting a bar stool from her grandparents    Obstacles/challenges-    Body says no, feel like passing out if standing     Plan B -order food    Patient identified plan to prep ground meat in advance, and then freezing.       Home program/[facility]:   Complete meal prep using time pressure management tool/plan, continue to monitor fatigue    Assessment:     Patient struggling with her depression- she would benefit from increasing self care participation. Patient is working with counselor and psychology who are aware.  Patient receptive to self care wheel and to use of time pressure management techniques. Continued OT indicated.     Plan for next session:  Continued to address energy conservation, cognitive strategies and coping skills as able.

## 2024-09-25 NOTE — PROGRESS NOTES
New Consult/New Patient Note    9/25/2024   Roscoe Pino MD    Assessment: Very pleasant 39-year-old female with chronic somewhat diffuse pain.  History of polyarthralgia, fibromyalgia and currently being worked up for Claudio-Danlos.  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  4)  Spine/Surgical Interventions: None at this time  5)  Alternative Treatments: May consider alternative treatment options in the future including manipulation (chiropractor versus osteopathic) and/or acupuncture if patient does not obtain optimal relief with initial treatment plan.  6)  Consultations: Rheumatology  7)  Follow -up: 4-6 weeks or PRN if symptoms worsen/do not improve.   8)  Future treatment considerations: Per rheumatology.  She will follow-up with us if she has any worsening focal joint pain or spine concerns.    Patient advised of the difference between hurt and harm and advised to continue with all normal activities and exercises. Patient verbalized understanding of the above plan and was happy with the care provided.      The above clinical summary has been dictated with voice recognition software. It has not been proofread for grammatical errors, typographical mistakes, or other semantic inconsistencies.    Thank you for visiting our office today. It was our pleasure to take part in your healthcare.     Do not hesitate to call with any questions regarding your plan of care after leaving at [phone]    To clinicians, thank you very much for this kind referral. It is a privilege to partner with you in the care of your patients. My office would be delighted to assist you with any further  "consultations or with questions regarding the plan of care outlined. Do not hesitate to call the office or contact me directly.     Sincerely,    KARIS Dawson MD  , Physical Medicine and Rehabilitation, Orthopedic Spine  [facility]  [facility]         Rajani Morton \"Jared" is a 39 y.o. female who presents with pain at multiple joints.    Location: Somewhat diffuse pain throughout the bilateral lower extremities   Radiation: numbness and tingling in bilateral  lateral thigh area - somewhat diffuse weakness  Quality: achy, burning   current 3/10,  at its worst 10 /10  Exacerbated by walking, bending, gets worse during day  Relieved by sitting  Onset, traumatic event: ? COVID  Has tried:  Heat, ice Iburpofen    Valsalva sign is [n ]   Grocery cart sign is [ n]   Smoker:  [ n]   Does wake them at night yes  Litigation: [n ]     Patient denies bowel/bladder incontinence, denies fever, denies unintentional weight loss, denies clumsiness of hands, feet, or dropping things.  Denies any constitutional or myelopathic symptomatology.      PREVIOUS TREATMENTS  IN THE LAST SIX MONTHS     Active conservative therapy  in the last six months (see below)              1. Physical therapy:  yes                                                                                   2. Home exercise program after PT:  yes                                                    3. A physician supervised home exercise program (HEP):  yes               4. Chiropractic Care: no                                                                     Passive conservative therapy  in the last six months (see below)              1. NSAIDS:  ibuprofen                                                                                                         2. Prescription pain medication:  flexeril                                                           3. Acupuncture: " no                                                                                             4. Tens unit: no     Assistive Devices: none    Work status:        ROS: Other than listed in HPI, PMHX below, and intake paperwork including a 30 point patient-recorded review of symptoms which was personally reviewed and inclusive of no history of unintentional weight loss, change in appetite, significant malaise, fevers, chills, or change in bowel/bladder, shortness of breath, or chest pain.    I have confirmed and edited as necessary Past Medical, Past Surgical, Family, Social History and ROS as obtained by others. These were also obtained on new patient forms.      PHYSICAL EXAM:   GENERAL APPEARANCE:  Well nourished, well developed, and no apparent distress.  NEURO PSYCH: Patient oriented to person, place, Mood pleasant. Benign affect.  MUSCULOSKELETAL and NEUROLOGICAL       VISUAL INSPECTION           LUMBAR: WNL  SPINE ROM:   LUMBAR ROM: Full      PALPATION:           SPINOUS PROCESS: Nontender midline           PARASPINALS: Nontender lower lumbar  FACET LOADING: Negative bilateral lower lumbar  MUSCLE BULK: Normal and symmetrical in the upper & lower extremities.  MUSCLE TONE: Normal  MOTOR: 5/5 in all muscle groups tested in bilateral lower extremities   SENSORY: Normal sensory exam to light touch in the lower extremities  GAIT: Normal.  Able to go up and heels and toes with no sig. weakness.  No sig. balance deficit appreciated  Slump testing negative    DATA REVIEW:   The below imaging studies were personally reviewed and discussed with the patient.    Medical Decision Making:  The above note constitutes a Moderate to High level of medical decision making based on past data and imaging review, new and chronic symptoms with exacerbation, change in weakness or sensation, new imaging and diagnostic studies ordered, discussion of potential interventional or surgical treatment options, acute or  chronic pain that may pose a threat to bodily function.    Past Medical History:   Diagnosis Date    Allergic     Avascular necrosis of bone (Multi) 2019    both hips, from steroids    Disease of thyroid gland     Fibromyalgia     Hypertension     POTS (postural orthostatic tachycardia syndrome)        Medication Documentation Review Audit       Reviewed by Cici Dawson MD (Physician) on 09/25/24 at 1110      Medication Order Taking? Sig Documenting Provider Last Dose Status   azelastine (Astelin) 137 mcg (0.1 %) nasal spray [id number] No Administer 1 spray into each nostril 2 times a day. Use in each nostril as directed Vinay Cuellar MD Taking Active   cetirizine (ZyrTEC) 10 mg chewable tablet [id number] No Chew once daily. Vinay Cuellar MD Taking Active   cyclobenzaprine (Flexeril) 10 mg tablet [id number] No Take 1 tablet (10 mg) by mouth 3 times a day as needed for muscle spasms. Vinay Cuellar MD Taking Active   fluticasone (Flonase) 50 mcg/actuation nasal spray [id number] No Administer 1 spray into each nostril once daily. Shake gently. Before first use, prime pump. After use, clean tip and replace cap. Faustina Soria PA-C Taking Active   ibuprofen 800 mg tablet [id number] No Take 1 tablet (800 mg) by mouth every 6 hours if needed for headaches, mild pain (1 - 3) or moderate pain (4 - 6). Vinay Cuellar MD Taking Active   levothyroxine (Synthroid, Levoxyl) 50 mcg tablet [id number] No Take 1 tablet (50 mcg) by mouth once daily. Vinay Cuellar MD Taking Active   lisinopril 20 mg tablet [id number] No Take 1 tablet (20 mg) by mouth once daily. Historical MD Alisa Taking Active   NON FORMULARY [id number] No Place 1 each under the tongue once daily. B12 5000 MCG Historical MD Alisa Taking Active   propranolol (Inderal) 10 mg tablet [id number] No Take 1 tablet (10 mg) by mouth 3 times a day. Fabian Kam MD Taking Active                    Allergies   Allergen Reactions     Morphine Nausea/vomiting       Social History     Socioeconomic History    Marital status:      Spouse name: Not on file    Number of children: Not on file    Years of education: Not on file    Highest education level: Not on file   Occupational History    Occupation: sell insurance from [employer]   Tobacco Use    Smoking status: Former     Types: Cigarettes    Smokeless tobacco: Never   Vaping Use    Vaping status: Never Used   Substance and Sexual Activity    Alcohol use: Yes     Comment: socially    Drug use: Yes     Types: Marijuana    Sexual activity: Yes     Partners: Male     Comment:    Other Topics Concern    Not on file   Social History Narrative    Not on file     Social Determinants of Health     Financial Resource Strain: Low Risk  (7/16/2024)    Overall Financial Resource Strain (CARDIA)     Difficulty of Paying Living Expenses: Not very hard   Food Insecurity: No Food Insecurity (7/16/2024)    Hunger Vital Sign     Worried About Running Out of Food in the Last Year: Never true     Ran Out of Food in the Last Year: Never true   Transportation Needs: No Transportation Needs (7/16/2024)    PRAPARE - Transportation     Lack of Transportation (Medical): No     Lack of Transportation (Non-Medical): No   Physical Activity: Inactive (7/16/2024)    Exercise Vital Sign     Days of Exercise per Week: 0 days     Minutes of Exercise per Session: 0 min   Stress: Stress Concern Present (7/16/2024)    [employer] [place] [employer] - Occupational Stress Questionnaire     Feeling of Stress : Rather much   Social Connections: Moderately Isolated (7/16/2024)    Social Connection and Isolation Panel [NHANES]     Frequency of Communication with Friends and Family: Three times a week     Frequency of Social Gatherings with Friends and Family: Once a week     Attends Caodaism Services: Never     Active Member of Clubs or Organizations: No     Attends Club or Organization Meetings: Never      Marital Status:    Intimate Partner Violence: Not At Risk (2/10/2024)    Humiliation, Afraid, Rape, and Kick questionnaire     Fear of Current or Ex-Partner: No     Emotionally Abused: No     Physically Abused: No     Sexually Abused: No   Housing Stability: Low Risk  (7/16/2024)    Housing Stability Vital Sign     Unable to Pay for Housing in the Last Year: No     Number of Times Moved in the Last Year: 0     Homeless in the Last Year: No       Past Surgical History:   Procedure Laterality Date    HYSTERECTOMY      1 ovary left    LABYRINTHECTOMY      OTHER SURGICAL HISTORY  01/07/2020    Laparoscopic hysterectomy    RENAL BIOPSY

## 2024-09-27 ENCOUNTER — APPOINTMENT (OUTPATIENT)
Dept: PHYSICAL THERAPY | Facility: CLINIC | Age: 39
End: 2024-09-27
Payer: COMMERCIAL

## 2024-09-30 ENCOUNTER — APPOINTMENT (OUTPATIENT)
Dept: RADIOLOGY | Facility: HOSPITAL | Age: 39
End: 2024-09-30
Payer: COMMERCIAL

## 2024-10-01 ENCOUNTER — OFFICE VISIT (OUTPATIENT)
Dept: GASTROENTEROLOGY | Facility: CLINIC | Age: 39
End: 2024-10-01
Payer: COMMERCIAL

## 2024-10-01 DIAGNOSIS — R19.7 DIARRHEA, UNSPECIFIED TYPE: ICD-10-CM

## 2024-10-01 DIAGNOSIS — K92.1 HEMATOCHEZIA: Primary | ICD-10-CM

## 2024-10-01 DIAGNOSIS — R93.5 ABNORMAL CT OF THE ABDOMEN: ICD-10-CM

## 2024-10-01 DIAGNOSIS — R11.0 NAUSEA: ICD-10-CM

## 2024-10-01 PROCEDURE — 99213 OFFICE O/P EST LOW 20 MIN: CPT | Performed by: NURSE PRACTITIONER

## 2024-10-01 RX ORDER — OMEPRAZOLE 20 MG/1
20 CAPSULE, DELAYED RELEASE ORAL
Qty: 30 CAPSULE | Refills: 3 | Status: SHIPPED | OUTPATIENT
Start: 2024-10-01 | End: 2024-12-30

## 2024-10-01 ASSESSMENT — ENCOUNTER SYMPTOMS
DIAPHORESIS: 0
PSYCHIATRIC NEGATIVE: 1
EYES NEGATIVE: 1
FEVER: 0
ROS GI COMMENTS: SEE HPI
RESPIRATORY NEGATIVE: 1
STRIDOR: 0
WHEEZING: 0
CARDIOVASCULAR NEGATIVE: 1
APNEA: 0
SHORTNESS OF BREATH: 0
ALLERGIC/IMMUNOLOGIC NEGATIVE: 1
ENDOCRINE NEGATIVE: 1
CHEST TIGHTNESS: 0
DIFFICULTY URINATING: 0
HEMATOLOGIC/LYMPHATIC NEGATIVE: 1
COUGH: 0
FATIGUE: 0
CHILLS: 0
NEUROLOGICAL NEGATIVE: 1
MUSCULOSKELETAL NEGATIVE: 1

## 2024-10-01 NOTE — PROGRESS NOTES
"Subjective   Patient ID: Rajani Morton \"Rachana\" is a 39 y.o. female who presents for No chief complaint on file.. PMH: HTN, hypothyroidism, POTs      Was in the ER on Thursday morning  Was having diarrhea, \"just straight blood\" for 12 hours straight  Had a CT scan-found to have \"colitis\"   CT found    IMPRESSION:     MODERATE NONSPECIFIC COLITIS, WORSE IN THE LEFT UPPER QUADRANT, NEW SINCE   10/21/2023. SUGGEST CLINICAL CORRELATION AND GI SURGICAL FOLLOW-UP.     She was treated with IV antibiotics and 7 days and flagyl and omnicef  She reports she is still have some diarrhea, some abdominal pain but no longer bleeding      Her usual is constipation, sometimes diarrhea, now having a BM a few time/day---watery (this is more watery)  She is going 2-5 times/day--she denies nocturnal Bms   Has a long h/o alternating diarrhea/constipation   Was on Augmentin a few months ago    Family Hx: \"a lot of diverticulitis\"  Unsure of IBD, she denies a family hx of CRC, GI cancers     Review of Systems   Constitutional:  Negative for chills, diaphoresis, fatigue and fever.   HENT: Negative.     Eyes: Negative.    Respiratory: Negative.  Negative for apnea, cough, chest tightness, shortness of breath, wheezing and stridor.    Cardiovascular: Negative.    Gastrointestinal:         See HPI    Endocrine: Negative.    Genitourinary: Negative.  Negative for difficulty urinating.   Musculoskeletal: Negative.    Skin: Negative.    Allergic/Immunologic: Negative.    Neurological: Negative.    Hematological: Negative.    Psychiatric/Behavioral: Negative.         Objective   Physical Exam  Neurological:      Mental Status: She is alert.         Assessment/Plan   Diagnoses and all orders for this visit:  Hematochezia  -     C. difficile, PCR; Future  -     Calprotectin, Fecal; Future  -     C-Reactive Protein; Future  -     Tissue Transglutaminase IgA; Future  -     Esophagogastroduodenoscopy (EGD); Future  Diarrhea, unspecified type  -     " C. difficile, PCR; Future  -     Calprotectin, Fecal; Future  -     C-Reactive Protein; Future  -     Tissue Transglutaminase IgA; Future  -     Esophagogastroduodenoscopy (EGD); Future  Abnormal CT of the abdomen  -     C. difficile, PCR; Future  -     Calprotectin, Fecal; Future  -     C-Reactive Protein; Future  -     Tissue Transglutaminase IgA; Future  -     Esophagogastroduodenoscopy (EGD); Future  Nausea  -     omeprazole (PriLOSEC) 20 mg DR capsule; Take 1 capsule (20 mg) by mouth once daily in the morning. Take before meals. Do not crush or chew.  -     Esophagogastroduodenoscopy (EGD); Future    Acute bleeding, abdominal pain, diarrhea, CT showed moderate nonspecific colitis, with increased WBCs which now seems to be improving- complete colonoscopy, +/- EGD   Will give trial of Omeprazole for upper GI symptoms  Complete stool tests  Reviewed s/sx to go to the ER    F/up in 1 month          HEIDI Meredith 10/01/24 10:58 AM

## 2024-10-02 ENCOUNTER — LAB (OUTPATIENT)
Dept: LAB | Facility: LAB | Age: 39
End: 2024-10-02
Payer: COMMERCIAL

## 2024-10-02 ENCOUNTER — EVALUATION (OUTPATIENT)
Dept: PHYSICAL THERAPY | Facility: CLINIC | Age: 39
End: 2024-10-02
Payer: COMMERCIAL

## 2024-10-02 DIAGNOSIS — R93.5 ABNORMAL CT OF THE ABDOMEN: ICD-10-CM

## 2024-10-02 DIAGNOSIS — R19.7 DIARRHEA, UNSPECIFIED TYPE: Primary | ICD-10-CM

## 2024-10-02 DIAGNOSIS — R19.7 DIARRHEA, UNSPECIFIED TYPE: ICD-10-CM

## 2024-10-02 DIAGNOSIS — K92.1 HEMATOCHEZIA: ICD-10-CM

## 2024-10-02 DIAGNOSIS — Z12.11 COLON CANCER SCREENING: Primary | ICD-10-CM

## 2024-10-02 DIAGNOSIS — R68.84 JAW PAIN: Primary | ICD-10-CM

## 2024-10-02 LAB
CRP SERPL-MCNC: 1.23 MG/DL
TTG IGA SER IA-ACNC: <1 U/ML

## 2024-10-02 PROCEDURE — 83993 ASSAY FOR CALPROTECTIN FECAL: CPT

## 2024-10-02 PROCEDURE — 83516 IMMUNOASSAY NONANTIBODY: CPT

## 2024-10-02 PROCEDURE — 36415 COLL VENOUS BLD VENIPUNCTURE: CPT

## 2024-10-02 PROCEDURE — 86140 C-REACTIVE PROTEIN: CPT

## 2024-10-02 RX ORDER — POLYETHYLENE GLYCOL 3350, SODIUM SULFATE ANHYDROUS, SODIUM BICARBONATE, SODIUM CHLORIDE, POTASSIUM CHLORIDE 236; 22.74; 6.74; 5.86; 2.97 G/4L; G/4L; G/4L; G/4L; G/4L
4000 POWDER, FOR SOLUTION ORAL ONCE
Qty: 4000 ML | Refills: 0 | Status: SHIPPED | OUTPATIENT
Start: 2024-10-02 | End: 2024-10-02

## 2024-10-02 ASSESSMENT — PAIN - FUNCTIONAL ASSESSMENT: PAIN_FUNCTIONAL_ASSESSMENT: 0-10

## 2024-10-02 NOTE — PROGRESS NOTES
"Physical Therapy Evaluation and Treatment      Patient Name: Rajani Morton \"Rachana\"  MRN: [id number]  Today's Date: 10/2/2024    Time Entry:      Visit Number: 1  Approved Visits: 60  Auth required: no    Assessment:  PT Assessment  Rehab Prognosis: Fair  Evaluation/Treatment Tolerance: Patient tolerated treatment well   Patient presents with chief complaint of neck pain that has been present since    .Patient notes that    tends to aggravate his/her sxs. Patient demonstrates decreased cervical/thoracic ROM, decreased cervicothoracic strength, impaired posture, and increased neck pain, which limits his/her functional ability. Patient would likely benefit from skilled outpatient PT in order to address the above deficits and return to PLOF.     Plan:  OP PT Plan  Treatment/Interventions: Cryotherapy, Dry needling, Education/ Instruction, Hot pack, Manual therapy, Neuromuscular re-education, Self care/ home management, Taping techniques, Therapeutic activities, Therapeutic exercises  PT Plan: Skilled PT  PT Frequency: 1 time per week  Duration: 6-8 weeks  Number of Treatments Authorized: 60  Rehab Potential: Fair  Plan of Care Agreement: Patient    Current Problem:   1. Jaw pain            Subjective    General:  General  Reason for Referral: jaw pain and tinnitus  Referred By: MICHAEL Salgado  Chief complaint: B jaw pain and tinnitus .   PMHx: fibromyalgia, POTS, endometriosis, cognitive deficits  : verified with patient   Social/Environmental Factors:  PLOF: independent without restrictions  Medications: see chart for full medication list  Patient goals for PT:  Medical Screening: Patient denies recent infection/illness, headaches, chest pain, SOB, difficulty speaking/eating/swallowing, recent trauma, difficulty walking, hand weakness, hx of cancer, weight change, unrelenting pain at night, Ds and Ns, clumsiness/dropping objects   Precautions:  Precautions  Precautions Comment: none; low fall risk  Vital Signs:   "   Pain:  Pain Assessment  Pain Assessment: 0-10  Home Living:     Prior Level of Function:       Objective   Cervical ROM:    Flexion:   Extension:   Rotation:   SB:  Thoracic ROM:    Flexion:   Extension:   Rotation:   SB:  Neuro Exam:  Dermatome exam:  Myotome exam:  Reflexes:  Shoulder ROM (if needed:)   Flexion: R: L:   Abduction: R: L:   Extension: R: L:   ER(functional): R: L:   IR(functional): R: L:   Horizonal Abduction: R: L:   Horizontal Adduction: R: L:  MMT:   Flexion: R:/5L:/5   Abduction: R:/5 L:/5   Extension: R:/5 L:/5   ER(0*): R:/5 L:/5   IR(0*): R:/5 L:/5   ER(90/90): R:/5 L:/5   IR(90/90): R:/5 L:/5   Lower trap: R:/5 L:/5   Middle trap: R:/5 L:/5   Rhomboid: R:/5 L:/5  Special Tests:   ULTT:   Spurlings Compression test:   Distraction Test:   Sharp Aide:   Alar ligament stress test:   DNF Endurance Testing:   Hoffmans:   Babinski:  Palpation:  Joint Play Assessment:   strength:  Observation:  Repeated Movements:     Outcome Measures:        Treatments:      EDUCATION:  Outpatient Education  Individual(s) Educated: Patient  Education Provided: Home Exercise Program  Risk and Benefits Discussed with Patient/Caregiver/Other: yes  Patient/Caregiver Demonstrated Understanding: yes  Plan of Care Discussed and Agreed Upon: yes  Patient Response to Education: Patient/Caregiver Verbalized Understanding of Information  Education Comment: review HEP    Goals:  Patient will demonstrate improvement in NDI by at least 7.5 points in order to meet MCID  Patient will demonstrate full cervical ROM without pain or compensation  Patient will demonstrate at least 4+/5 strength of periscapular musculature  Patient will demonstrate I with HEP  Patient will be able to return to   without pain or compensation

## 2024-10-05 LAB — CALPROTECTIN STL-MCNT: 798 UG/G

## 2024-10-07 ENCOUNTER — LAB (OUTPATIENT)
Dept: LAB | Facility: LAB | Age: 39
End: 2024-10-07
Payer: COMMERCIAL

## 2024-10-07 DIAGNOSIS — R19.7 DIARRHEA, UNSPECIFIED TYPE: ICD-10-CM

## 2024-10-07 PROCEDURE — 87506 IADNA-DNA/RNA PROBE TQ 6-11: CPT

## 2024-10-07 PROCEDURE — 87329 GIARDIA AG IA: CPT

## 2024-10-07 PROCEDURE — 87328 CRYPTOSPORIDIUM AG IA: CPT

## 2024-10-08 ENCOUNTER — TELEMEDICINE CLINICAL SUPPORT (OUTPATIENT)
Dept: OCCUPATIONAL THERAPY | Facility: HOSPITAL | Age: 39
End: 2024-10-08
Payer: COMMERCIAL

## 2024-10-08 DIAGNOSIS — U09.9 POST-ACUTE SEQUELAE OF COVID-19 (PASC): Primary | ICD-10-CM

## 2024-10-08 DIAGNOSIS — G93.32 MYALGIC ENCEPHALOMYELITIS/CHRONIC FATIGUE SYNDROME (ME/CFS): ICD-10-CM

## 2024-10-08 DIAGNOSIS — R41.89 COGNITIVE DEFICITS: ICD-10-CM

## 2024-10-08 PROCEDURE — 97535 SELF CARE MNGMENT TRAINING: CPT | Mod: GO,95 | Performed by: OCCUPATIONAL THERAPIST

## 2024-10-08 ASSESSMENT — ACTIVITIES OF DAILY LIVING (ADL): HOME_MANAGEMENT_TIME_ENTRY: 61

## 2024-10-08 NOTE — PROGRESS NOTES
Occupational Therapy    Patient Name:Rajani Morton  MRN:[id number]  Today's Date:10/8/2024  Referred by: * No referring provider recorded for this case *       Therapy Diagnosis  Problem List Items Addressed This Visit    None      Insurance  Visit number: 4  Approved number of visits: JOSUE 3400 DED-MET, 80/20 COVERAGE, 60 OT/PT/ST VISITS COMBINED, NO AUTH REQ, JOSUE #[id number]   Auth required: No  Authorization date range: N/A  Onset Date: 2023  Medicare Certification Period:  Beginnin/4/24           Endin24  Payor: SHAYYJIE / Plan: [email or web address] PLAN / Product Type: *No Product type* /     Precautions/Fall Risk:  Fall Risk: High based on  STEADI  Pacemaker: no    Seizures: No      Patient reports being in hospital emergency room with ulcerative colitis or chron's disease.  Patient reports that she is having difficulty with eating. She is on a no dairy, no fat, no fiber diet. Patient has colonoscopy and endoscopy scheduled.  She also reports that inflammation number was very high.  Patient had elevated heart rate as well.     Current Pain Level (0-10): 0, no pain reported    HEP Compliance: Patient unable to complete home program due to these GI issues.     Objective/Outcome Measures:     Treatment  ADL/IADL: 61  minutes  Fatigue is worse with new GI symptoms. Patient pacing self. Recommended increased length and increased frequency of rest periods while patient is recovering from GI issue.     Reading-   Patient reporting difficulty with concentrating, and recalling information. Needs to be able to read for fun, learning/research, learning a new skill at work.  Patient reports not feeling confused in the moment when learning, but that it just does not stay.    Initiated instruction on parts of memory and on use of PQRST strategy to improve reading comprehension and recall.  Also initiated instruction on use of internal and external memory strategies. Patient participated in memory training task  involving use of association(Internal memory strategy). Patient was able to recall 8/10 words using association.  Unable to complete reading with PQRST due to time constraints.    .  Home program/Medbridge: Use association memory strategy. Increase length and frequency of fatigue while recovering.     Assessment:     Reading appears to be affected by two different components- memory, and reading/processing info.  Plan to provide further instruction on internal/external memory strategies and further instruction on reading strategies.     Plan for next session:  Continue instruction on memory and reading strategies.

## 2024-10-09 ENCOUNTER — APPOINTMENT (OUTPATIENT)
Dept: PHYSICAL THERAPY | Facility: CLINIC | Age: 39
End: 2024-10-09
Payer: COMMERCIAL

## 2024-10-09 LAB

## 2024-10-10 LAB
CRYPTOSP AG STL QL IA: NEGATIVE
G LAMBLIA AG STL QL IA: NEGATIVE

## 2024-10-12 ENCOUNTER — APPOINTMENT (OUTPATIENT)
Dept: URGENT CARE | Age: 39
End: 2024-10-12
Payer: COMMERCIAL

## 2024-10-12 ENCOUNTER — OFFICE VISIT (OUTPATIENT)
Dept: URGENT CARE | Age: 39
End: 2024-10-12
Payer: COMMERCIAL

## 2024-10-12 VITALS
DIASTOLIC BLOOD PRESSURE: 71 MMHG | OXYGEN SATURATION: 96 % | HEIGHT: 69 IN | SYSTOLIC BLOOD PRESSURE: 98 MMHG | HEART RATE: 121 BPM | BODY MASS INDEX: 36.29 KG/M2 | RESPIRATION RATE: 18 BRPM | TEMPERATURE: 99 F | WEIGHT: 245 LBS

## 2024-10-12 DIAGNOSIS — J06.9 UPPER RESPIRATORY TRACT INFECTION, UNSPECIFIED TYPE: Primary | ICD-10-CM

## 2024-10-12 DIAGNOSIS — J02.9 SORE THROAT: ICD-10-CM

## 2024-10-12 LAB
O+P STL MICRO: NEGATIVE
POC RAPID INFLUENZA A: NEGATIVE
POC RAPID INFLUENZA B: NEGATIVE
POC SARS-COV-2 AG BINAX: NORMAL

## 2024-10-12 RX ORDER — PREDNISONE 10 MG/1
10 TABLET ORAL
Qty: 6 TABLET | Refills: 0 | Status: SHIPPED | OUTPATIENT
Start: 2024-10-12 | End: 2024-10-15

## 2024-10-12 RX ORDER — AMOXICILLIN 875 MG/1
875 TABLET, FILM COATED ORAL 2 TIMES DAILY
Qty: 14 TABLET | Refills: 0 | Status: SHIPPED | OUTPATIENT
Start: 2024-10-12 | End: 2024-10-19

## 2024-10-12 ASSESSMENT — PAIN SCALES - GENERAL: PAINLEVEL: 6

## 2024-10-12 NOTE — PROGRESS NOTES
"Subjective   Patient ID: Rajani Morton \"Jared" is a 39 y.o. female who presents for Fever, Sore Throat, and Headache (Body aches, and chills x 1 day ).  HPI  Presents for evaluation of URI.  Symptoms including fever, congestion, body aches, malaise, and headache have been present for several days and refractory to OTC meds.  No  nausea, vomiting, abdominal pain, CP, or SOB.  No exacerbating factors    Review of Systems    Constitutional:  See HPI   ENT: See HPI  Respiratory:  No shortness of breath, No cough.     Neurologic:  Alert and oriented X4, No numbness, No tingling.    All other systems are negative     Objective     BP 98/71 (BP Location: Left arm, Patient Position: Sitting)   Pulse (!) 121   Temp 37.2 °C (99 °F) (Oral)   Resp 18   Ht 1.753 m (5' 9\")   Wt 111 kg (245 lb)   SpO2 96%   BMI 36.18 kg/m²     Physical Exam    General:  Alert and oriented, No acute distress.    Eye:  Pupils are equal, round and reactive to light, Normal conjunctiva.    HENT:  Normocephalic, unremarkable oropharynx; generalized cervical lymph node tenderness without enlargement; nasal congestion noted  Neck:  Supple    Respiratory: Respirations are non-labored   Musculoskeletal: Normal ROM and strength  Integumentary:  Warm, Dry, Intact, No pallor, No rash.    Neurologic:  Alert, Oriented, Normal sensory, Cranial Nerves II-XII are grossly intact  Psychiatric:  Cooperative, Appropriate mood & affect.    Assessment/Plan   Exam is consistent with an upper respiratory infection.  COVID and flu swabs are negative.  Prescriptions for amoxicillin and prednisone.  Patient's clinical presentation is otherwise unremarkable at this time. Patient is discharged with instructions to follow-up with primary care or seek emergency medical attention for worsening symptoms or any new concerns.  Problem List Items Addressed This Visit    None  Visit Diagnoses       Sore throat        Relevant Orders    POCT Covid-19 Rapid Antigen (Completed) "    POCT Influenza A/B manually resulted (Completed)            Final diagnoses:   [J02.9] Sore throat

## 2024-10-14 ENCOUNTER — HOME HEALTH ADMISSION (OUTPATIENT)
Dept: HOME HEALTH SERVICES | Facility: HOME HEALTH | Age: 39
End: 2024-10-14
Payer: COMMERCIAL

## 2024-10-14 ENCOUNTER — PATIENT MESSAGE (OUTPATIENT)
Dept: OTHER | Facility: CLINIC | Age: 39
End: 2024-10-14

## 2024-10-14 DIAGNOSIS — G90.A POTS (POSTURAL ORTHOSTATIC TACHYCARDIA SYNDROME): ICD-10-CM

## 2024-10-14 DIAGNOSIS — U09.9 POST-ACUTE SEQUELAE OF COVID-19 (PASC): Primary | ICD-10-CM

## 2024-10-14 DIAGNOSIS — E86.0 DEHYDRATION: ICD-10-CM

## 2024-10-18 ENCOUNTER — APPOINTMENT (OUTPATIENT)
Dept: OCCUPATIONAL THERAPY | Facility: HOSPITAL | Age: 39
End: 2024-10-18
Payer: COMMERCIAL

## 2024-10-19 DIAGNOSIS — R00.2 PALPITATIONS: ICD-10-CM

## 2024-10-19 DIAGNOSIS — U09.9 POST-ACUTE SEQUELAE OF COVID-19 (PASC): ICD-10-CM

## 2024-10-21 RX ORDER — PROPRANOLOL HYDROCHLORIDE 10 MG/1
10 TABLET ORAL 2 TIMES DAILY
Qty: 180 TABLET | Refills: 0 | Status: SHIPPED | OUTPATIENT
Start: 2024-10-21

## 2024-10-28 ENCOUNTER — APPOINTMENT (OUTPATIENT)
Dept: GASTROENTEROLOGY | Facility: CLINIC | Age: 39
End: 2024-10-28
Payer: COMMERCIAL

## 2024-10-28 ENCOUNTER — ANESTHESIA EVENT (OUTPATIENT)
Dept: GASTROENTEROLOGY | Facility: EXTERNAL LOCATION | Age: 39
End: 2024-10-28

## 2024-10-28 ENCOUNTER — APPOINTMENT (OUTPATIENT)
Dept: OTOLARYNGOLOGY | Facility: CLINIC | Age: 39
End: 2024-10-28
Payer: COMMERCIAL

## 2024-11-07 ENCOUNTER — APPOINTMENT (OUTPATIENT)
Dept: GASTROENTEROLOGY | Facility: EXTERNAL LOCATION | Age: 39
End: 2024-11-07
Payer: COMMERCIAL

## 2024-11-07 ENCOUNTER — HOSPITAL ENCOUNTER (OUTPATIENT)
Dept: GASTROENTEROLOGY | Facility: EXTERNAL LOCATION | Age: 39
Discharge: HOME | End: 2024-11-07
Payer: COMMERCIAL

## 2024-11-07 ENCOUNTER — ANESTHESIA (OUTPATIENT)
Dept: GASTROENTEROLOGY | Facility: EXTERNAL LOCATION | Age: 39
End: 2024-11-07

## 2024-11-07 ENCOUNTER — APPOINTMENT (OUTPATIENT)
Dept: OTHER | Facility: CLINIC | Age: 39
End: 2024-11-07
Payer: COMMERCIAL

## 2024-11-07 VITALS
HEIGHT: 69 IN | HEART RATE: 55 BPM | BODY MASS INDEX: 34.8 KG/M2 | RESPIRATION RATE: 12 BRPM | WEIGHT: 235 LBS | SYSTOLIC BLOOD PRESSURE: 111 MMHG | TEMPERATURE: 99 F | DIASTOLIC BLOOD PRESSURE: 75 MMHG | OXYGEN SATURATION: 98 %

## 2024-11-07 DIAGNOSIS — R93.5 ABNORMAL CT OF THE ABDOMEN: ICD-10-CM

## 2024-11-07 DIAGNOSIS — R10.11 RUQ PAIN: ICD-10-CM

## 2024-11-07 DIAGNOSIS — K92.1 HEMATOCHEZIA: Primary | ICD-10-CM

## 2024-11-07 DIAGNOSIS — R11.0 NAUSEA: ICD-10-CM

## 2024-11-07 DIAGNOSIS — R19.7 DIARRHEA, UNSPECIFIED TYPE: ICD-10-CM

## 2024-11-07 PROCEDURE — 43239 EGD BIOPSY SINGLE/MULTIPLE: CPT | Performed by: INTERNAL MEDICINE

## 2024-11-07 PROCEDURE — 45380 COLONOSCOPY AND BIOPSY: CPT | Performed by: INTERNAL MEDICINE

## 2024-11-07 RX ORDER — LIDOCAINE HYDROCHLORIDE 20 MG/ML
INJECTION, SOLUTION INFILTRATION; PERINEURAL AS NEEDED
Status: DISCONTINUED | OUTPATIENT
Start: 2024-11-07 | End: 2024-11-07

## 2024-11-07 RX ORDER — METHYLPHENIDATE HYDROCHLORIDE 27 MG/1
27 TABLET ORAL EVERY MORNING
COMMUNITY
Start: 2024-09-03

## 2024-11-07 RX ORDER — DULOXETIN HYDROCHLORIDE 20 MG/1
20 CAPSULE, DELAYED RELEASE ORAL 2 TIMES DAILY
COMMUNITY
Start: 2024-09-04

## 2024-11-07 RX ORDER — SODIUM CHLORIDE 9 MG/ML
INJECTION, SOLUTION INTRAVENOUS CONTINUOUS PRN
Status: DISCONTINUED | OUTPATIENT
Start: 2024-11-07 | End: 2024-11-07

## 2024-11-07 RX ORDER — PROPOFOL 10 MG/ML
INJECTION, EMULSION INTRAVENOUS AS NEEDED
Status: DISCONTINUED | OUTPATIENT
Start: 2024-11-07 | End: 2024-11-07

## 2024-11-07 SDOH — HEALTH STABILITY: MENTAL HEALTH: CURRENT SMOKER: 0

## 2024-11-07 ASSESSMENT — COLUMBIA-SUICIDE SEVERITY RATING SCALE - C-SSRS

## 2024-11-07 ASSESSMENT — PAIN - FUNCTIONAL ASSESSMENT
PAIN_FUNCTIONAL_ASSESSMENT: 0-10

## 2024-11-07 ASSESSMENT — PAIN SCALES - GENERAL
PAINLEVEL_OUTOF10: 0 - NO PAIN
PAIN_LEVEL: 0
PAINLEVEL_OUTOF10: 0 - NO PAIN

## 2024-11-07 NOTE — DISCHARGE INSTRUCTIONS

## 2024-11-07 NOTE — ANESTHESIA POSTPROCEDURE EVALUATION
"Patient: Rajani Morton \"Rachana\"    Procedure Summary       Date: 11/07/24 Room / Location: [facility]    Anesthesia Start: 1021 Anesthesia Stop:     Procedures:       EGD      COLONOSCOPY Diagnosis:       Hematochezia      Diarrhea, unspecified type      Abnormal CT of the abdomen      Nausea      RUQ pain      Hematochezia    Scheduled Providers: Pedro Luis Toney MD Responsible Provider: RUTH ANN Ferro    Anesthesia Type: MAC ASA Status: 2            Anesthesia Type: MAC    Vitals Value Taken Time   /59 11/07/24 1049   Temp 37.2 °C (99 °F) 11/07/24 1049   Pulse 87 11/07/24 1049   Resp 14 11/07/24 1049   SpO2 99 % 11/07/24 1049       Anesthesia Post Evaluation    Patient location during evaluation: bedside  Patient participation: complete - patient cannot participate  Level of consciousness: awake and responsive to verbal stimuli  Pain score: 0  Pain management: adequate  Airway patency: patent  Cardiovascular status: acceptable and hemodynamically stable  Respiratory status: acceptable  Hydration status: acceptable  Postoperative Nausea and Vomiting: none    No notable events documented.    "

## 2024-11-07 NOTE — H&P
Procedure H&P    Patient Profile-Procedures  Name Rajani Morton  Date of Birth 1985  MRN [id number]  Address   [address] [place] [id number] [place] [address]    Primary Phone Number [phone]  Secondary Phone Number    Faustina Prado    Procedure(s):  Procedures: EGD and Colonoscopy  Primary contact name and number   Extended Emergency Contact Information  Primary Emergency Contact: Radu Morton  Mobile Phone: [phone]  Relation: Spouse   needed? No  Secondary Emergency Contact: WillardLora   [place]  Mobile Phone: [phone]  Relation: Sister    General Health  Weight   Vitals:    11/07/24 1010   Weight: 107 kg (235 lb)     BMI Body mass index is 34.7 kg/m².    Allergies  Allergies   Allergen Reactions    Morphine Nausea/vomiting       Past Medical History   Past Medical History:   Diagnosis Date    ADHD     Allergic     Avascular necrosis of bone (Multi) 2019    both hips, from steroids    Disease of thyroid gland     Fibromyalgia     Hypertension     POTS (postural orthostatic tachycardia syndrome)        Provider assessment  Diagnosis: Anemia and Abdominal Pain  Medication Reviewed - yes  Prior to Admission medications    Medication Sig Start Date End Date Taking? Authorizing Provider   azelastine (Astelin) 137 mcg (0.1 %) nasal spray Administer 1 spray into each nostril 2 times a day. Use in each nostril as directed   Yes Historical Provider, MD   cetirizine (ZyrTEC) 10 mg chewable tablet Chew once daily.   Yes Historical Provider, MD   cyclobenzaprine (Flexeril) 10 mg tablet Take 1 tablet (10 mg) by mouth 3 times a day as needed for muscle spasms.   Yes Historical Provider, MD   DULoxetine (Cymbalta) 20 mg DR capsule Take 1 capsule (20 mg) by mouth 2 times a day. 9/4/24  Yes Historical Provider, MD   fluticasone (Flonase) 50 mcg/actuation nasal spray Administer 1 spray into each nostril once daily. Shake gently. Before first use, prime  pump. After use, clean tip and replace cap. 2/5/24 2/4/25 Yes Faustina Soria PA-C   ibuprofen 800 mg tablet Take 1 tablet (800 mg) by mouth every 6 hours if needed for headaches, mild pain (1 - 3) or moderate pain (4 - 6). 7/18/20  Yes Historical Provider, MD   levothyroxine (Synthroid, Levoxyl) 50 mcg tablet Take 1 tablet (50 mcg) by mouth once daily.   Yes Historical Provider, MD   lisinopril 20 mg tablet Take 1 tablet (20 mg) by mouth once daily. 8/29/23  Yes Historical Provider, MD   meloxicam (Mobic) 15 mg tablet Take 1 tablet (15 mg) by mouth 1 time if needed for moderate pain (4 - 6) for up to 1 dose. 9/25/24  Yes Cici Dawson MD   methylphenidate ER (CONCERTA) 27 mg oral extended release tablet Take 1 tablet (27 mg) by mouth once daily in the morning. 9/3/24  Yes Historical Provider, MD   NON FORMULARY Place 1 each under the tongue once daily. B12 5000 MCG   Yes Historical Provider, MD   omeprazole (PriLOSEC) 20 mg DR capsule Take 1 capsule (20 mg) by mouth once daily in the morning. Take before meals. Do not crush or chew. 10/1/24 12/30/24 Yes HEIDI Meredith   propranolol (Inderal) 10 mg tablet TAKE 1 TABLET BY MOUTH TWICE A DAY 10/21/24  Yes HEIDI Bautista       Physical Exam  Vitals:    11/07/24 1010   BP: 123/74   Pulse: 65   Resp: 14   Temp: 36.8 °C (98.2 °F)   SpO2: 100%        General: A&Ox3, NAD.  HEENT: AT/NC.   CV: RRR. No murmur.  Resp: CTA bilaterally. No wheezing, rhonchi or rales.   GI: Soft, NT/ND. BSx4.  Extrem: No edema. Pulses intact.  Neuro: No focal deficits.   Psych: Normal mood and affect.      Procedure Plan - pre-procedural (re)assesment completed by physician:  discharge/transfer patient when discharge criteria met      ASA status 2  Mallampati score 2  Pedro Luis Toney MD  11/7/2024 10:21 AM

## 2024-11-07 NOTE — ANESTHESIA PREPROCEDURE EVALUATION
"Patient: Rajani Morton \"Rachana\"    Procedure Information       Date/Time: 11/07/24 1000    Scheduled providers: Pedro Luis Toney MD    Procedures:       EGD      COLONOSCOPY    Location: [facility]            Relevant Problems   Cardiac   (+) Essential hypertension      Pulmonary   (+) Dyspnea on exertion      Liver   (+) Steatosis of liver      Endocrine   (+) Hypothyroidism, acquired      Musculoskeletal   (+) Fibromyalgia      GYN   (+) Endometriosis       Clinical information reviewed:    Allergies                NPO Detail:  No data recorded     Physical Exam    Airway  Mallampati: II  TM distance: >3 FB  Neck ROM: full     Cardiovascular - normal exam     Dental - normal exam     Pulmonary - normal exam  Breath sounds clear to auscultation     Abdominal            Anesthesia Plan    History of general anesthesia?: yes  History of complications of general anesthesia?: no    ASA 2     MAC     The patient is not a current smoker.    intravenous induction   Anesthetic plan and risks discussed with patient.    Plan discussed with CRNA.      "

## 2024-11-13 LAB
LABORATORY COMMENT REPORT: NORMAL
PATH REPORT.FINAL DX SPEC: NORMAL
PATH REPORT.GROSS SPEC: NORMAL
PATH REPORT.TOTAL CANCER: NORMAL

## 2024-11-19 DIAGNOSIS — E03.9 HYPOTHYROIDISM, ACQUIRED: Primary | ICD-10-CM

## 2024-11-20 DIAGNOSIS — G90.A POTS (POSTURAL ORTHOSTATIC TACHYCARDIA SYNDROME): ICD-10-CM

## 2024-11-20 DIAGNOSIS — E03.9 HYPOTHYROIDISM, ACQUIRED: Primary | ICD-10-CM

## 2024-11-20 RX ORDER — LEVOTHYROXINE SODIUM 50 UG/1
50 TABLET ORAL DAILY
Qty: 30 TABLET | Refills: 1 | Status: SHIPPED | OUTPATIENT
Start: 2024-11-20

## 2024-11-20 RX ORDER — LISINOPRIL 20 MG/1
20 TABLET ORAL DAILY
Qty: 90 TABLET | Refills: 0 | Status: SHIPPED | OUTPATIENT
Start: 2024-11-20

## 2024-11-22 DIAGNOSIS — M25.50 POLYARTHRALGIA: ICD-10-CM

## 2024-11-22 RX ORDER — MELOXICAM 15 MG/1
15 TABLET ORAL DAILY PRN
Qty: 30 TABLET | Refills: 0 | Status: SHIPPED | OUTPATIENT
Start: 2024-11-22 | End: 2024-12-22

## 2024-11-26 ENCOUNTER — TELEMEDICINE CLINICAL SUPPORT (OUTPATIENT)
Dept: OCCUPATIONAL THERAPY | Facility: HOSPITAL | Age: 39
End: 2024-11-26
Payer: COMMERCIAL

## 2024-11-26 DIAGNOSIS — R41.89 COGNITIVE DEFICITS: ICD-10-CM

## 2024-11-26 DIAGNOSIS — G93.32 MYALGIC ENCEPHALOMYELITIS/CHRONIC FATIGUE SYNDROME (ME/CFS): ICD-10-CM

## 2024-11-26 DIAGNOSIS — U09.9 POST-ACUTE SEQUELAE OF COVID-19 (PASC): ICD-10-CM

## 2024-11-26 PROCEDURE — 97530 THERAPEUTIC ACTIVITIES: CPT | Mod: GO,95 | Performed by: OCCUPATIONAL THERAPIST

## 2024-11-26 PROCEDURE — 97535 SELF CARE MNGMENT TRAINING: CPT | Mod: GO,59,95 | Performed by: OCCUPATIONAL THERAPIST

## 2024-11-26 ASSESSMENT — ACTIVITIES OF DAILY LIVING (ADL): HOME_MANAGEMENT_TIME_ENTRY: 56

## 2024-11-26 NOTE — PROGRESS NOTES
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    Cognitive deficits R41.89       Insurance  Visit number: 5  Approved number of visits: 60 OT/PT/ST combined  Auth required: No  Authorization date range: N/A  Onset Date: 2023  Medicare Certification Period:  Beginnin2024            Endin25  Payor: [employer] / Plan: [employer] HEALTH PLAN / Product Type: *No Product type* /     Precautions/Fall Risk:  Fall Risk: High based on  STEADI  Pacemaker: no    Seizures: No      Virtual or Telephone Consent    An interactive audio and video telecommunication system which permits real time communications between the patient (at the originating site) and provider (at the distant site) was utilized to provide this telehealth service.   Verbal consent was requested and obtained from Rajani Morton on this date, 24 for a telehealth visit.      Subjective/Pain:  Patient has had testing and has upcoming appointment to meet with gastroenterologist.  Patient has lost 37 pounds in 7 weeks.  Patient reports that she is also losing hair.     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     Current Pain Level (0-10): Patient reports gastric discomfort. Pain number not provided.     HEP Compliance:  Patient unable to complete home program due to illness.     Objective/Outcome Measures:     Treatment  ADL/IADL: 56  minutes  Extreme fatigue after 2 stores with a total of 3 purchases,  drove.  Patient reports that she felt as if she had the flu.     Patient reports that she has gotten better at using the fatigue traffic light. Patient feels she is better at knowing and recognizing her limitations.  She is stopping 90% of the time when she feels symptoms at the red level.  She reports that she has not been in the green level for a while.     Patient reports using time pressure management strategies, she planned to cook a portion of Thanksgiving dinner each day for 3 days.     Provided instruction on use of spoon theory.  Patient feels she is looking at where she can rest, how much she has to do regularly.  Patient creates a list of what she needs to accomplish in order of priority.  Typical day:  Wake up 7:30  Pack lunch for   Make coffee  Sit on sofa  Order groceries  Take food to father-in-law  Play with cat  Sit for 45 minutes  Eat lunch  Rest  Empty   Clean out fridge  Rest    Recommend patient add structured work/rest periods.   Patient reports that she has a hard time not doing anything.  Discussed making sure rest breaks are restorative.   Provided instruction on jotting down ideas that pop into your head.  To start recommend Short breaks of 5 minutes for 3 trials, and then 30 minutes for longer break, 15 minutes of work    Be gentle with self, gastro issues will increase fatigue. Patient also indicates dehydration affecting POTS symptoms.    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 Does not have the time to look up info for every call.  Patient indicates that she would have to look them up every time.       Therapeutic activity:  12 minutes  Patient instructed in use of Brain HQ to improve working memory and attention. Patient participated in one round of butterfly memory on  Brain HQ with score of 2512 milliseconds.      Home program/Medbridge:  spoon theory, Brain HQ    Assessment:     Prioritized management of fatigue to help decrease cognitive impairments.   Patient receptive to instruction on use of spoon theory.  She would also benefit from implementing structured work/rest period due to tendency to push until she hits red level before resting.  Patient may struggle with rest periods due to feeling as if she needs to be doing something.  Stressed need for restorative tasks to decrease fatigue.  Patient will experiment with restorative tasks and will implement more structure to her work/rest. Goals reviewed and remain appropriate. Plan of care extended 90 days.     Plan for next session:  Continue to address energy conservation techniques and cognitive strategies/retraining.

## 2024-12-04 ENCOUNTER — OFFICE VISIT (OUTPATIENT)
Dept: GASTROENTEROLOGY | Facility: CLINIC | Age: 39
End: 2024-12-04
Payer: COMMERCIAL

## 2024-12-04 VITALS
TEMPERATURE: 97.1 F | DIASTOLIC BLOOD PRESSURE: 94 MMHG | HEART RATE: 71 BPM | WEIGHT: 234 LBS | SYSTOLIC BLOOD PRESSURE: 132 MMHG | HEIGHT: 69 IN | BODY MASS INDEX: 34.66 KG/M2

## 2024-12-04 DIAGNOSIS — K60.2 ANAL FISSURE: ICD-10-CM

## 2024-12-04 DIAGNOSIS — R19.5 ELEVATED FECAL CALPROTECTIN: Primary | ICD-10-CM

## 2024-12-04 DIAGNOSIS — R19.7 DIARRHEA, UNSPECIFIED TYPE: ICD-10-CM

## 2024-12-04 DIAGNOSIS — R11.0 NAUSEA: ICD-10-CM

## 2024-12-04 PROCEDURE — 3008F BODY MASS INDEX DOCD: CPT | Performed by: NURSE PRACTITIONER

## 2024-12-04 PROCEDURE — 3075F SYST BP GE 130 - 139MM HG: CPT | Performed by: NURSE PRACTITIONER

## 2024-12-04 PROCEDURE — 99213 OFFICE O/P EST LOW 20 MIN: CPT | Performed by: NURSE PRACTITIONER

## 2024-12-04 PROCEDURE — 3080F DIAST BP >= 90 MM HG: CPT | Performed by: NURSE PRACTITIONER

## 2024-12-04 RX ORDER — DOCUSATE SODIUM 100 MG/1
100 CAPSULE, LIQUID FILLED ORAL 2 TIMES DAILY
Qty: 60 CAPSULE | Refills: 0 | Status: SHIPPED | OUTPATIENT
Start: 2024-12-04

## 2024-12-04 RX ORDER — OMEPRAZOLE 20 MG/1
20 CAPSULE, DELAYED RELEASE ORAL
Qty: 30 CAPSULE | Refills: 3 | Status: SHIPPED | OUTPATIENT
Start: 2024-12-04 | End: 2025-04-03

## 2024-12-04 RX ORDER — WHEAT DEXTRIN 5 G/7.4 G
POWDER (GRAM) ORAL
Qty: 248 G | Refills: 1 | Status: SHIPPED | OUTPATIENT
Start: 2024-12-04

## 2024-12-04 RX ORDER — HYOSCYAMINE SULFATE 0.125 MG
0.12 TABLET ORAL EVERY 6 HOURS PRN
Qty: 100 TABLET | Refills: 11 | Status: SHIPPED | OUTPATIENT
Start: 2024-12-04 | End: 2025-12-04

## 2024-12-04 ASSESSMENT — ENCOUNTER SYMPTOMS
HEMATOLOGIC/LYMPHATIC NEGATIVE: 1
RESPIRATORY NEGATIVE: 1
FATIGUE: 0
SHORTNESS OF BREATH: 0
APNEA: 0
ENDOCRINE NEGATIVE: 1
COUGH: 0
ALLERGIC/IMMUNOLOGIC NEGATIVE: 1
EYES NEGATIVE: 1
DIAPHORESIS: 0
MUSCULOSKELETAL NEGATIVE: 1
DIFFICULTY URINATING: 0
WHEEZING: 0
STRIDOR: 0
PSYCHIATRIC NEGATIVE: 1
ROS GI COMMENTS: SEE HPI
CHEST TIGHTNESS: 0
NEUROLOGICAL NEGATIVE: 1
FEVER: 0
CHILLS: 0
CARDIOVASCULAR NEGATIVE: 1

## 2024-12-04 NOTE — PATIENT INSTRUCTIONS
Nice to see you    Recheck stool tests    Start benefiber one teaspoon daily with a full glass of water    Start a stool softener daily     I will treat you for an anal fissure  Start colace   Start benefiber    Give this about 4 weeks but if bleeding or pain continues see colorectal  (monica yanez)    You can try hyoscyamine for cramping as needed     GERD - Avoid overeating at mealtime.  Stay upright for 2 hours after eating.  Do not eat for 3-4 hours before going to bed.  Elevated the head of the bed 6 inches when you are sleeping. Avoid excessive amounts of fried foods, acidic foods, spicy foods, alcohol, caffeine, peppermint, spearmint, chocolate, non-steroidal anti- inflammatory medications, such as, Ibuprofen, Advil, Motrin, Aleve, Naproxen, Naprosyn, Moloxicam, Etodolac, and Voltaren.

## 2024-12-04 NOTE — PROGRESS NOTES
"Subjective   Patient ID: Rajani Morton \"Rachana\" is a 39 y.o. female who presents for Follow-up (Endoscopy/Colonoscopy results/Occasional blood /Constipation/diarrhea ). PMH: HTN, hypothyroidism, POTs    Underwent a colonoscopy and EGD  Colonoscopy biopsies were negative and no signs of colitis  EGD showed GERD    Fecal calprotectin was 798    Still having intermittent diarrhea, gas and bloating. She is no longer having rectal bleeding, she has intermittent constipation.   Has diarrhea for a few days, then she has constipation and then will have rock hard stools.     Has rectal pain now, anal fissure      CT found    IMPRESSION:     MODERATE NONSPECIFIC COLITIS, WORSE IN THE LEFT UPPER QUADRANT, NEW SINCE   10/21/2023. SUGGEST CLINICAL CORRELATION AND GI SURGICAL FOLLOW-UP.     Family Hx: \"a lot of diverticulitis\"  Unsure of IBD, she denies a family hx of CRC, GI cancers     Review of Systems   Constitutional:  Negative for chills, diaphoresis, fatigue and fever.   HENT: Negative.     Eyes: Negative.    Respiratory: Negative.  Negative for apnea, cough, chest tightness, shortness of breath, wheezing and stridor.    Cardiovascular: Negative.    Gastrointestinal:         See HPI    Endocrine: Negative.    Genitourinary: Negative.  Negative for difficulty urinating.   Musculoskeletal: Negative.    Skin: Negative.    Allergic/Immunologic: Negative.    Neurological: Negative.    Hematological: Negative.    Psychiatric/Behavioral: Negative.         Objective   Physical Exam  Neurological:      Mental Status: She is alert.       Assessment/Plan   Diagnoses and all orders for this visit:  Hematochezia  Diarrhea, unspecified type  Abnormal CT of the abdomen  Nausea      Acute bleeding, abdominal pain, diarrhea, CT showed moderate nonspecific colitis, with increased WBCs which now seems to be improving- colonoscopy was normal other than hemorrhoids, she now has an anal fissure and will start Nifedipine, complete CTE to " evaluate for small bowel, recheck fecal calprotectin and f/up with CORs if bleeding/pain continues    Consider SIBO breath test at follow-up  Reflux- continue Omeprazole   Reviewed s/sx to go to the ER    F/up in 1 month          CRISTIN Meredith-CNP 12/04/24 9:20 AM

## 2024-12-09 ENCOUNTER — APPOINTMENT (OUTPATIENT)
Dept: PRIMARY CARE | Facility: CLINIC | Age: 39
End: 2024-12-09
Payer: COMMERCIAL

## 2024-12-09 ENCOUNTER — LAB (OUTPATIENT)
Dept: LAB | Facility: LAB | Age: 39
End: 2024-12-09
Payer: COMMERCIAL

## 2024-12-09 DIAGNOSIS — R19.5 ELEVATED FECAL CALPROTECTIN: ICD-10-CM

## 2024-12-09 DIAGNOSIS — K60.2 ANAL FISSURE: Primary | ICD-10-CM

## 2024-12-09 DIAGNOSIS — R19.7 DIARRHEA, UNSPECIFIED TYPE: ICD-10-CM

## 2024-12-09 LAB
CREAT SERPL-MCNC: 0.77 MG/DL (ref 0.5–1.05)
EGFRCR SERPLBLD CKD-EPI 2021: >90 ML/MIN/1.73M*2
ERYTHROCYTE [DISTWIDTH] IN BLOOD BY AUTOMATED COUNT: 13.7 % (ref 11.5–14.5)
HCT VFR BLD AUTO: 40.3 % (ref 36–46)
HGB BLD-MCNC: 12.6 G/DL (ref 12–16)
MCH RBC QN AUTO: 28.5 PG (ref 26–34)
MCHC RBC AUTO-ENTMCNC: 31.3 G/DL (ref 32–36)
MCV RBC AUTO: 91 FL (ref 80–100)
NRBC BLD-RTO: 0 /100 WBCS (ref 0–0)
PLATELET # BLD AUTO: 395 X10*3/UL (ref 150–450)
RBC # BLD AUTO: 4.42 X10*6/UL (ref 4–5.2)
WBC # BLD AUTO: 5.4 X10*3/UL (ref 4.4–11.3)

## 2024-12-09 PROCEDURE — 82565 ASSAY OF CREATININE: CPT

## 2024-12-09 PROCEDURE — 36415 COLL VENOUS BLD VENIPUNCTURE: CPT

## 2024-12-09 PROCEDURE — 85027 COMPLETE CBC AUTOMATED: CPT

## 2024-12-11 ENCOUNTER — TELEMEDICINE CLINICAL SUPPORT (OUTPATIENT)
Dept: OCCUPATIONAL THERAPY | Facility: HOSPITAL | Age: 39
End: 2024-12-11
Payer: COMMERCIAL

## 2024-12-11 DIAGNOSIS — G93.32 MYALGIC ENCEPHALOMYELITIS/CHRONIC FATIGUE SYNDROME (ME/CFS): ICD-10-CM

## 2024-12-11 DIAGNOSIS — U09.9 POST-ACUTE SEQUELAE OF COVID-19 (PASC): Primary | ICD-10-CM

## 2024-12-11 DIAGNOSIS — R41.89 COGNITIVE DEFICITS: ICD-10-CM

## 2024-12-11 PROCEDURE — 97535 SELF CARE MNGMENT TRAINING: CPT | Mod: GO,95 | Performed by: OCCUPATIONAL THERAPIST

## 2024-12-11 ASSESSMENT — ACTIVITIES OF DAILY LIVING (ADL): HOME_MANAGEMENT_TIME_ENTRY: 59

## 2024-12-11 NOTE — PROGRESS NOTES
"Occupational Therapy    Patient Name:Rajani Morton  MRN:[id number]  Today's Date:2024  Referred by: * No referring provider recorded for this case *       Therapy Diagnosis  Problem List Items Addressed This Visit    None      Insurance  Visit number: 6  Approved number of visits: 60 OT/PT/ST  Auth required: No  Authorization date range: N/A  Onset Date: 2023  Medicare Certification Period:  Beginnin/26/24          Endin25  Payor: [employer] / Plan: [employer] HEALTH PLAN / Product Type: *No Product type* /     Precautions/Fall Risk:  Fall Risk: High based on  STEADI  Pacemaker: no    Seizures: No      Virtual or Telephone Consent    An interactive audio and video telecommunication system which permits real time communications between the patient (at the originating site) and provider (at the distant site) was utilized to provide this telehealth service.   Verbal consent was requested and obtained from Rajani Morton on this date, 24 for a telehealth visit.      Subjective/Pain:  Patient indicates that she is mentally feeling better.  She is still however having gastro issues. Patient reports that testing indicated silent reflux and that esophagus has pre -cancerous cells.  Colon looked good per patient.  Patient has further testing coming up.     \"My friend sent me something for relaxation that looks like the occulus.  It has heat and vibration and gets completely dark\".  Patient has tried it one time and feels it is effective.     Patient has started Brain HQ.     Current Pain Level (0-10): Patient reports continued stomach/abdominal discomfort, pain level not reported    HEP Compliance: Good    Objective/Outcome Measures:     Treatment  ADL/IADL:  59 minutes  Fatigue: patient reports managing better overall. She had some poor nights of sleep with  having low blood sugar.  Prior to this weekend, she feels she was doing better. Patient also reports that she has been able to keep " kitchen clean.  Patient broke up kitchen cleaning into 3 days at start of cleaning.      Arm strength- Patient decided to hold off, since she is starting PT next week    Reading: Patient trying to focus more when when reading.  She finds that she looses focus quickly and easily. Patient finding that one little things can cause her attention to shift. Provided instruction on use of guided imagery.  Patient indicates that she felt very relaxed. 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 Created plan for patient to read 3 minutes a day and read something of decreased difficulty.     Cognitive decline- not working as fast, math harder, attention worse, short term memory worse.  Provided instruction on External vs internal memory strategies.  Patient participated in internal memory strategy training.    - linking 8/10 correct   - chunking 8/8 correct   -visualization- patient reports that she had 3 things to get and could not use her phone, she indicates ability to recall first item only   Recalling grocery list using visualization, assistance required      Home program/[facility]:   Use linking, chunking and visualization strategies.  Add mindfulness to help improve attention  Continue with pacing    Assessment:   Patient is demonstrating improvement in use of energy conservation techniques. She would benefit from further instruction to maximize fatigue management. Patient  receptive to instruction on use of internal memory strategies. She performed better with linking and chunking than with visualization.  Patient would benefit from further instruction on memory strategies.     Plan for next session:  continue instruction on fatigue management/energy conservation and cognitive skills retraining/compensation

## 2024-12-13 DIAGNOSIS — E03.9 HYPOTHYROIDISM, ACQUIRED: ICD-10-CM

## 2024-12-13 NOTE — PROGRESS NOTES
INGE Virtual The virtual visit conducted with Audio and Video.    Verbal consent was given for the following virtual visit, patient is currently located in [place]. All issues discussed and addressed below were done so without a physical examination. If it was felt the patient needed be seen in clinic in person they were directed there.     Subjective   COVID-19 Infection Date:  12/14/2020 confirmed via rapid home antigen test (sx: Fever, Fatigue, Loss or disturbed smell, Headache, Sore throat, Cough, Pain on breathing, Loss or disturbed taste, Muscle pain, Sinus pressure, Shortness of breath, Chest pain, Runny nose, Abdominal pain  - no hospitalization, treated with Augmentin and prednisone)  December 2021 confimed via rapid home antigen test (sx: Fever, Cough, Runny nose, Sore throat, Sinus Pressure - no hospitalization, treated with Augmentin and Prednisone for sinusitis)  Spring 2022 confirmed via rapid home antigen test (sx: Fever, Cough, Runny nose, Sore throat, Sinus Pressure  - no hospitalization, treated with Augmentin and prednisone for sinusitis)  9/20/2023 confirmed via rapid home antigen test (sx: Fever, Cough, Shortness of breath, Fatigue, Pain on breathing, Chest pain, Loss or disturbed smell, Loss or disturbed taste, Runny nose, Headache, Muscle pain, Abdominal pain, Diarrhea, Brain fog, Sore throat, Sinus pressure  - no hospitalization, treated with Prednisone for sinusitis)    COVID-19 vaccine status: Pfizer-BionTuSpeak 11/20/21, 12/11/21, 5/17/22     Occupation: full-time  for [employer] working remotely    Current Providers: PCP Faustina Soria, Cardiology Dr. Aleman, GI MICHAEL Dyson, ENT MICHAEL Salgado and Dr. Richard, Genetics Dr. Pino, [facility]    Survey Scores: 01/2024 -> 07/2024  PHQ-9: 16 -> 15   CHAITANYA-7: 6  -> 6   Sleep Wellness: 9  -> 9   FSS average: 6.56  -> 6.89   Modified Ecog average: 2.17  -> 2.83   MOCA: 21/22 (02/2024)  Overall Health: 50 -> 45 -> 50      39 y.o. female with a h/o COVID-19 in December 2020, December 2021, Spring 2022, September 2023, hypothyroidism, endometriosis, fibromyalgia, POTS, HTN, migraines, obesity, IBS, presents for follow up in the  [facility] c/o GI complaints, dizziness, palpitations and HR irregularities, pain, anxiety and depression, fatigue, brain fog, tinnitus, smell and taste changes, shortness of breath, chest pain, rashes/skin flushing, numbness and tingling, muscle cramps, tremors, muscle weakness    Found out last night that she has Cdiff  Didn't have it when her GI issues started, got 4 antibiotics for that issue and now has Cdiff, that smell started a week ago  Ongoing GI issues are still present, all symptoms clearly point to Crohn's, cracks on lips, lost of mucous, some blood, weight loss, intermittent pain throughout her whole system, either terrible diarrhea or severe diarrhea, has a fissure right now  GI problems make her POTS symptoms worse  Saw GI provider last week that her problem is in the small bowel and that is not evaluated by colonoscopy  Will have enteric CT, if that normal then will have pill camera  Also struggling with silent reflux, esophagus full of precancerous cells  Has to be very careful about what she eats, not symptoms of the reflux so will need endoscopies  Mental health got really bad in the last month, part of that was being isolated   Still working with therapist, interested in joining LC support group  Better last week having been more around people, well supported by friends and family  Utterly exhausted, very miserable, unable to eat well contributes to this, has lost 35lbs  Has not had a full syncopal episode, many pre-syncopal episodes around a dozen times per day  Last time she passed out completely was 2.5-3 months ago  Concerned about steroid use, 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, two times she was on steroids since POTS has made this much worse  Sweating and heat intolerance is much  worse with steroid  Joint pain and body pain has been worse, also deconditioned, difficult to walk up flight of stairs at the end of the day  Tried to start PT two times but had to cancel those due to GI issues  PT recommended aquatherapy  Brain fog has been terrible, working OT on that, cognitive decline has been bad, very forgetful, word finding difficulties  Cannot return to work, would like to extend CASH to February 3rd, then hopes to be able to go part-time at that point because she will lose her position otherwise  Tinnitus and TMJ have been much worse, had PT for that and could not follow up due to GI issues  Still some times that O2 levels are dropping  Still getting chest pain some times, not as many palpitations but also not doing as much  SOB is still there, no improvement  Wondering about MCAS, breaking out in full hives all the time, for as long as she can remember, constant itching legs  Hair and scalp hurts a lot  When she eats her lips swell up and get tingly  Has noted random wheezing or stridor sound, not sick  Getting randomly mucousy as well without other problems  Still getting butterfly rash, took picture when it was bad, some when only on face, some down her neck  Feels that she has Raynauld's symptoms as well  Had biopsy scheduled, didn't move through due to endoscopy being scheduled two days later  Saw eye doctor on Monday, he noted that eyes are pretty dry  Numbness and tingling has been the same, has noticed in the left arm that numbness and cramping is more severe and often, typically at night-time  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 Davis still recommends TST  Muscle cramps and tremors have been around the same, wondeirng about dystonia, that has been her normal, notes that all her life when she flexes her foot she intantly cramps up  Had hearing test done in the  fall    Relevant prior healthcare visits:  -12/2024 OT notes improvement in use of energy conservation techniques, would benefit from further instruction to maximize fatigue management, receptive to instruction on use of internal memory strategies, performed better with linking and chunking than with visualization, would benefit from further instruction on memory strategies  -12/2024 GI notes GI symptoms are improving, CT showed moderate nonspecific colitis and colonoscopy was normal other than hemorrhoids, start Nifedipine for anal fissure, complete CTE to evaluate for small bowel, recheck fecal calprotectin and follow up with CORs if bleeding/pain continues, consider SIBO breath test at follow up, continue PPI  -10/2024 PT eval for jaw and neck pain  -09/2024 ENT for minor salivary gland biopsy for suspected Sjögren's   -08/2024 ENT for tinnitus and TMJ dysfunction, recommend PT and lifestyle strategies  -05/2024 ENT stopped omeprazole for LPR, recommends follow up with Dr. 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 Plan to review hearing test and mother's imaging studies prior to proceeding with genetic testing (EDS genes +/- Stickler syndrome genes +/- aortopathy genes). Referred to PM&R  -05/2024 ENT Dr. Richard for left chronic sinusitis and odontogenic sinusitis, recommends dental evaluation  -03/2024 Cardiology Dr. Kam ordered cPET for PASC-related exercise intolerance, chest pain, tachycardia, DELGADO  -02/2024 PCP prescribed Flonase and medrol dose pack for sinus complaint  -12/2023 ED for syncope in shower and foot injury  -10/2023 GI ordered fibroscan for fatty liver, fiber supplement and miralax for IBS-C, omeprazole and gallbladder US for RUQ abdominal pain, referred to cardiology for HTN and  tachycardia  -11/2022 Sleep Medicine [facility] EDS, recommends follow-up with rheumatology as scheduled, PSG and MSLT    Relevant prior diagnostic studies:  -11/2024 EGD/colonoscopy shows GERD, mild chronic nonspecific gastritis, neg H. Pylori  -09/2024 EMG unremarkable except for presence of mild chronic neurogenic changes in 2 muscles innervated by the C8 segment/root, not sufficient for a definite diagnosis of a cervical radiculopathy at this level  -09/2024 QSART not consistent with a definite postganglionic sympathetic sudomotor abnormality like that seen in autonomic/small fiber neuropathy, isolated decreased sweat output at the distal leg is of unclear significance  -09/2024 CT a/p shows moderate nonspecific colitis  -05/2024 CT sinus shows complete opacification of the left ostiomeatal unit and resultant severe mucoasal disease of the left maxillary sinus and left anterior ethmoid air cells  -04/2024 HSAT shows very mild ED with O2 garret 83.1  -02/2024 autonomic testing consistent with POTS  -02/2024 CTA chest shows no PE and clear lungs  -12/2023 CXR with clear lungs  -11/2023 PFTs normal  -11/2023 FibroSCAN shows stage 0-1 disease and severe steatosis  -11/2023 echocardiogram with LVEF 50%, borderline pulmonary HTN with RVSP 33-35 mmHg, endocardial walls not well-visualized leading to inability to accurately calculation of EF with possible mild hypokinesis in the anterior lateral wall but this is very poorly visualized  -10/2023 US gallbladder unremarkable, prominent liver  -10/2023 CT abdomen and pelvis unremarkable   -03/2023 colonoscopy normal    Relevant prior laboratory values, unremarkable unless noted:  -12/2024 CBC, creatinine  -10/2024 stool cultures, calprotectin elevated, TTG, CRP 1.23  -09/2024 CMP, CBC/D with WBC 16, Mag, Lipase, Vitamin B12 350, Early Sjögren's Ab, VGCC Ab panel  -05/2024 Vitamin B12 250, Vitamin D 38  -02/2024 GGT, cryoglobulin, urine porphobilinogen, urine metanephrines, urine  organic acids with elevation in lactic acid, urine copper, UPEP, aldolase, catecholamines, carnitine, TPO ab, Dys-2, Vitamin E, Anti-thyroglobulin ab, T3, T4, anti-parietal cell ab, paraneoplastic ab, mycoplasma pn IgM negative and IgG positive, MMA, homocysteine, serum copper, CoQ10, Mag, Vitamin D 13, Vitamin B12 302, CMP, CK, BNP, CCP, iron studies, TSH, ESR 34, RF, D-Dimer 840, CBC/D, HbA1c, Folate, Ferritin, CRP 1.28, JEET, am cortisol, Vitamin B6, Vitamin B1, Troponin, SPEP, Tryptase  -12/2023 Troponin, CMP, CBC/D  -10/2023 iron studies, Hepatitis panel, Anti-smooth muscle ab, anti-mitochondrial ab, [id number], Mag, Lipase, D-Dimer  705, Vitamin D, TSH  -10/2022 ferritin, T4, vitamin B12 377, JEET, CCP ab, sjogren ab, TPO ab, thyroglubin ab  -05/2022 HbA1c 4.8%, RF, CRP 1.3, ESR, uric acid    Exercise routine: none  Diet:  Weight hx: pre-COVID-19 230  lbs -> post-COVID-19 255  lbs -> 250 lbs -> 245 lbs  Substance use: former tobacco use, 1-2 servings ETOH monthly or less   Social:       Current Outpatient Medications:     azelastine (Astelin) 137 mcg (0.1 %) nasal spray, Administer 1 spray into each nostril 2 times a day. Use in each nostril as directed, Disp: , Rfl:     cetirizine (ZyrTEC) 10 mg chewable tablet, Chew once daily., Disp: , Rfl:     cyclobenzaprine (Flexeril) 10 mg tablet, Take 1 tablet (10 mg) by mouth 3 times a day as needed for muscle spasms., Disp: , Rfl:     docusate sodium (Colace) 100 mg capsule, Take 1 capsule (100 mg) by mouth 2 times a day., Disp: 60 capsule, Rfl: 0    DULoxetine (Cymbalta) 20 mg DR capsule, Take 1 capsule (20 mg) by mouth 2 times a day., Disp: , Rfl:     fluticasone (Flonase) 50 mcg/actuation nasal spray, Administer 1 spray into each nostril once daily. Shake gently. Before first use, prime pump. After use, clean tip and replace cap., Disp: 16 g, Rfl: 5    hyoscyamine (Anaspaz, Levsin) 0.125 mg tablet, Take 1 tablet (0.125 mg) by mouth every 6 hours if  needed for cramping or diarrhea., Disp: 100 tablet, Rfl: 11    ibuprofen 800 mg tablet, Take 1 tablet (800 mg) by mouth every 6 hours if needed for headaches, mild pain (1 - 3) or moderate pain (4 - 6)., Disp: , Rfl:     levothyroxine (Synthroid, Levoxyl) 50 mcg tablet, Take 1 tablet (50 mcg) by mouth once daily., Disp: 30 tablet, Rfl: 1    lisinopril 20 mg tablet, Take 1 tablet (20 mg) by mouth once daily., Disp: 90 tablet, Rfl: 0    meloxicam (Mobic) 15 mg tablet, Take 1 tablet (15 mg) by mouth once daily as needed for moderate pain (4 - 6)., Disp: 30 tablet, Rfl: 0    methylphenidate ER (CONCERTA) 27 mg oral extended release tablet, Take 1 tablet (27 mg) by mouth once daily in the morning., Disp: , Rfl:     NIFEdipine (bulk), Apply topically 2 times a day., Disp: 30 g, Rfl: 0    NIFEdipine (bulk), Apply topically 2 times a day for 14 days., Disp: 30 g, Rfl: 0    NON FORMULARY, Place 1 each under the tongue once daily. B12 5000 MCG, Disp: , Rfl:     omeprazole (PriLOSEC) 20 mg DR capsule, Take 1 capsule (20 mg) by mouth once daily in the morning. Take before meals. Do not crush or chew., Disp: 30 capsule, Rfl: 3    propranolol (Inderal) 10 mg tablet, TAKE 1 TABLET BY MOUTH TWICE A DAY, Disp: 180 tablet, Rfl: 0    vancomycin (Vancocin) 125 mg capsule, Take 1 capsule (125 mg) by mouth 4 times a day for 10 days., Disp: 40 capsule, Rfl: 0    wheat dextrin (Benefiber Healthy Shape) 5 gram/7.4 gram powder, Take 1 teaspoon daily, Disp: 248 g, Rfl: 1    Past Medical History:   Diagnosis Date    ADHD     Allergic     Avascular necrosis of bone (Multi) 2019    both hips, from steroids    Disease of thyroid gland     Fibromyalgia     Hypertension     POTS (postural orthostatic tachycardia syndrome)        Past Surgical History:   Procedure Laterality Date    HYSTERECTOMY      1 ovary left    LABYRINTHECTOMY      OTHER SURGICAL HISTORY  01/07/2020    Laparoscopic hysterectomy    RENAL BIOPSY         Family History   Problem  Relation Name Age of Onset    No Known Problems Mother         Objective   There were no vitals taken for this visit.    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 Davis's recommendations, other labs added as well  -we will plan on resuming TMJ PT and aquatherapy after GI issues settle down  -referral to [facility] Support [facility]  -I would like to refer you to Dr. Dwayne Burrell (Dr. ESTEBAN) with Allergy/Immunology in [place] for further evaluation of hives/itching and frequent [id number]. Please call [phone] to set up an appointment.   -referral to Dr. Morocho with Rheumatology for concern of underlying autoimmune process  -make a FUV with Dr. Davis   -make a FUV with Marisrajan, bring imaging results of your mother's to that appointment    08/2024:  Visit in the autonomic clinic with Dr. Davis for post-COVID POTS showed concern for a possible underlying autoimmune etiology given sicca symptoms, progressive sensation changes, family history of autoimmune disease. Dr. Davis recommends the following next steps:  -repeat QSART, if negative then will proceed with TST  -lip biopsy to rule in/out Sjogren's  -additional bloodwork ordered by Dr. Davis, also repeat Vitamin B12 level   -continue mitochondrial cocktail  -pn/myopathy EMG  -skin biopsy to rule in/out small fiber neuropathy  -continue to focus on  conservative measures    08/2024:  GI complaints, dizziness, palpitations and HR irregularities, pain, anxiety and depression, fatigue, brain fog, tinnitus, smell and taste changes, shortness of breath, chest pain, rashes/skin flushing, numbness and tingling, muscle cramps, tremors, muscle weakness, weight loss goal  -reach out to your GI provider regarding constipation, nausea, and blood in stool. Stop Zofran as this is worsening constipation  -increase Lexapro to 20mg daily for anxiety and depression, follow up with therapy and psychiatry as planned  -resources for ADHD testing provided  -continue conservative measures for POTS management, Vitamin B12 supplement, mitochondrial cocktail. Follow up with Dr. Davis as scheduled later this week.    04/2024:   sinus complaints, GI complaints, dizziness, palpitations and HR irregularities, pain, anxiety and depression, fatigue, brain fog, headache, tinnitus, smell and taste changes, shortness of breath, cough, chest pain, rashes/skin flushing, numbness and tingling, muscle cramps, tremors, muscle weakness, weight loss goal  -repeat Vitamin D and B12 level in 2-3 weeks  -smell retraining therapy   -you can send paperwork for intermittent FMLA to [email or web address] or Fax Number [phone]   -try [employer] to help further with LPR  -reach out to GI about stool changes  -discuss ADHD treatment option with your PCP  -consultation with Dr. Davis in autonomic clinic will be scheduled on June 13th  -start mitchondrial cocktail, this will be emailed to you as well  -continue propranolol at 10mg three times daily and use conservative measures to help with POTS  -continue Lexapro at current dose and follow-up with Psychology as scheduled  -proceed with CPET as recommended by cardiology  -follow-up with genetics for concern for EDS  -follow-up with Neurology as scheduled for headaches and tremors  -we will consider OT and PT for physical and  "[facility] after work-up is completed  -additional recommendations provided under \"Patient Education\" section   -> increase B12 supplement to 5000mcg daily    02/2024: sinus complaints, GI complaints, dizziness, palpitations and HR irregularities, pain, anxiety and depression, fatigue, brain fog, headache, tinnitus, smell and taste changes, shortness of breath, cough, chest pain, rashes/skin flushing, numbness and tingling, muscle cramps, tremors, muscle weakness  -comprehensive blood work, please have this drawn in the morning, fasting except for water   -tilt table test to rule out dysautonomia as a cause of your dizziness   -Home Sleep Study to rule out sleep apnea as a cause/contribution to your symptoms   -diaphragmatic breathing exercises  -referral to Cardiologist Dr. Fabian Kam  -restart propranolol at 10mg twice daily, we will consider increasing this dose depending on how you feel   -for anxiety and depression, I prescribed Lexapro 10mg daily and [facility] Nu [facility]patrick  -for sinus complaints, I referred you to rhinology   -I will reach out to the  neurologist with whom you are scheduled next month to ask if they treat dysautonomia  -> D supplement, B12 supplement         Relevant Orders    Vitamin D 25-Hydroxy,Total (for eval of Vitamin D levels)    Vitamin B12    Vitamin B6    Basic Metabolic Panel    CBC    Magnesium    Methylmalonic acid, serum    Referral to Psychology     Other Visit Diagnoses         Codes    Musculoskeletal pain     M79.18    Relevant Orders    Referral to Rheumatology    Hives     L50.9    Relevant Orders    Referral to Immunology          "

## 2024-12-16 ENCOUNTER — DOCUMENTATION WITH CHARGES (OUTPATIENT)
Dept: OTHER | Facility: CLINIC | Age: 39
End: 2024-12-16
Payer: COMMERCIAL

## 2024-12-16 DIAGNOSIS — M25.50 POLYARTHRALGIA: ICD-10-CM

## 2024-12-16 RX ORDER — MELOXICAM 15 MG/1
15 TABLET ORAL DAILY PRN
Qty: 30 TABLET | Refills: 0 | Status: SHIPPED | OUTPATIENT
Start: 2024-12-16 | End: 2025-01-15

## 2024-12-16 RX ORDER — LEVOTHYROXINE SODIUM 50 UG/1
50 TABLET ORAL DAILY
Qty: 90 TABLET | Refills: 1 | OUTPATIENT
Start: 2024-12-16

## 2024-12-16 NOTE — TELEPHONE ENCOUNTER
Medication refused due to failing protocol.    Requested Prescriptions   Pending Prescriptions Disp Refills    levothyroxine (Synthroid, Levoxyl) 50 mcg tablet [Pharmacy Med Name: LEVOTHYROXINE 50 MCG TABLET] 90 tablet 1     Sig: TAKE 1 TABLET BY MOUTH ONCE DAILY.        Thyroid Hormones Protocol Failed - 12/16/2024 12:50 PM        Failed - Medication not refilled in past 45 days (1.5 months)     Matching medication order placed on 11/20/2024  1:28 PM  Order [id number]: levothyroxine (Synthroid, Levoxyl) 50 mcg tablet  (For orders placed between 11/1/2024 12:50 PM and 12/16/2024 12:50 PM)            Passed - Normal TSH in past 12 months     Thyroid Stimulating Hormone   Date Value Ref Range Status   02/16/2024 3.38 0.44 - 3.98 mIU/L Final             Passed - No active pregnancy on record        Passed - No pregnancy test in the past 12 months or most recent test was negative        Passed - Visit with relevant provider in past 12 months or upcoming 90 days     Recent Visits  Date Type Provider Dept   02/05/24 Office Visit Faustina Soria PA-C [id number]   01/08/24 Office Visit Faustina Soria PA-C [id number]   Showing recent visits within past 365 days and meeting all other requirements  Future Appointments  Date Type Provider Dept   01/06/25 Appointment Faustina Soria PA-C [id number]   Showing future appointments within next 90 days and meeting all other requirements

## 2024-12-17 ENCOUNTER — EVALUATION (OUTPATIENT)
Dept: PHYSICAL THERAPY | Facility: HOSPITAL | Age: 39
End: 2024-12-17
Payer: COMMERCIAL

## 2024-12-17 ENCOUNTER — LAB (OUTPATIENT)
Dept: LAB | Facility: LAB | Age: 39
End: 2024-12-17
Payer: COMMERCIAL

## 2024-12-17 DIAGNOSIS — M24.80 GENERALIZED HYPERMOBILITY OF JOINTS: ICD-10-CM

## 2024-12-17 DIAGNOSIS — R19.7 DIARRHEA, UNSPECIFIED TYPE: ICD-10-CM

## 2024-12-17 DIAGNOSIS — R19.5 ELEVATED FECAL CALPROTECTIN: ICD-10-CM

## 2024-12-17 DIAGNOSIS — M79.7 FIBROMYALGIA: ICD-10-CM

## 2024-12-17 DIAGNOSIS — M25.50 POLYARTHRALGIA: ICD-10-CM

## 2024-12-17 PROCEDURE — 97161 PT EVAL LOW COMPLEX 20 MIN: CPT | Mod: GP | Performed by: PHYSICAL THERAPIST

## 2024-12-17 PROCEDURE — 87493 C DIFF AMPLIFIED PROBE: CPT

## 2024-12-17 PROCEDURE — 83993 ASSAY FOR CALPROTECTIN FECAL: CPT

## 2024-12-17 ASSESSMENT — PAIN - FUNCTIONAL ASSESSMENT: PAIN_FUNCTIONAL_ASSESSMENT: 0-10

## 2024-12-17 NOTE — PROGRESS NOTES
"Physical Therapy    Physical Therapy Evaluation and Treatment      Patient Name: Rajani Morton \"Rachana\"  MRN: [id number]  Today's Date: 12/17/2024    Time Entry:   Time Calculation  Start Time: 0330  Stop Time: 0415  Time Calculation (min): 45 min  PT Evaluation Time Entry  PT Evaluation (Low) Time Entry: 40                      Assessment:  This 40 yo pleasant female is present today with the diagnosis of polyarthralgia, generalized hypermobility of joints and fibromyalgia.  She got Covid in Sept 2023 and since then was diagnosed with POTS.  She's having multiple testing for crohn's disease/colitis and was 1 point away from being diagnosed with Claudio-Danlos.  She has (B) AVN.  She reports chronic fatigue and weakness since her getting Covid last Sept 2023.  She has WNL's (B) LE/UE AROM noting hypermobility in all her joints.  4-4+/5 gross (B) UE/LE strength.  Poor endurance and fatigues walking up and down her stairs at home.  She currently is on medical leave from work.  She having difficulty performing her instrumental ADL's and work activities around the house.  She reports consuming around 700-800 calories/day secondary to her GI issues.  She's an excellent candidate for aquatic therapy.  Her genetic specialist, Dr. Roscoe Pino wanted her to start some aquatic PT.       Plan:  Continue skilled PT in aquatic therapy with Carole Jurado.        Current Problem:   1. Generalized hypermobility of joints  Referral to Physical Therapy    Follow Up In Physical Therapy      2. Polyarthralgia  Referral to Physical Therapy    Follow Up In Physical Therapy      3. Fibromyalgia  Referral to Physical Therapy    Follow Up In Physical Therapy          Subjective  Pt reports chronic fatigue and weakness since getting Covid in Sept 2023.      Pain:  Pain Assessment  Pain Assessment: 0-10  Pain Type: Chronic pain  Pain Location: Hip  Pain Orientation: Right, Left    Objective   AROM:  (B) UE/LE WNL's and relatively painfree. "  She does have some (B) hip pain with AROM secondary to AVN.     Strength:  4-4+/5 (B) LE strength     Posture:  Increased lumbar lordosis in standing and decreased in sitting.  Mild forward head alignment in sitting.      Palpation:  Diffuse tenderness in her UE > LE's    Functional Mobility:  Independent with transfers.      Balance:   Good standing and walking tolerances    Special Tests:    Outcome Measures:  Other Measures  Lower Extremity Funtional Score (LEFS): 38/80     Treatments:    NuStep, L3, 10 minutes        Goals:    Decrease diffuse body pain/discomfort.  Increase (B) LE strength to 5/5.  Pt tolerate 30-45 minutes of exercise.  Pt able to walk stairs in her home without getting fatigued.  Pt able to do her instrumental ADL's and work activities around the home.

## 2024-12-18 DIAGNOSIS — A04.72 C. DIFFICILE COLITIS: Primary | ICD-10-CM

## 2024-12-18 LAB — C DIF TOX TCDA+TCDB STL QL NAA+PROBE: DETECTED

## 2024-12-18 RX ORDER — VANCOMYCIN HYDROCHLORIDE 125 MG/1
125 CAPSULE ORAL 4 TIMES DAILY
Qty: 40 CAPSULE | Refills: 0 | Status: SHIPPED | OUTPATIENT
Start: 2024-12-18 | End: 2024-12-28

## 2024-12-19 ENCOUNTER — TELEMEDICINE (OUTPATIENT)
Dept: OTHER | Facility: CLINIC | Age: 39
End: 2024-12-19
Payer: COMMERCIAL

## 2024-12-19 ENCOUNTER — APPOINTMENT (OUTPATIENT)
Dept: PHYSICAL THERAPY | Facility: CLINIC | Age: 39
End: 2024-12-19
Payer: COMMERCIAL

## 2024-12-19 DIAGNOSIS — M79.18 MUSCULOSKELETAL PAIN: ICD-10-CM

## 2024-12-19 DIAGNOSIS — U09.9 POST-ACUTE SEQUELAE OF COVID-19 (PASC): Primary | ICD-10-CM

## 2024-12-19 DIAGNOSIS — L50.9 HIVES: ICD-10-CM

## 2024-12-19 NOTE — PATIENT INSTRUCTIONS
It was my pleasure seeing you in the [facility] today.  We will focus on addressing the following symptoms discussed today: GI complaints, dizziness, palpitations and HR irregularities, pain, anxiety and depression, fatigue, brain fog, tinnitus, smell and taste changes, shortness of breath, chest pain, rashes/skin flushing, numbness and tingling, muscle cramps, tremors, muscle weakness    My recommendations are as follows:  -repeat MMA and Vitamin B12 per Dr. Davis's recommendations, other labs added as well  -we will plan on resuming TMJ PT and aquatherapy after GI issues settle down  -referral to [facility] Psychology Support Group  -I would like to refer you to Dr. Dwayne Burrell (Dr. ESTEBAN) with Allergy/Immunology in [place] for further evaluation of hives/itching and frequent [id number]. Please call [phone] to set up an appointment.   -referral to Dr. Morocho with Rheumatology for concern of underlying autoimmune process  -make a FUV with Dr. Davis   -make a FUV with Odette, bring imaging results of your mother's to that appointment    conservative measures to help with dysautonomia symptoms:  --drinking 1 gallon of water/day (or a mix of fluids, non sugary and minimally caffeinated)   --Eating 6g of table salt (3 tsp) in addition to salt used in cooking of naturally present in food. Avoid salt tablets and monitor BP closely  --Wearing compression stockings, grade 40-50 mmHg up to the waist every day, to be removed before bedtime  --Sleeping with the head of the bed up 45 degrees, even for short naps. Avoid stacking pillow or wedges under the neck or the back. Bend the mattress in the middle and raise the head part and stack bricks underneath. Or invest  in an adjustable bed.  --Water jogging (ie. running in the shallow part of the pool, water waist or chest level), 1 hour a day or 90 minutes 3 times a week.     Tips to help improve brain fog and fatigue:  --avoid drinking Alcohol  while recovering from Long COVID  --Focus on eating whole foods to help support your gut and immune system. Aim to eat 30 different plants per week (vegetables, fruits, beans, nuts, legumes, seeds, whole grains, herbs, spices). Avoid processed foods and beverages. Eliminate added sugars, artificial sweeteners, processed oils, artificial dyes.  --ensure to practice 30 minutes of exercise 7 days per week to keep BNDGF (brain derived neurotrophic growth factor) elevated as this will help in the regeneration of neurons, you may split exercise time up into 5 minute increments if this is better tolerated.   --slowly increase your activity by no more than 10% per week, rest when you feel tired  --utilize pacing techniques to manage fatigue, schedule rest times throughout the day so you do not run out of energy, more information to be found on this here: [email or web address]/health-info-site/[email or web address]_covid-19_fatigue.pdf  --use the “attention beam” strategy to help you focus on some things while ignoring others. Imagine a flashlight beam illuminating the task that you are trying to focus on while leaving everything else in the dark.  --minimize external distractions to help with attention. For example, turn off the TV, radio, music, heater, and other electrical equipment, and close the window to screen out traffic noise. Complete important or difficult tasks in a quiet room if possible. Switch off mobile phones, switch off automatic email notifications. Use ear plugs if necessary or noise-cancelling headphones. Reduce visual distractions by clearing off your work-space, sit opposite to a window. Make sure lighting in the workspace is adequate.  --minimize internal distractions to help with attention. Thoughts, feelings, and physical sensations such as hunger or pain can be distracting. When you notice your attention beam is directed toward a thought or sensation, gently direct your attention back to the central focal  "point. You can also practice mindfulness and/or meditation to help reduce internal distractions  --games that can be tried to help improve memory and attention are Brain HQ and N-Back games  --mindfulness and meditation can be learned with the smartphone apps “Unwinding Anxiety” and “Headspace”  --Review the  Health Talk on Managing Fatigue and Thinking Changes after COVID-19 here: https://www.hospitals.org/Health-Talks/articles/2022/05/managing-fatigue-and-thinking-changes-after-covid-19  --You can also find many helpful tips and tricks in this book: \"The Long COVID self-help guide, practical ways to manage symptoms\" by [employer] from the [facility] [place]  --additional apps, books, and podcasts for patients suffering from Long COVID can be found at https://www.[facility]/public-health/long-covid/long-covid-apps-books-podcasts/     To help improve sleep:  --try Melatonin children's liquid drops 1-2mg underneath your tongue 30 minutes before you go to bed  --go to bed at the same time each night and get up at the same time each morning, including the weekend  --goal of 7-9 hours of uninterrupted sleep per night  --make sure your bedroom is quiet, dark, relaxing, and at a comfortable temperature  --remove electronic devices, such as TVs, computers, and smart phones, from your bedroom  --avoid caffeine after the morning and large meals before bed  --drink plenty of water throughout the day but avoid drinking fluids two hours before bed  --expose yourself to bright light in the morning  --engage in a calming bed-time routine of warm bath/shower, gratitude journal, guided meditation, etc.  --do not lay awake in bed for more than 20 minutes, get up and engage in a soothing activity such as reading a boring book until you feel sleepy     We will send a message in buuteeq or call you with the results of your tests.  Further recommendations will follow based on testing results and your " symptoms.   Please return to [facility] in 3 months, call [phone] or send a message through your [employer] luiz if needed.    If you are interested in joining a clinical trial, look into the following resources:  https://clinicaltrials.gov/  [email or web address].[email or web address]  [email or web address]/clinical-trials/

## 2024-12-19 NOTE — ASSESSMENT & PLAN NOTE
12/2024:  GI complaints, dizziness, palpitations and HR irregularities, pain, anxiety and depression, fatigue, brain fog, tinnitus, smell and taste changes, shortness of breath, chest pain, rashes/skin flushing, numbness and tingling, muscle cramps, tremors, muscle weakness  -repeat MMA and Vitamin B12 per Dr. Davis's recommendations, other labs added as well  -we will plan on resuming TMJ PT and aquatherapy after GI issues settle down  -referral to [facility] Support [facility]  -I would like to refer you to Dr. Dwayne Burrell (Dr. ESTEBAN) with Allergy/Immunology in [place] for further evaluation of hives/itching and frequent [id number]. Please call [phone] to set up an appointment.   -referral to Dr. Morocho with Rheumatology for concern of underlying autoimmune process  -make a FUV with Dr. 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 Dr. Davis recommends the following next steps:  -repeat QSART, if negative then will proceed with TST  -lip biopsy to rule in/out Sjogren's  -additional bloodwork ordered by Dr. Davis, also repeat Vitamin B12 level   -continue mitochondrial cocktail  -pn/myopathy EMG  -skin biopsy to rule in/out small fiber neuropathy  -continue to focus on conservative measures    08/2024:  GI complaints, dizziness, palpitations and HR irregularities, pain, anxiety and depression, fatigue, brain fog, tinnitus, smell and taste changes, shortness of breath, chest pain, rashes/skin flushing, numbness and tingling, muscle cramps, tremors, muscle weakness, weight loss goal  -reach out to your GI provider regarding constipation, nausea, and blood in stool. Stop Zofran as this is worsening  "constipation  -increase Lexapro to 20mg daily for anxiety and depression, follow up with therapy and psychiatry as planned  -resources for ADHD testing provided  -continue conservative measures for POTS management, Vitamin B12 supplement, mitochondrial cocktail. Follow up with Dr. Davis as scheduled later this week.    04/2024:   sinus complaints, GI complaints, dizziness, palpitations and HR irregularities, pain, anxiety and depression, fatigue, brain fog, headache, tinnitus, smell and taste changes, shortness of breath, cough, chest pain, rashes/skin flushing, numbness and tingling, muscle cramps, tremors, muscle weakness, weight loss goal  -repeat Vitamin D and B12 level in 2-3 weeks  -smell retraining therapy   -you can send paperwork for intermittent FMLA to [email or web address] or Fax Number [phone]   -try [employer] to help further with LPR  -reach out to GI about stool changes  -discuss ADHD treatment option with your PCP  -consultation with Dr. Davis in autonomic clinic will be scheduled on June 13th  -start mitchondrial cocktail, this will be emailed to you as well  -continue propranolol at 10mg three times daily and use conservative measures to help with POTS  -continue Lexapro at current dose and follow-up with Psychology as scheduled  -proceed with CPET as recommended by cardiology  -follow-up with genetics for concern for EDS  -follow-up with Neurology as scheduled for headaches and tremors  -we will consider OT and PT for physical and cognitive Long COVID Rehabilitation after work-up is completed  -additional recommendations provided under \"Patient Education\" section   -> increase B12 supplement to 5000mcg daily    02/2024: sinus complaints, GI complaints, dizziness, palpitations and HR irregularities, pain, anxiety and depression, fatigue, brain fog, headache, tinnitus, smell and taste changes, shortness of breath, cough, chest pain, rashes/skin flushing, numbness " and tingling, muscle cramps, tremors, muscle weakness  -comprehensive blood work, please have this drawn in the morning, fasting except for water   -tilt table test to rule out dysautonomia as a cause of your dizziness   -Home Sleep Study to rule out sleep apnea as a cause/contribution to your symptoms   -diaphragmatic breathing exercises  -referral to Cardiologist Dr. Fabian Kam  -restart propranolol at 10mg twice daily, we will consider increasing this dose depending on how you feel   -for anxiety and depression, I prescribed Lexapro 10mg daily and referred you to [facility] Psychologist Nu Fitzpatrick  -for sinus complaints, I referred you to rhinology   -I will reach out to the  neurologist with whom you are scheduled next month to ask if they treat dysautonomia  -> D supplement, B12 supplement

## 2024-12-19 NOTE — PROGRESS NOTES
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

## 2024-12-21 LAB — CALPROTECTIN STL-MCNT: 69 UG/G

## 2024-12-23 ENCOUNTER — APPOINTMENT (OUTPATIENT)
Dept: RADIOLOGY | Facility: HOSPITAL | Age: 39
End: 2024-12-23
Payer: COMMERCIAL

## 2024-12-24 ENCOUNTER — APPOINTMENT (OUTPATIENT)
Dept: OCCUPATIONAL THERAPY | Facility: HOSPITAL | Age: 39
End: 2024-12-24
Payer: COMMERCIAL

## 2025-01-03 ENCOUNTER — APPOINTMENT (OUTPATIENT)
Dept: RADIOLOGY | Facility: HOSPITAL | Age: 40
End: 2025-01-03
Payer: COMMERCIAL

## 2025-01-06 ENCOUNTER — APPOINTMENT (OUTPATIENT)
Dept: PRIMARY CARE | Facility: CLINIC | Age: 40
End: 2025-01-06
Payer: COMMERCIAL

## 2025-01-07 ENCOUNTER — APPOINTMENT (OUTPATIENT)
Dept: PHYSICAL THERAPY | Facility: CLINIC | Age: 40
End: 2025-01-07
Payer: COMMERCIAL

## 2025-01-07 DIAGNOSIS — E03.9 HYPOTHYROIDISM, ACQUIRED: ICD-10-CM

## 2025-01-07 RX ORDER — LEVOTHYROXINE SODIUM 50 UG/1
50 TABLET ORAL DAILY
Qty: 30 TABLET | Refills: 1 | OUTPATIENT
Start: 2025-01-07

## 2025-01-07 NOTE — TELEPHONE ENCOUNTER
Medication refused due to failing protocol.    Requested Prescriptions   Pending Prescriptions Disp Refills    levothyroxine (Synthroid, Levoxyl) 50 mcg tablet [Pharmacy Med Name: LEVOTHYROXINE 50 MCG TABLET] 30 tablet 1     Sig: TAKE 1 TABLET BY MOUTH ONCE DAILY.        Thyroid Hormones Protocol Passed - 1/7/2025  1:03 PM        Passed - Normal TSH in past 12 months     Thyroid Stimulating Hormone   Date Value Ref Range Status   02/16/2024 3.38 0.44 - 3.98 mIU/L Final             Passed - No active pregnancy on record        Passed - No pregnancy test in the past 12 months or most recent test was negative        Passed - Visit with relevant provider in past 12 months or upcoming 90 days     Recent Visits  Date Type Provider Dept   02/05/24 Office Visit Faustina Soria PA-C Do [id number]   01/08/24 Office Visit Faustina Soria PA-C Do [id number]   Showing recent visits within past 365 days and meeting all other requirements  Future Appointments  No visits were found meeting these conditions.  Showing future appointments within next 90 days and meeting all other requirements              Passed - Medication not refilled in past 45 days (1.5 months)     No matching medication orders between 11/23/2024  1:03 PM and 1/7/2025  1:03 PM

## 2025-01-13 ENCOUNTER — APPOINTMENT (OUTPATIENT)
Dept: PRIMARY CARE | Facility: CLINIC | Age: 40
End: 2025-01-13
Payer: COMMERCIAL

## 2025-01-14 ENCOUNTER — APPOINTMENT (OUTPATIENT)
Dept: PHYSICAL THERAPY | Facility: CLINIC | Age: 40
End: 2025-01-14
Payer: COMMERCIAL

## 2025-01-16 ENCOUNTER — APPOINTMENT (OUTPATIENT)
Dept: PHYSICAL THERAPY | Facility: CLINIC | Age: 40
End: 2025-01-16
Payer: COMMERCIAL

## 2025-01-20 ENCOUNTER — APPOINTMENT (OUTPATIENT)
Dept: PHYSICAL THERAPY | Facility: CLINIC | Age: 40
End: 2025-01-20
Payer: COMMERCIAL

## 2025-01-22 DIAGNOSIS — A04.72 C. DIFFICILE DIARRHEA: Primary | ICD-10-CM

## 2025-01-23 ENCOUNTER — APPOINTMENT (OUTPATIENT)
Dept: SURGERY | Facility: CLINIC | Age: 40
End: 2025-01-23
Payer: COMMERCIAL

## 2025-01-23 ENCOUNTER — APPOINTMENT (OUTPATIENT)
Dept: PHYSICAL THERAPY | Facility: CLINIC | Age: 40
End: 2025-01-23
Payer: COMMERCIAL

## 2025-01-27 ENCOUNTER — APPOINTMENT (OUTPATIENT)
Dept: PHYSICAL THERAPY | Facility: CLINIC | Age: 40
End: 2025-01-27
Payer: COMMERCIAL

## 2025-02-12 ENCOUNTER — OFFICE VISIT (OUTPATIENT)
Dept: URGENT CARE | Age: 40
End: 2025-02-12
Payer: COMMERCIAL

## 2025-02-12 VITALS
RESPIRATION RATE: 18 BRPM | HEIGHT: 69 IN | BODY MASS INDEX: 32.58 KG/M2 | WEIGHT: 220 LBS | TEMPERATURE: 98 F | HEART RATE: 91 BPM | OXYGEN SATURATION: 98 % | DIASTOLIC BLOOD PRESSURE: 90 MMHG | SYSTOLIC BLOOD PRESSURE: 120 MMHG

## 2025-02-12 DIAGNOSIS — M54.10 RADICULOPATHY, UNSPECIFIED SPINAL REGION: Primary | ICD-10-CM

## 2025-02-12 RX ORDER — METHOCARBAMOL 750 MG/1
750 TABLET, FILM COATED ORAL 3 TIMES DAILY PRN
Qty: 30 TABLET | Refills: 0 | Status: SHIPPED | OUTPATIENT
Start: 2025-02-12

## 2025-02-12 RX ORDER — IBUPROFEN 800 MG/1
800 TABLET ORAL EVERY 6 HOURS PRN
Qty: 30 TABLET | Refills: 0 | Status: SHIPPED | OUTPATIENT
Start: 2025-02-12

## 2025-02-12 NOTE — PROGRESS NOTES
"Subjective   Patient ID: Rajani Morton \"Jared" is a 39 y.o. female who presents for pinched nerve (Pt thinks she has a pinched nerve in her left neck/ shoulder area. Happened a week ago but experiencing numbness, tingling, shooting pains, intermittent pains and cannot sleep from pain. Pain has gotten worse.).  HPI  Presents for left-sided cervical paravertebral muscle pain with radiation down the arm.  Patient reports 1 week of this without known precipitating event.  No trauma, fall, direct blow, or similar prior experience.  Patient reports associated intermittent paresthesias of the hand.  No reported attempted conservative management.  No exacerbating factors.  No other complaints.    Review of Systems    Constitutional:  See HPI     Musculoskeletal: See HPI  Neurologic:  Alert and oriented X4, No numbness, No tingling.    All other systems are negative     Objective     /90   Pulse 91   Temp 36.7 °C (98 °F) (Temporal)   Resp 18   Ht 1.753 m (5' 9\")   Wt 99.8 kg (220 lb)   LMP 11/01/2019   SpO2 98%   BMI 32.49 kg/m²     Physical Exam    General:  Alert and oriented, No acute distress.    Eye:  Pupils are equal, round and reactive to light, Normal conjunctiva.    HENT:  Normocephalic,   Neck:  Supple    Respiratory: Respirations are non-labored   Musculoskeletal: Normal ROM and strength of the cervical spine; some pain elicited with forward flexion; tenderness to palpation over bilateral cervical paravertebral muscles, left greater than right; equal  strength; good accessory; gross sensory intact  Integumentary:  Warm, Dry, Intact, No pallor, No rash.    Neurologic:  Alert, Oriented, Normal sensory, Cranial Nerves II-XII are grossly intact  Psychiatric:  Cooperative, Appropriate mood & affect.    Assessment/Plan   Exam is consistent with cervical strain with radiculopathy.  Prescriptions for Robaxin and ibuprofen.  Ice and rest otherwise.  Patient's clinical presentation is otherwise " unremarkable at this time. Patient is discharged with instructions to follow-up with primary care or seek emergency medical attention for worsening symptoms or any new concerns.  Problem List Items Addressed This Visit    None  Visit Diagnoses       Radiculopathy, unspecified spinal region    -  Primary    Relevant Medications    methocarbamol (Robaxin) 750 mg tablet    ibuprofen (IBU) 800 mg tablet            Final diagnoses:   [M54.10] Radiculopathy, unspecified spinal region

## 2025-02-21 DIAGNOSIS — R42 DIZZINESS: ICD-10-CM

## 2025-02-21 DIAGNOSIS — R51.9 ACUTE NONINTRACTABLE HEADACHE, UNSPECIFIED HEADACHE TYPE: Primary | ICD-10-CM

## 2025-02-21 DIAGNOSIS — R20.0 NUMBNESS AND TINGLING: ICD-10-CM

## 2025-02-21 DIAGNOSIS — R20.2 NUMBNESS AND TINGLING: ICD-10-CM

## 2025-02-21 DIAGNOSIS — H53.9 VISION CHANGES: ICD-10-CM

## 2025-02-21 RX ORDER — DEXAMETHASONE 4 MG/1
4 TABLET ORAL
Qty: 8 TABLET | Refills: 0 | Status: SHIPPED | OUTPATIENT
Start: 2025-02-21 | End: 2025-02-25

## 2025-02-21 NOTE — PROGRESS NOTES
Ordering MRI brain for patient with new onset numbness and tingling, dizziness, vision changes, headaches, speech changes.

## 2025-03-05 SDOH — HEALTH STABILITY: PHYSICAL HEALTH: ON AVERAGE, HOW MANY MINUTES DO YOU ENGAGE IN EXERCISE AT THIS LEVEL?: 0 MIN

## 2025-03-05 SDOH — ECONOMIC STABILITY: FOOD INSECURITY: WITHIN THE PAST 12 MONTHS, THE FOOD YOU BOUGHT JUST DIDN'T LAST AND YOU DIDN'T HAVE MONEY TO GET MORE.: NEVER TRUE

## 2025-03-05 SDOH — HEALTH STABILITY: PHYSICAL HEALTH: ON AVERAGE, HOW MANY DAYS PER WEEK DO YOU ENGAGE IN MODERATE TO STRENUOUS EXERCISE (LIKE A BRISK WALK)?: 0 DAYS

## 2025-03-05 SDOH — ECONOMIC STABILITY: INCOME INSECURITY: HOW HARD IS IT FOR YOU TO PAY FOR THE VERY BASICS LIKE FOOD, HOUSING, MEDICAL CARE, AND HEATING?: SOMEWHAT HARD

## 2025-03-05 SDOH — ECONOMIC STABILITY: HOUSING INSECURITY: IN THE PAST 12 MONTHS, HOW MANY TIMES HAVE YOU MOVED WHERE YOU WERE LIVING?: 0

## 2025-03-05 SDOH — ECONOMIC STABILITY: INCOME INSECURITY: IN THE LAST 12 MONTHS, WAS THERE A TIME WHEN YOU WERE NOT ABLE TO PAY THE MORTGAGE OR RENT ON TIME?: YES

## 2025-03-05 SDOH — HEALTH STABILITY: PHYSICAL HEALTH
HOW OFTEN DO YOU NEED TO HAVE SOMEONE HELP YOU WHEN YOU READ INSTRUCTIONS, PAMPHLETS, OR OTHER WRITTEN MATERIAL FROM YOUR DOCTOR OR PHARMACY?: RARELY

## 2025-03-05 SDOH — ECONOMIC STABILITY: FOOD INSECURITY: WITHIN THE PAST 12 MONTHS, YOU WORRIED THAT YOUR FOOD WOULD RUN OUT BEFORE YOU GOT MONEY TO BUY MORE.: NEVER TRUE

## 2025-03-05 SDOH — ECONOMIC STABILITY: HOUSING INSECURITY: AT ANY TIME IN THE PAST 12 MONTHS, WERE YOU HOMELESS OR LIVING IN A SHELTER (INCLUDING NOW)?: NO

## 2025-03-05 SDOH — ECONOMIC STABILITY: TRANSPORTATION INSECURITY
IN THE PAST 12 MONTHS, HAS THE LACK OF TRANSPORTATION KEPT YOU FROM MEDICAL APPOINTMENTS OR FROM GETTING MEDICATIONS?: YES

## 2025-03-05 ASSESSMENT — SLEEP AND FATIGUE QUESTIONNAIRES
FATIGUE_CAUSES_FREQUENT_PROBLEMTS: 7 STRONGLY AGREE
FATIGUE_MOST_DISABILING_SYMPTOM: 7 STRONGLY AGREE
EXERCISE_BRINGS_ON_FATIGUE: 7 STRONGLY AGREE
MY MOTIVATION IS LOWER WHEN I AM FATIGUED.: 7 STRONGLY AGREE
FATIGUE_INTERFERES_SOCIAL_LIFE: 7 STRONGLY AGREE
FATIGUE_MOST_DISABILING_SYMPTOM: 7 STRONGLY AGREE
AVERAGE_FSS_SCORE: 7
FATIGUE_INTERFERES_PHYSICAL_FUNCTIONING: 7 STRONGLY AGREE
FATIGUE_INTERFERES_SOCIAL_LIFE: 7 STRONGLY AGREE
EASILY_FATIGUED: 7 STRONGLY AGREE
FATIGUE_INTERFERES_RESPONSIBILITIES: 7 STRONGLY AGREE
EXERCISE_BRINGS_ON_FATIGUE: 7 STRONGLY AGREE
MY MOTIVATION IS LOWER WHEN I AM FATIGUED.: 7 STRONGLY AGREE
FATIGUE_INTERFERES_RESPONSIBILITIES: 7 STRONGLY AGREE
MY FATIGUE PREVENTS SUSTAINED PHYSICAL FUNCTIONING.: 7 STRONGLY AGREE
VISUAL ANALOGUE FATIGUE SCALE (VAFS): 9
FATIGUE_CAUSES_FREQUENT_PROBLEMTS: 7 STRONGLY AGREE
FATIGUE_INTERFERES_PHYSICAL_FUNCTIONING: 7 STRONGLY AGREE
MY FATIGUE PREVENTS SUSTAINED PHYSICAL FUNCTIONING.: 7 STRONGLY AGREE
EASILY_FATIGUED: 7 STRONGLY AGREE

## 2025-03-05 ASSESSMENT — SOCIAL DETERMINANTS OF HEALTH (SDOH)
HOW OFTEN DO YOU ATTEND CHURCH OR RELIGIOUS SERVICES?: NEVER
IN THE PAST 12 MONTHS, HAS THE ELECTRIC, GAS, OIL, OR WATER COMPANY THREATENED TO SHUT OFF SERVICE IN YOUR HOME?: NO
IN A TYPICAL WEEK, HOW MANY TIMES DO YOU TALK ON THE PHONE WITH FAMILY, FRIENDS, OR NEIGHBORS?: ONCE A WEEK
IN THE PAST 12 MONTHS, HAS THE ELECTRIC, GAS, OIL, OR WATER COMPANY THREATENED TO SHUT OFF SERVICE IN YOUR HOME?: NO
DO YOU BELONG TO ANY CLUBS OR ORGANIZATIONS SUCH AS CHURCH GROUPS UNIONS, FRATERNAL OR ATHLETIC GROUPS, OR SCHOOL GROUPS?: NO
HOW OFTEN DO YOU ATTENT MEETINGS OF THE CLUB OR ORGANIZATION YOU BELONG TO?: NEVER
HOW OFTEN DO YOU ATTENT MEETINGS OF THE CLUB OR ORGANIZATION YOU BELONG TO?: NEVER
HOW OFTEN DO YOU GET TOGETHER WITH FRIENDS OR RELATIVES?: NEVER
DO YOU BELONG TO ANY CLUBS OR ORGANIZATIONS SUCH AS CHURCH GROUPS UNIONS, FRATERNAL OR ATHLETIC GROUPS, OR SCHOOL GROUPS?: NO
HOW OFTEN DO YOU GET TOGETHER WITH FRIENDS OR RELATIVES?: NEVER
HOW OFTEN DO YOU ATTEND CHURCH OR RELIGIOUS SERVICES?: NEVER
IN A TYPICAL WEEK, HOW MANY TIMES DO YOU TALK ON THE PHONE WITH FAMILY, FRIENDS, OR NEIGHBORS?: ONCE A WEEK

## 2025-03-05 ASSESSMENT — ANXIETY QUESTIONNAIRES
1. FEELING NERVOUS, ANXIOUS, OR ON EDGE: SEVERAL DAYS
6. BECOMING EASILY ANNOYED OR IRRITABLE: MORE THAN HALF THE DAYS
4. TROUBLE RELAXING: SEVERAL DAYS
3. WORRYING TOO MUCH ABOUT DIFFERENT THINGS: MORE THAN HALF THE DAYS
1. FEELING NERVOUS, ANXIOUS, OR ON EDGE: SEVERAL DAYS
7. FEELING AFRAID AS IF SOMETHING AWFUL MIGHT HAPPEN: NOT AT ALL
5. BEING SO RESTLESS THAT IT IS HARD TO SIT STILL: MORE THAN HALF THE DAYS
3. WORRYING TOO MUCH ABOUT DIFFERENT THINGS: MORE THAN HALF THE DAYS
7. FEELING AFRAID AS IF SOMETHING AWFUL MIGHT HAPPEN: NOT AT ALL
5. BEING SO RESTLESS THAT IT IS HARD TO SIT STILL: MORE THAN HALF THE DAYS
IF YOU CHECKED OFF ANY PROBLEMS ON THIS QUESTIONNAIRE, HOW DIFFICULT HAVE THESE PROBLEMS MADE IT FOR YOU TO DO YOUR WORK, TAKE CARE OF THINGS AT HOME, OR GET ALONG WITH OTHER PEOPLE: SOMEWHAT DIFFICULT
IF YOU CHECKED OFF ANY PROBLEMS ON THIS QUESTIONNAIRE, HOW DIFFICULT HAVE THESE PROBLEMS MADE IT FOR YOU TO DO YOUR WORK, TAKE CARE OF THINGS AT HOME, OR GET ALONG WITH OTHER PEOPLE: SOMEWHAT DIFFICULT
6. BECOMING EASILY ANNOYED OR IRRITABLE: MORE THAN HALF THE DAYS
4. TROUBLE RELAXING: SEVERAL DAYS
2. NOT BEING ABLE TO STOP OR CONTROL WORRYING: SEVERAL DAYS
2. NOT BEING ABLE TO STOP OR CONTROL WORRYING: SEVERAL DAYS
GAD7 TOTAL SCORE: 9

## 2025-03-05 ASSESSMENT — LIFESTYLE VARIABLES
HOW OFTEN DO YOU HAVE A DRINK CONTAINING ALCOHOL: NEVER
DO_YOU_DRINK?: I DO THIS LESS OFTEN
HOW OFTEN DO YOU HAVE SIX OR MORE DRINKS ON ONE OCCASION: NEVER
HOW OFTEN DO YOU HAVE 6 OR MORE DRINKS ON ONE OCCASION: NEVER
SKIP TO QUESTIONS 9-10: 1
HOW MANY STANDARD DRINKS CONTAINING ALCOHOL DO YOU HAVE ON A TYPICAL DAY: PATIENT DOES NOT DRINK
HOW OFTEN DO YOU HAVE A DRINK CONTAINING ALCOHOL: NEVER
USE_ALCOHOL_TO_HELP_SLEEP: NO
HOW MANY STANDARD DRINKS CONTAINING ALCOHOL DO YOU HAVE ON A TYPICAL DAY: PATIENT DOES NOT DRINK
AUDIT-C TOTAL SCORE: 0

## 2025-03-12 ENCOUNTER — TELEPHONE (OUTPATIENT)
Dept: OTHER | Facility: CLINIC | Age: 40
End: 2025-03-12
Payer: COMMERCIAL

## 2025-03-12 ENCOUNTER — TELEPHONE (OUTPATIENT)
Dept: CARDIOLOGY | Facility: HOSPITAL | Age: 40
End: 2025-03-12
Payer: COMMERCIAL

## 2025-03-12 ASSESSMENT — MONTREAL COGNITIVE ASSESSMENT (MOCA)
12. MEMORY INDEX SCORE: 2
13. ORIENTATION SUBSCORE: 6
7. [VIGILENCE] TAP WHEN HEARING DESIGNATED LETTER: 1
WHAT LEVEL OF EDUCATION WAS ATTAINED: 0
10. [FLUENCY] NAME WORDS STARTING WITH DESIGNATED LETTER: 1
6. READ LIST OF DIGITS [FORWARD/BACKWARD]: 2
5. MEMORY TRIALS: 0
WHAT IS THE TOTAL SCORE (OUT OF 30): 19
8. SERIAL SUBTRACTION OF 7S: 3
4. NAME EACH OF THE THREE ANIMALS SHOWN: 0
11. FOR EACH PAIR OF WORDS, WHAT CATEGORY DO THEY BELONG TO (OUT OF 2): 2
9. REPEAT EACH SENTENCE: 2
VISUOSPATIAL/EXECUTIVE SUBSCORE: 0

## 2025-03-13 NOTE — TELEPHONE ENCOUNTER
ALISSA Alcantara with Dr. Morris's office to set up P2P appointment in regard to Rachana Morton's disability. Provided [facility] office number [phone] for call back.

## 2025-03-14 NOTE — ASSESSMENT & PLAN NOTE
03/2025:  GI complaints, dizziness, palpitations and HR irregularities, pain, anxiety and depression, fatigue, brain fog, tinnitus, smell and taste changes, shortness of breath, chest pain, rashes/skin flushing, numbness and tingling, muscle cramps, tremors, muscle weakness  -Vitamin D3 supplement prescribed, continue your current Vitamin B12 supplement  -additional labs ordered for evaluation of MCAS concern: serum tryptase, serum histamine level  -bloodwork did not reveal any autoimmune process, you can hold off on scheduling with Rheumatology  -I will reach out to Dr. Pino and let him know that you are having difficulty scheduling a follow up visit with him  -call Dr. Davis's office to schedule a follow up visit  -to help with suspected MCAS symptoms, you can switch from Zyrtec to Allegra, 180mg once or twice daily. You can also try adding OTC famotidine at 20mg twice daily. Low histamine diet instructions will be emailed to you.  -please schedule a follow up visit with GI   -referral to virtual Physical Therapist Stephanie Grace, please call [phone] to set this up  -continue to utilize strategies explored in OT for fatigue and brain fog management  -handicap placard order placed    12/2024:  GI complaints, dizziness, palpitations and HR irregularities, pain, anxiety and depression, fatigue, brain fog, tinnitus, smell and taste changes, shortness of breath, chest pain, rashes/skin flushing, numbness and tingling, muscle cramps, tremors, muscle weakness  -repeat MMA and Vitamin B12 per Dr. Davis's recommendations, other labs added as well  -we will plan on resuming TMJ PT and aquatherapy after GI issues settle down  -referral to [facility] Psychology Support Group  -I would like to refer you to Dr. Dwayne Burrell (Dr. ESTEBAN) with Allergy/Immunology in [place] for further evaluation of hives/itching and frequent hrv0lddtie. Please call [phone] to set up an appointment.   -referral to Dr. Morocho  with Rheumatology for concern of underlying autoimmune process  -make a FUV with Dr. 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 Dr. Davis recommends the following next steps:  -repeat QSART, if negative then will proceed with TST  -lip biopsy to rule in/out Sjogren's  -additional bloodwork ordered by Dr. Davis, also repeat Vitamin B12 level   -continue mitochondrial cocktail  -pn/myopathy EMG  -skin biopsy to rule in/out small fiber neuropathy  -continue to focus on conservative measures    08/2024:  GI complaints, dizziness, palpitations and HR irregularities, pain, anxiety and depression, fatigue, brain fog, tinnitus, smell and taste changes, shortness of breath, chest pain, rashes/skin flushing, numbness and tingling, muscle cramps, tremors, muscle weakness, weight loss goal  -reach out to your GI provider regarding constipation, nausea, and blood in stool. Stop Zofran as this is worsening constipation  -increase Lexapro to 20mg daily for anxiety and depression, follow up with therapy and psychiatry as planned  -resources for ADHD testing provided  -continue conservative measures for POTS management, Vitamin B12 supplement, mitochondrial cocktail. Follow up with Dr. Davis as scheduled later this week.    04/2024:   sinus complaints, GI complaints, dizziness, palpitations and HR irregularities, pain, anxiety and depression, fatigue, brain fog, headache, tinnitus, smell and taste changes, shortness of breath, cough, chest pain, rashes/skin flushing, numbness and tingling, muscle cramps, tremors, muscle weakness, weight loss goal  -repeat Vitamin D and B12 level in 2-3 weeks  -smell retraining therapy   -you can  "send paperwork for intermittent FMLA to [email or web address] or Fax Number [phone]   -try [employer] to help further with LPR  -reach out to GI about stool changes  -discuss ADHD treatment option with your PCP  -consultation with Dr. Davis in autonomic clinic will be scheduled on June 13th  -start mitchondrial cocktail, this will be emailed to you as well  -continue propranolol at 10mg three times daily and use conservative measures to help with POTS  -continue Lexapro at current dose and follow-up with Psychology as scheduled  -proceed with CPET as recommended by cardiology  -follow-up with genetics for concern for EDS  -follow-up with Neurology as scheduled for headaches and tremors  -we will consider OT and PT for physical and cognitive [facility] after work-up is completed  -additional recommendations provided under \"Patient Education\" section   -> increase B12 supplement to 5000mcg daily    02/2024: sinus complaints, GI complaints, dizziness, palpitations and HR irregularities, pain, anxiety and depression, fatigue, brain fog, headache, tinnitus, smell and taste changes, shortness of breath, cough, chest pain, rashes/skin flushing, numbness and tingling, muscle cramps, tremors, muscle weakness  -comprehensive blood work, please have this drawn in the morning, fasting except for water   -tilt table test to rule out dysautonomia as a cause of your dizziness   -Home Sleep Study to rule out sleep apnea as a cause/contribution to your symptoms   -diaphragmatic breathing exercises  -referral to Cardiologist Dr. Fabian Kam  -restart propranolol at 10mg twice daily, we will consider increasing this dose depending on how you feel   -for anxiety and depression, I prescribed Lexapro 10mg daily and referred you to Long COVID Psychologist Nu Fitzpatrick  -for sinus complaints, I referred you to rhinology   -I will reach out to the  neurologist with whom you are scheduled " next month to ask if they treat dysautonomia  -> D supplement, B12 supplement

## 2025-03-14 NOTE — PROGRESS NOTES
INGE Virtual The virtual visit conducted with Audio and Video.    Verbal consent was given for the following virtual visit, patient is currently located in [place]. All issues discussed and addressed below were done so without a physical examination. If it was felt the patient needed be seen in clinic in person they were directed there.     Subjective   COVID-19 Infection Date:  12/14/2020 confirmed via rapid home antigen test (sx: Fever, Fatigue, Loss or disturbed smell, Headache, Sore throat, Cough, Pain on breathing, Loss or disturbed taste, Muscle pain, Sinus pressure, Shortness of breath, Chest pain, Runny nose, Abdominal pain  - no hospitalization, treated with Augmentin and prednisone)  December 2021 confimed via rapid home antigen test (sx: Fever, Cough, Runny nose, Sore throat, Sinus Pressure - no hospitalization, treated with Augmentin and Prednisone for sinusitis)  Spring 2022 confirmed via rapid home antigen test (sx: Fever, Cough, Runny nose, Sore throat, Sinus Pressure  - no hospitalization, treated with Augmentin and prednisone for sinusitis)  9/20/2023 confirmed via rapid home antigen test (sx: Fever, Cough, Shortness of breath, Fatigue, Pain on breathing, Chest pain, Loss or disturbed smell, Loss or disturbed taste, Runny nose, Headache, Muscle pain, Abdominal pain, Diarrhea, Brain fog, Sore throat, Sinus pressure  - no hospitalization, treated with Prednisone for sinusitis)    COVID-19 vaccine status: Pf[employer] 11/20/21, 12/11/21, 5/17/22     Occupation: full-time  for [employer] working remotely    Current Providers: PCP Faustina Soria, Cardiology Dr. Aleman, GI MICHAEL Dyson, ENT MICHAEL Salgado and Dr. Richard, Genetics Dr. Pino, [facility]    Survey Scores: 01/2024 -> 07/2024 -> 03/2025  PHQ-9: 16 -> 15 -> 19   CHAITANYA-7: 6  -> 6 -> 9  Sleep Wellness: 9  -> 9 -> 8  FSS average: 6.56  -> 6.89 -> 7  Modified Ecog average: 2.17  -> 2.83 -> 3.42  MOCA: 21/22  (02/2024) -> 19/22 (03/2025)  Overall Health: 50 -> 45 -> 50 -> 20    39 y.o. female with a h/o COVID-19 in December 2020, December 2021, Spring 2022, September 2023, hypothyroidism, endometriosis, fibromyalgia, POTS, HTN, migraines, obesity, IBS, presents for follow up in the  [facility] c/o GI complaints, dizziness, palpitations and HR irregularities, pain, anxiety and depression, fatigue, brain fog, tinnitus, smell and taste changes, shortness of breath, chest pain, rashes/skin flushing, numbness and tingling, muscle cramps, tremors, muscle weakness    Feels that GI problems have been due to IBS/IBD and MCAS, has been keeping track of what she is eating and effects on GI system  Hoping that Cdiff is gone, has not noticed specific odor  Has not talked to GI since she has had an antibiotic, waiting to see what happens at immunology appointment  Still episodes of abdominal pain and diarrhea, usually in the middle of eating something, inconsistent when it comes to triggering food, diarrhea comes in waves, will go from watery stool to rock-hard constipation a few days later  CTE ordered by GI was scheduled in January, facility rescheduled, then she had to reschedule due to severe diarrhea  Insurance coverage problem for CTE, Gi noted that she may not need it  Has lost a total of 53lbs since November, hair is falling out  Muscle tone is down because she is inactive, not tolerating activity well  Seeing Dr. Braxton in May for MCAS concern  Brain MRI scheduled next week  New dizziness is very different, was severe 6 weeks ago, often triggered by overexertion  Bright lights and dizziness causes blurry vision  Was diagnosed with migraines as a teenager, those have lessened, has not had significant headaches  Intermittent dizziness, last week had two days where dizziness was consistent for 2 days  If moving eyes or head from side to side it makes her dizzy, when moving eyes also will have louder ringing in  her ears  BP is really high sometimes when dizzy, sometimes low  Was not taking middle of the day propranolol for POTS for a few weeks because HR dropped into 30s  Last few days HR will go up to 170 when standing up, then drops again  Feeling better this week compared to last week  Chronic pain is ongoing, everything hurts non-stop, way more random shots of pinprick pain, 2-3 times per day  Often that pain comes with muscle spasms, foot will reflex for example  Also having muscle cramps  Brain fog has been more prevalent, forgetting words and what she is doing often  Fatigue has been worse, sleeping until 10/11/12 often which is not normal for her, still tired  Very best friend was admitted to psychiatric sneed this weekend for suspected SI  Relationship with her mom has been good but through that is learning bad things about her father  Barely can take care of herself, has lost her ability to work and cannot do anything enjoyable  Seeing therapist again next week, didn't have them for a while due to financial constraints  Was not having much problems with SOB unless exerting herself, but then the past few weeks her night-time oxymetry monitor showed drops below 90%  Taste has been very strange, tested herself for COVID twice just because of that and it was negative  Out of nowhere things she likes take very bad or really off  Does not feel as though her smell has changed, any issues with scent are more MCAS related, has always be sensitive to over powerful smells, getting tingling sensation and coughing and SOB when around a smell  Having many skin reactions, red all over, often when she eats, also tingling in her lips and in her mouth when she is eating  Ongoing hives, has had those her whole life  Had chest pain terribly last night, feels that stress is contributing, financial stressors are contributing  Tremors have been worse, relates this to increased stressors as well  Feels better when she is primarily  resting, more symptomatic when she is does something around the house  Has not yet explored low histamine diet, waiting to discuss with allergy/immunology  Never saw the PT provider for TMJ due to illness, was referred to aquatherapy but then was diagnosed with Cdiff  Providers are too far away, unable to see them in person due to severity of symptoms  Cannot make a follow up visit with Dr. Pino, central scheduling makes her schedule a new patient visit and those are scheduled out very far  Has only been able to leave the house three times since last visit due to symptoms    Relevant prior healthcare visits:  -12/2024 OT notes improvement in use of energy conservation techniques, would benefit from further instruction to maximize fatigue management, receptive to instruction on use of internal memory strategies, performed better with linking and chunking than with visualization, would benefit from further instruction on memory strategies  -12/2024 GI notes GI symptoms are improving, CT showed moderate nonspecific colitis and colonoscopy was normal other than hemorrhoids, start Nifedipine for anal fissure, complete CTE to evaluate for small bowel, recheck fecal calprotectin and follow up with CORs if bleeding/pain continues, consider SIBO breath test at follow up, continue PPI  -10/2024 PT eval for jaw and neck pain  -09/2024 ENT for minor salivary gland biopsy for suspected Sjögren's   -08/2024 ENT for tinnitus and TMJ dysfunction, recommend PT and lifestyle strategies  -05/2024 ENT stopped omeprazole for LPR, recommends follow up with Dr. 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 Plan to review hearing test and mother's imaging studies prior to proceeding with genetic testing (EDS genes +/- Stickler  syndrome genes +/- aortopathy genes). Referred to PM&R  -05/2024 ENT Dr. Richard for left chronic sinusitis and odontogenic sinusitis, recommends dental evaluation  -03/2024 Cardiology Dr. Kam ordered CPET for PASC-related exercise intolerance, chest pain, tachycardia, DELGADO  -02/2024 PCP prescribed Flonase and medrol dose pack for sinus complaint  -12/2023 ED for syncope in shower and foot injury  -10/2023 GI ordered fibroscan for fatty liver, fiber supplement and miralax for IBS-C, omeprazole and gallbladder US for RUQ abdominal pain, referred to cardiology for HTN and tachycardia  -11/2022 Sleep Medicine [facility] EDS, recommends follow-up with rheumatology as scheduled, PSG and MSLT    Relevant prior diagnostic studies:  -11/2024 EGD/colonoscopy shows GERD, mild chronic nonspecific gastritis, neg H. Pylori  -09/2024 EMG unremarkable except for presence of mild chronic neurogenic changes in 2 muscles innervated by the C8 segment/root, not sufficient for a definite diagnosis of a cervical radiculopathy at this level  -09/2024 QSART not consistent with a definite postganglionic sympathetic sudomotor abnormality like that seen in autonomic/small fiber neuropathy, isolated decreased sweat output at the distal leg is of unclear significance  -09/2024 CT a/p shows moderate nonspecific colitis  -05/2024 CT sinus shows complete opacification of the left ostiomeatal unit and resultant severe mucoasal disease of the left maxillary sinus and left anterior ethmoid air cells  -04/2024 HSAT shows very mild ED with O2 garret 83.1  -02/2024 autonomic testing consistent with POTS  -02/2024 CTA chest shows no PE and clear lungs  -12/2023 CXR with clear lungs  -11/2023 PFTs normal  -11/2023 FibroSCAN shows stage 0-1 disease and severe steatosis  -11/2023 echocardiogram with LVEF 50%, borderline pulmonary HTN with RVSP 33-35 mmHg, endocardial walls not well-visualized leading to inability to accurately calculation of EF with possible  mild hypokinesis in the anterior lateral wall but this is very poorly visualized  -10/2023 US gallbladder unremarkable, prominent liver  -10/2023 CT abdomen and pelvis unremarkable   -03/2023 colonoscopy normal    Relevant prior laboratory values, unremarkable unless noted:  -03/2025 TSH, T3, T4, MMA, Vitamin D 16, Vitamin B12, CBC, BMP, Mag  -10/2024 stool cultures, calprotectin elevated, TTG, CRP 1.23  -09/2024 CMP, CBC/D with WBC 16, Mag, Lipase, Vitamin B12 350, Early Sjögren's Ab, [facility] Ab panel  -05/2024 Vitamin B12 250, Vitamin D 38  -02/2024 GGT, cryoglobulin, urine porphobilinogen, urine metanephrines, urine organic acids with elevation in lactic acid, urine copper, UPEP, aldolase, catecholamines, carnitine, TPO ab, Dys-2, Vitamin E, Anti-thyroglobulin ab, T3, T4, anti-parietal cell ab, paraneoplastic ab, mycoplasma pn IgM negative and IgG positive, MMA, homocysteine, serum copper, CoQ10, Mag, Vitamin D 13, Vitamin B12 302, CMP, CK, BNP, CCP, iron studies, TSH, ESR 34, RF, D-Dimer 840, CBC/D, HbA1c, Folate, Ferritin, CRP 1.28, JEET, am cortisol, Vitamin B6, Vitamin B1, Troponin, SPEP, Tryptase  -12/2023 Troponin, CMP, CBC/D  -10/2023 iron studies, Hepatitis panel, Anti-smooth muscle ab, anti-mitochondrial ab, [id number], Mag, Lipase, D-Dimer  705, Vitamin D, TSH  -10/2022 ferritin, T4, vitamin B12 377, JEET, CCP ab, sjogren ab, TPO ab, thyroglubin ab  -05/2022 HbA1c 4.8%, RF, CRP 1.3, ESR, uric acid    Exercise routine: none  Diet:  Weight hx: pre-COVID-19 230  lbs -> post-COVID-19 255  lbs -> 250 lbs -> 245 lbs -> (Patient-Rptd) 209   Substance use: former tobacco use, 1-2 servings ETOH monthly or less   Social:       Current Outpatient Medications:     azelastine (Astelin) 137 mcg (0.1 %) nasal spray, Administer 1 spray into each nostril 2 times a day. Use in each nostril as directed, Disp: , Rfl:     cetirizine (ZyrTEC) 10 mg chewable tablet, Chew once daily., Disp: , Rfl:      cholecalciferol (Vitamin D3) 1,250 mcg (50,000 unit) tablet, Take 1 tablet (50,000 Units) by mouth every 7 days., Disp: 12 tablet, Rfl: 0    cyclobenzaprine (Flexeril) 10 mg tablet, Take 1 tablet (10 mg) by mouth 3 times a day as needed for muscle spasms., Disp: , Rfl:     docusate sodium (Colace) 100 mg capsule, Take 1 capsule (100 mg) by mouth 2 times a day., Disp: 60 capsule, Rfl: 0    DULoxetine (Cymbalta) 20 mg DR capsule, Take 1 capsule (20 mg) by mouth 2 times a day., Disp: , Rfl:     hyoscyamine (Anaspaz, Levsin) 0.125 mg tablet, Take 1 tablet (0.125 mg) by mouth every 6 hours if needed for cramping or diarrhea., Disp: 100 tablet, Rfl: 11    ibuprofen (IBU) 800 mg tablet, Take 1 tablet (800 mg) by mouth every 6 hours if needed for mild pain (1 - 3) or moderate pain (4 - 6)., Disp: 30 tablet, Rfl: 0    ibuprofen 800 mg tablet, Take 1 tablet (800 mg) by mouth every 6 hours if needed for headaches, mild pain (1 - 3) or moderate pain (4 - 6)., Disp: , Rfl:     levothyroxine (Synthroid, Levoxyl) 50 mcg tablet, Take 1 tablet (50 mcg) by mouth once daily., Disp: 30 tablet, Rfl: 1    lisinopril 20 mg tablet, Take 1 tablet (20 mg) by mouth once daily., Disp: 90 tablet, Rfl: 0    methocarbamol (Robaxin) 750 mg tablet, Take 1 tablet (750 mg) by mouth 3 times a day as needed for muscle spasms., Disp: 30 tablet, Rfl: 0    methylphenidate ER (CONCERTA) 27 mg oral extended release tablet, Take 1 tablet (27 mg) by mouth once daily in the morning., Disp: , Rfl:     NON FORMULARY, Place 1 each under the tongue once daily. B12 5000 MCG, Disp: , Rfl:     omeprazole (PriLOSEC) 20 mg DR capsule, Take 1 capsule (20 mg) by mouth once daily in the morning. Take before meals. Do not crush or chew., Disp: 30 capsule, Rfl: 3    propranolol (Inderal) 10 mg tablet, TAKE 1 TABLET BY MOUTH TWICE A DAY, Disp: 180 tablet, Rfl: 0    wheat dextrin (Benefiber Healthy Shape) 5 gram/7.4 gram powder, Take 1 teaspoon daily, Disp: 248 g, Rfl: 1     dexAMETHasone (Decadron) 4 mg tablet, Take 1 tablet (4 mg) by mouth 2 times daily (morning and late afternoon) for 4 days., Disp: 8 tablet, Rfl: 0    fluticasone (Flonase) 50 mcg/actuation nasal spray, Administer 1 spray into each nostril once daily. Shake gently. Before first use, prime pump. After use, clean tip and replace cap., Disp: 16 g, Rfl: 5    Past Medical History:   Diagnosis Date    ADHD     Allergic     Avascular necrosis of bone (Multi) 2019    both hips, from steroids    Disease of thyroid gland     Fibromyalgia     Hypertension     POTS (postural orthostatic tachycardia syndrome)        Past Surgical History:   Procedure Laterality Date    HYSTERECTOMY      1 ovary left    LABYRINTHECTOMY      OTHER SURGICAL HISTORY  01/07/2020    Laparoscopic hysterectomy    RENAL BIOPSY         Family History   Problem Relation Name Age of Onset    No Known Problems Mother         Objective   LMP 11/01/2019     Physical Exam    Assessment/Plan   Problem List Items Addressed This Visit             ICD-10-CM       High    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 Pino and let him know that you are having difficulty scheduling a follow up visit with him  -call Dr. Davis's office to schedule a follow up visit  -to help with suspected MCAS symptoms, you can switch from Zyrtec to Allegra, 180mg once or twice daily. You can also  try adding OTC famotidine at 20mg twice daily. Low histamine diet instructions will be emailed to you.  -please schedule a follow up visit with GI   -referral to virtual Physical Therapist Stephanie Grace, please call [phone] to set this up  -continue to utilize strategies explored in OT for fatigue and brain fog management  -handicap placard order placed    12/2024:  GI complaints, dizziness, palpitations and HR irregularities, pain, anxiety and depression, fatigue, brain fog, tinnitus, smell and taste changes, shortness of breath, chest pain, rashes/skin flushing, numbness and tingling, muscle cramps, tremors, muscle weakness  -repeat MMA and Vitamin B12 per Dr. Davis's recommendations, other labs added as well  -we will plan on resuming TMJ PT and aquatherapy after GI issues settle down  -referral to [facility]  -I would like to refer you to Dr. Dwayne Burrell (Dr. ESTEBAN) with Allergy/Immunology in [place] for further evaluation of hives/itching and frequent [id number]. Please call [phone] to set up an appointment.   -referral to Dr. Morocho with Rheumatology for concern of underlying autoimmune process  -make a FUV with Dr. 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 Dr. Davis recommends the following next steps:  -repeat QSART, if negative then will proceed with TST  -lip biopsy to rule in/out Sjogren's  -additional bloodwork ordered by Dr. Davis, also repeat Vitamin B12 level   -continue mitochondrial cocktail  -pn/myopathy EMG  -skin biopsy to rule in/out small fiber neuropathy  -continue to focus on conservative measures    08/2024:  GI complaints, dizziness, palpitations  and HR irregularities, pain, anxiety and depression, fatigue, brain fog, tinnitus, smell and taste changes, shortness of breath, chest pain, rashes/skin flushing, numbness and tingling, muscle cramps, tremors, muscle weakness, weight loss goal  -reach out to your GI provider regarding constipation, nausea, and blood in stool. Stop Zofran as this is worsening constipation  -increase Lexapro to 20mg daily for anxiety and depression, follow up with therapy and psychiatry as planned  -resources for ADHD testing provided  -continue conservative measures for POTS management, Vitamin B12 supplement, mitochondrial cocktail. Follow up with Dr. Davis as scheduled later this week.    04/2024:   sinus complaints, GI complaints, dizziness, palpitations and HR irregularities, pain, anxiety and depression, fatigue, brain fog, headache, tinnitus, smell and taste changes, shortness of breath, cough, chest pain, rashes/skin flushing, numbness and tingling, muscle cramps, tremors, muscle weakness, weight loss goal  -repeat Vitamin D and B12 level in 2-3 weeks  -smell retraining therapy   -you can send paperwork for intermittent FMLA to [email or web address] or Fax Number [phone]   -try [employer] to help further with LPR  -reach out to GI about stool changes  -discuss ADHD treatment option with your PCP  -consultation with Dr. Davis in autonomic clinic will be scheduled on June 13th  -start mitchondrial cocktail, this will be emailed to you as well  -continue propranolol at 10mg three times daily and use conservative measures to help with POTS  -continue Lexapro at current dose and follow-up with Psychology as scheduled  -proceed with CPET as recommended by cardiology  -follow-up with genetics for concern for EDS  -follow-up with Neurology as scheduled for headaches and tremors  -we will consider OT and PT for physical and cognitive Long COVID Rehabilitation after work-up is completed  -additional  "recommendations provided under \"Patient Education\" section   -> increase B12 supplement to 5000mcg daily    02/2024: sinus complaints, GI complaints, dizziness, palpitations and HR irregularities, pain, anxiety and depression, fatigue, brain fog, headache, tinnitus, smell and taste changes, shortness of breath, cough, chest pain, rashes/skin flushing, numbness and tingling, muscle cramps, tremors, muscle weakness  -comprehensive blood work, please have this drawn in the morning, fasting except for water   -tilt table test to rule out dysautonomia as a cause of your dizziness   -Home Sleep Study to rule out sleep apnea as a cause/contribution to your symptoms   -diaphragmatic breathing exercises  -referral to Cardiologist Dr. Fabian Kam  -restart propranolol at 10mg twice daily, we will consider increasing this dose depending on how you feel   -for anxiety and depression, I prescribed Lexapro 10mg daily and referred you to [facility] Psychologist Nu Fitzpatrick  -for sinus complaints, I referred you to rhinology   -I will reach out to the  neurologist with whom you are scheduled next month to ask if they treat dysautonomia  -> D supplement, B12 supplement            Medium    POTS (postural orthostatic tachycardia syndrome) G90.A    Relevant Orders    Referral to Physical Therapy    Disability Placard     Other Visit Diagnoses         Codes    Long COVID     U09.9    Relevant Orders    Tryptase    Histamine, Plasma    Referral to Physical Therapy    Disability Placard          "

## 2025-03-18 DIAGNOSIS — E55.9 VITAMIN D DEFICIENCY: Primary | ICD-10-CM

## 2025-03-18 LAB
T4 FREE SERPL-MCNC: 0.9 NG/DL (ref 0.8–1.8)
T4 SERPL-MCNC: 7.4 MCG/DL (ref 5.1–11.9)
TSH SERPL-ACNC: 2.73 MIU/L

## 2025-03-18 RX ORDER — CHOLECALCIFEROL (VITAMIN D3) 1250 MCG
50000 TABLET ORAL
Qty: 12 TABLET | Refills: 0 | Status: SHIPPED | OUTPATIENT
Start: 2025-03-18

## 2025-03-20 ENCOUNTER — TELEMEDICINE (OUTPATIENT)
Dept: OTHER | Facility: CLINIC | Age: 40
End: 2025-03-20
Payer: COMMERCIAL

## 2025-03-20 DIAGNOSIS — U09.9 LONG COVID: ICD-10-CM

## 2025-03-20 DIAGNOSIS — G90.A POTS (POSTURAL ORTHOSTATIC TACHYCARDIA SYNDROME): ICD-10-CM

## 2025-03-20 DIAGNOSIS — U09.9 POST-ACUTE SEQUELAE OF COVID-19 (PASC): Primary | ICD-10-CM

## 2025-03-20 NOTE — LETTER
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    I disagree with report provided by Dr. Barrios and Dr. Morris in regard to Rachana Morton's LTD claim.  Claim number [id number].    Rachana Morton is not currently fit to work in any capacity due to the ongoing severity of her symptoms and impact on her physical and cognitive functional capacity. My evaluation is based on the following clinical evidence:  PASC is broadly defined as signs, symptoms, and conditions that continue or develop after initial COVID-19 infection. The signs, symptoms, and conditions are present four weeks or more after the initial phase of infection, may be multisystemic, and may present with a relapsing-remitting pattern and progression or worsening over time, with the possibility of severe and life-threatening events even months or years after injection. PASC is not one condition. It represents many potentially overlapping entities, likely with different biological causes and and different sets of risk factors and outcomes. The mostly commonly reported persisting PASC symptoms include: dyspnea or increased respiratory effort, fatigue, post-exertional malaise and/or poor endurance, brain fog or cognitive impairment, cough, chest pain, headache, palpitations and/or tachycardia, arthralgia, myalgia, paresthesia, abdominal pain, diarrhea, insomnia and other sleep difficulties, fever, lightheadedness, impaired daily function and mobility, pain, rash, mood changes, anosmia or dysgeusia, and menstrual cycle irregularities. Most of these symptoms, unfortunately, are subjective in nature and are difficult to objectively document or quantify.  It has been well documented in the scientific literature that a significant number of patients develop small fiber neuropathy and/or POTS as a  result of their COVID-19 injection, the pathophysiological cause of this has not been established. Rachana Morton has undergone autonomic testing battery that confirms the diagnosis of POTS, which is likely driving the majority of her orthostatic intolerance. Autonomic testing completed 2/21/2024 showed POTS with HR increase form baseline 60 to 132 on tilt table testing. Patient experienced vision changes, shortness of breath, dizziness, pre-syncopal episodes. With aggressive conservative measures and propranolol, patient was able to avoid any further syncopal episodes, but continues to be symptomatic when sitting, standing, walking. She requires frequent recumbent positioning to minimize symptoms. Recent acute GI problems including C difficile infection has worsened orthostatic intolerance to the level that she has not been able to attend in-person physical therapy visits. She is scheduled to start virtual neurological physical therapy with PT Stephanie Grace. Work-up for suspected small fiber neuropathy has not yet been completed, patient is awaiting skin biopsy appointment to become available  Initial evaluation by Neuro Occupational Therapist Cecile Dennis, dated 09/04/2024, notes that patient demonstrated deficits with working memory, processing speed, executive functions, reading, and word finding. Patient is noted to have difficulty performing ADL, IADL, work, and leisure.  Most recent Neuro Occupational Therapy re-evaluation by OT Cecile Dennis, dated 12/11/2024, notes improvement in use of energy conservation techniques with need for further fatigue management instructions. Patient was instructed on use of internal memory strategies and would benefit from further instruction on this as well.  Initial Physical Therapy evaluation dated 12/27/2024 by Fan Luciano notes hypermobility in all joints, weakness in bilateral upper and lower extremities (4-4+/5), poor endurance and fatigue, difficulty performing IADLS. PT  provider recommended aquatic therapy in place of neurological physical therapy, this could not be started due to patient's C. Difficile infection.  As noted in my most recent clinic note, dated, Rachana Morton's Fatigue Severity Score (FSS) average has not significantly improved with a score of 6.56 in 01/2024, 6.89 in 07/2024, 7 in 03/2025, indicating severe levels of fatigue. Modified Every Cognition Scale (Ecog) average has worsened with a score of 2.17 in 01/2024, 2.83 in 07/2024, and 3.42 in 03/2025 this is indicating severe cognitive impairment. Notably, Rachana Morton's modified MOCA has also decreased from 21/22 in 02/2024 to 19/22 in 03/2025. This would be considered borderline normal but a significant decrease in Rachana Morton's pre-injury high-functioning, multitasking, attention oriented capacity.  The essence of Rachana Morton's chronic illness is that she is unable to perform any physical or cognitive/mental task consistently and reliably. Symptoms vary form day to day and are depending on her physical as well as cognitive exertion levels. I estimate that Rachana Morton is unable to function for more than 30 to 60 minutes without having to stop and rest/recover for several hours. Orthostatic intolerance is unpredictable and frequently requires recumbent positioning. She is is unable to concentrate adequately for more than several minutes without making potential errors in judgement or memory, unable to accutately follow multi-step instructions/tasks.   Given the objective evidence gathered through expert assessments and validated questionnaires, I have determined that Rachana Morton is currently incapable of consistently performing work-like tasks for any length of time.     Please contact our clinic with any questions or concerns at [phone].    Sincerely,    Roxanne Hummel, CNP

## 2025-03-20 NOTE — PATIENT INSTRUCTIONS
It was my pleasure seeing you in the [facility] today.  We will focus on addressing the following symptoms discussed today: GI complaints, dizziness, palpitations and HR irregularities, pain, anxiety and depression, fatigue, brain fog, tinnitus, smell and taste changes, shortness of breath, chest pain, rashes/skin flushing, numbness and tingling, muscle cramps, tremors, muscle weakness    My recommendations are as follows:  -Vitamin D3 supplement prescribed, continue your current Vitamin B12 supplement  -additional labs ordered for evaluation of MCAS concern: serum tryptase, serum histamine level  -bloodwork did not reveal any autoimmune process, you can hold off on scheduling with Rheumatology  -I will reach out to Dr. Pino and let him know that you are having difficulty scheduling a follow up visit with him  -call Dr. Davis's office to schedule a follow up visit  -to help with suspected MCAS symptoms, you can switch from Zyrtec to Allegra, 180mg once or twice daily. You can also try adding OTC famotidine at 20mg twice daily. Low histamine diet instructions will be emailed to you.  -please schedule a follow up visit with GI   -referral to virtual Physical Therapist Stephanie Grace, please call [phone] to set this up  -continue to utilize strategies explored in OT for fatigue and brain fog management  -handicap placard order placed    conservative measures to help with dysautonomia symptoms:  --drinking 1 gallon of water/day (or a mix of fluids, non sugary and minimally caffeinated)   --Eating 6g of table salt (3 tsp) in addition to salt used in cooking of naturally present in food. Avoid salt tablets and monitor BP closely  --Wearing compression stockings, grade 40-50 mmHg up to the waist every day, to be removed before bedtime  --Sleeping with the head of the bed up 45 degrees, even for short naps. Avoid stacking pillow or wedges under the neck or the back. Bend the mattress in the middle and  raise the head part and stack bricks underneath. Or invest  in an adjustable bed.  --Water jogging (ie. running in the shallow part of the pool, water waist or chest level), 1 hour a day or 90 minutes 3 times a week.     Tips to help improve brain fog and fatigue:  --avoid drinking Alcohol while recovering from Long COVID  --Focus on eating whole foods to help support your gut and immune system. Aim to eat 30 different plants per week (vegetables, fruits, beans, nuts, legumes, seeds, whole grains, herbs, spices). Avoid processed foods and beverages. Eliminate added sugars, artificial sweeteners, processed oils, artificial dyes.  --ensure to practice 30 minutes of exercise 7 days per week to keep BNDGF (brain derived neurotrophic growth factor) elevated as this will help in the regeneration of neurons, you may split exercise time up into 5 minute increments if this is better tolerated.   --slowly increase your activity by no more than 10% per week, rest when you feel tired  --utilize pacing techniques to manage fatigue, schedule rest times throughout the day so you do not run out of energy, more information to be found on this here: [email or web address]-[email or web address]-19_[email or web address]  --use the “attention beam” strategy to help you focus on some things while ignoring others. Imagine a flashlight beam illuminating the task that you are trying to focus on while leaving everything else in the dark.  --minimize external distractions to help with attention. For example, turn off the TV, radio, music, heater, and other electrical equipment, and close the window to screen out traffic noise. Complete important or difficult tasks in a quiet room if possible. Switch off mobile phones, switch off automatic email notifications. Use ear plugs if necessary or noise-cancelling headphones. Reduce visual distractions by clearing off your work-space, sit opposite to a window. Make sure lighting in the workspace is  "adequate.  --minimize internal distractions to help with attention. Thoughts, feelings, and physical sensations such as hunger or pain can be distracting. When you notice your attention beam is directed toward a thought or sensation, gently direct your attention back to the central focal point. You can also practice mindfulness and/or meditation to help reduce internal distractions  --games that can be tried to help improve memory and attention are Brain HQ and N-Back games  --mindfulness and meditation can be learned with the smartphone apps “Unwinding Anxiety” and “Headspace”  --Review the  Health Talk on Managing Fatigue and Thinking Changes after COVID-19 here: https://www.hospitals.org/Health-Talks/articles/2022/05/managing-fatigue-and-thinking-changes-after-covid-19  --You can also find many helpful tips and tricks in this book: \"The Long COVID self-help guide, practical ways to manage symptoms\" by [employer] [facility] from the [facility] [place]  --additional apps, books, and podcasts for patients suffering from Long COVID can be found at [email or web address]/public-health/long-covid/long-covid-apps-books-podcasts/     To help improve sleep:  --try Melatonin children's liquid drops 1-2mg underneath your tongue 30 minutes before you go to bed  --go to bed at the same time each night and get up at the same time each morning, including the weekend  --goal of 7-9 hours of uninterrupted sleep per night  --make sure your bedroom is quiet, dark, relaxing, and at a comfortable temperature  --remove electronic devices, such as TVs, computers, and smart phones, from your bedroom  --avoid caffeine after the morning and large meals before bed  --drink plenty of water throughout the day but avoid drinking fluids two hours before bed  --expose yourself to bright light in the morning  --engage in a calming bed-time routine of warm bath/shower, gratitude journal, guided meditation, etc.  --do not " lay awake in bed for more than 20 minutes, get up and engage in a soothing activity such as reading a boring book until you feel sleepy     General headache recommendations:  -avoid common triggers which include red wine, dark beer, aged cheese, nuts, onions, chocolate, aspartame, processed meats and nitrates, excessive caffeine, caffeine withdrawal, fasting, skipping meals, barometric pressure change, bright light, poor air quality, odors  -avoid overuse of OTC medications such as Ibuprofen, Naproxen, Excedrin, etc. as this can lead to rebound headaches  -maintain a stable and consistent sleep schedule, even on weekends  -stay well-hydrated with non-caffeinated beverages  -avoid skipping r delaying meals  -aim for 30 minutes of movement per day, find something you enjoy so it doesn't feel like a chore  -Mind-Body and Stress Managements tools include acupuncture, chiropractic care, yoga, meditation, psychotherapy     We will send a message in [employer] or call you with the results of your tests.  Further recommendations will follow based on testing results and your symptoms.   Please return to [facility] in 6-8 weeks, call [phone] or send a message through your [employer] luiz if needed.    If you are interested in joining a clinical trial, look into the following resources:  https://clinicaltrials.gov/  https://trials.[email or web address]/  [email or web address]/resources/clinical-[email or web address]

## 2025-03-20 NOTE — Clinical Note
Low histamine diet handout please. Can you please schedule her for a 60min virtual FUV in 6-8 weeks?

## 2025-03-21 LAB
25(OH)D3+25(OH)D2 SERPL-MCNC: 16 NG/ML (ref 30–100)
ANION GAP SERPL CALCULATED.4IONS-SCNC: 11 MMOL/L (CALC) (ref 7–17)
BUN SERPL-MCNC: 7 MG/DL (ref 7–25)
BUN/CREAT SERPL: NORMAL (CALC) (ref 6–22)
CALCIUM SERPL-MCNC: 9.6 MG/DL (ref 8.6–10.2)
CHLORIDE SERPL-SCNC: 100 MMOL/L (ref 98–110)
CO2 SERPL-SCNC: 27 MMOL/L (ref 20–32)
CREAT SERPL-MCNC: 0.58 MG/DL (ref 0.5–0.97)
EGFRCR SERPLBLD CKD-EPI 2021: 119 ML/MIN/1.73M2
ERYTHROCYTE [DISTWIDTH] IN BLOOD BY AUTOMATED COUNT: 12.9 % (ref 11–15)
GLUCOSE SERPL-MCNC: 83 MG/DL (ref 65–99)
HCT VFR BLD AUTO: 40.3 % (ref 35–45)
HGB BLD-MCNC: 13.1 G/DL (ref 11.7–15.5)
MAGNESIUM SERPL-MCNC: 2.1 MG/DL (ref 1.5–2.5)
MCH RBC QN AUTO: 29.4 PG (ref 27–33)
MCHC RBC AUTO-ENTMCNC: 32.5 G/DL (ref 32–36)
MCV RBC AUTO: 90.4 FL (ref 80–100)
METHYLMALONATE SERPL-SCNC: 78 NMOL/L (ref 55–335)
PLATELET # BLD AUTO: 355 THOUSAND/UL (ref 140–400)
PMV BLD REES-ECKER: 11.3 FL (ref 7.5–12.5)
POTASSIUM SERPL-SCNC: 4.2 MMOL/L (ref 3.5–5.3)
PYRIDOXAL PHOS SERPL-MCNC: 20.7 NG/ML (ref 2.1–21.7)
RBC # BLD AUTO: 4.46 MILLION/UL (ref 3.8–5.1)
SODIUM SERPL-SCNC: 138 MMOL/L (ref 135–146)
VIT B12 SERPL-MCNC: 608 PG/ML (ref 200–1100)
WBC # BLD AUTO: 5.3 THOUSAND/UL (ref 3.8–10.8)

## 2025-03-27 ENCOUNTER — TELEPHONE (OUTPATIENT)
Dept: GENETICS | Facility: CLINIC | Age: 40
End: 2025-03-27
Payer: COMMERCIAL

## 2025-04-04 LAB
HISTAMINE SERPL-MCNC: NORMAL NG/ML
TRYPTASE SERPL-MCNC: 3.8 MCG/L

## 2025-04-08 ENCOUNTER — APPOINTMENT (OUTPATIENT)
Dept: GENETICS | Facility: CLINIC | Age: 40
End: 2025-04-08
Payer: COMMERCIAL

## 2025-04-08 DIAGNOSIS — N05.0 MINIMAL CHANGE DISEASE: ICD-10-CM

## 2025-04-08 DIAGNOSIS — M24.80 GENERALIZED HYPERMOBILITY OF JOINTS: Primary | ICD-10-CM

## 2025-04-08 DIAGNOSIS — Z82.49 FAMILY HISTORY OF AORTIC ANEURYSM: ICD-10-CM

## 2025-04-08 LAB
HISTAMINE SERPL-MCNC: 3.6 NG/ML
TRYPTASE SERPL-MCNC: 3.8 MCG/L

## 2025-04-08 PROCEDURE — 99215 OFFICE O/P EST HI 40 MIN: CPT | Performed by: INTERNAL MEDICINE

## 2025-04-09 NOTE — PROGRESS NOTES
MEDICAL GENETICS FOLLOW-UP VISIT NOTE    Patient full name: Rajani Morton  MRN: [id number]  YOB: 1985  Present during this visit: The patient     Primary care provider: Faustina Soria PA-C    Medical history was obtained from the patient. Prior to this appointment, I reviewed medical records from outpatient medical records and scanned documents, if available. This visit was completed via televideo. All issues as below were discussed and addressed but no physical exam was performed. If it was felt that the patient should be evaluated in clinic then they were directed there. The patient consented to visit.    Interval history:  Ms. Morton is a 39 y.o. female who returned to Genetics clinic for complex health issues. I recommended a hearing test last time, normal hearing. She is able to provide me with her mother's information. The mother is confirmed to have thoracic aortic aneurysm and will see  Genetics next week.     Other updates:  -Father does not want to see Genetics for personal history of metastatic prostate cancer.   -Saw ENT for possible Sjogren syndrome. Lip biopsy to be done.   -Had GI bleeding. Found to have anal fissure and colitis on CT. Colonoscopy and EGD were wnl.   -EMG 9/24 - wnl  -I was able to review the report of kidney biopsy in 2002 which showed minimal change disease (see Results).     From my first note 5/29/2024  Ms. Morton is a 38 y.o. female who was referred to Genetics for evaluation of inherited connective tissue disorders. Ms. Morton has chronic longstanding musculoskeletal issues as outlined below. Pertinent negative and positive history related to inherited connective tissue disorders are as followings:     Musculoskeletal  - Hypermobility: Yes  - Joint laxity: Yes  - Joint dislocation: No  - Chronic widespread musculoskeletal pain: Yes  - Tendon/muscle rupture:  No  - Fractures: Yes   x2 ankle and wrist from injury.   - Scoliosis: No  - Pectus differences:  "No  - Flat feet: No  - Has not seen Rheumatology      Skin  - Spontaneous skin separation: ? Not typical for cEDS.  - Easy bruising: Yes  - Skin hyperextensibility: Yes  - Stretch marks not related to weight change: Yes  - Abnormal scar formation, atrophic scar, wound dehiscence: Yes   slow wound healing  - Started having varicose veins at legs in teenage years.       Dental  - Dental crowding: No  - Gingival disease: No     Cardiopulmonary:  - Orthostatic intolerance: Yes   dx with POTS. Seeing Dr. Davis 6/13/2024.  - Pneumothorax: No  - Echocardiogram: Yes, most recent 11/2023    no aortic root dilatation. Borderline pulmonary hypertension is noted.   - Spontaneous rupture/aneurysm/dissection of blood vessels: No     GI/  - IBS-like symptoms: Yes  IBS-C, seeing GI.   - Hernia: No  - Rectal prolapse: No  - Pelvic/uterine prolapse: No reports pelvic floor dysfunction. She cannot hold urine for long. Not seeing a provider for this.   - Spontaneous rupture of internal organs: No  - Obstetric complications (e.g., significant vaginal tear, or postpartum hemorrhage): miscarriages x2  - She is s/p hysterectomy due to endometriosis. Has \"1.5 ovaries\" left. She has PCOS.      Ocular/ENT  - Lens dislocation:  No  - High myopia:  No  - Hearing loss:  ? Reports reduced hearing, not seen by Audiology.      Neuropsychiatric:  - Migraines/headaches:  Yes  - Neuropathy:  Yes  - She has ADHD.      Pediatric history: No congenital hip dislocation, congenital club feet, hypotonia, developmental delay, autism.    Other medical issues include hypothyroidism, HTN. She had nephrotic syndrome around 16-18 years of age, s/p kidney biopsy, getting steroids and \"oral chemo\". 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 The cause of nephrotic syndrome is reportedly idiopathic. It has not recurred. She is not seeing Nephrology.      Social history:  Works in an [employer] industry.      Family history:  Three-generation family " "tree was obtained and scanned to chart, under \"Genetics\" folder.  Pertinent history   Dad, metastatic prostate cancer. He also has brain aneurysm.   Pat aunt (d) metastatic breast ca at 37.  Mom has ?aortic aneurysm. Record is not available.   Mat uncle started using hearing aids late 40.  MGM, melanoma, Afib  MGF, strokes (hemorrhagic), teeth issues, coronary blockage.      Mom, sister, mat aunt, female first cousin have similar musculoskeletal issues including joint hypermobility.       No other family members with ectopia lentis, rupture of internal organs, early deafness/blindness, or spontaneous pneumothorax.     Ashkenazi Taoism: Denied     Physical examination:  Arm span to height ratio: 173:175 ~1  Upper segment to lower segment ratio: 85:90 ~1  GENERAL: The patient is alert, in no apparent distress.  Head shape is normal.  Face: Malar hypoplasia+. Forehead, nose, philthrum, and chin appear normal.  Eyes: Not deep set. Downslanting palpebral fissures+. Sclerae not blue or icteric. PERRLA. No iridodonesis.  Ears: Ear length 5.3cm (less than 2 SD). Not dysplastic, posteriorly rotated, or low set.  Oral cavity: No high arched palate. No dental crowding. Normal uvula, normal dentition, no receding gum line or gingivitis.  CHEST/LUNGS: Pectus deferred.   EXTREMITIES/Back: No arachnodactyly. Wrist signs negative. Thumb signs negative. Bilateral piezogenic papules+. Acrogeria-. No joint contractures. No pretibial plaque. Hindfoot deformity-, flat feet-, scoliosis- by forward bending test. No peripheral edema. Varicose veins+ at legs.   SKIN: Stretch marks deferred. No skin hyperextensibility, bruising+, soft skin consistency+. Veins are visible at arms. Scar not seen.   NEUROLOGIC: EOMI without nystagmus. No ptosis. No facial palsy. Tongue in midline. No dysarthria. Normal gait without abnormal movement.   Psychiatric: Cooperative. Appropriate mood and affect.     Beighton score for generalized joint " hypermobility  1. Passive dorsiflexion and hyperextension of the fifth MCP joint beyond 90°: 2  2. Passive apposition of the thumb to the flexor aspect of the forearm: 2  3. Passive hyperextension of the elbow beyond 10°:  0  4. Passive hyperextension of the knee beyond 10°:  2  5. Active forward flexion of the trunk with the knees fully extended so that the palms of the hands rest flat on the floor:  0  Total  6/9 (where a score of equal to or more than 5 is considered positive for age     Systemic score for Marfan syndrome is  1 (striae) where a score of equal to or more than 7 is considered positive systemic score.      Results:     Echocardiogram 11/2023  PHYSICIAN INTERPRETATION:  Left Ventricle: Left ventricular systolic function is mildly decreased, with an estimated ejection fraction of 50%. There are no regional wall motion abnormalities. The left ventricular cavity size is normal. Spectral Doppler shows a normal pattern of left ventricular diastolic filling. Endocardial walls not well-visualized in all views. Accurate ejection fraction unable to be obtained in light of this. Most views would suggest EF around 50% there may be mild hypokinesis in the anterior lateral wall but this is very poorly visualized. Would consider either echo contrast or cardiac MRI to better define EF and LV function  Diastolic function is normal at this time.  Left Atrium: The left atrium is normal in size.  Right Ventricle: The right ventricle is normal in size. There is normal right ventricular global systolic function. Normal RV size and function  Borderline pulmonary hypertension RVSP 33-35 mmHg.  Right Atrium: The right atrium is normal in size.  Aortic Valve: The aortic valve is trileaflet. There is no evidence of aortic valve regurgitation. The peak instantaneous gradient of the aortic valve is 8.9 mmHg. The mean gradient of the aortic valve is 6.0 mmHg.  Mitral Valve: The mitral valve is normal in structure. There is no  evidence of mitral valve regurgitation.  Tricuspid Valve: The tricuspid valve is structurally normal. There is mild tricuspid regurgitation.  Pulmonic Valve: The pulmonic valve is structurally normal. There is no indication of pulmonic valve regurgitation.  Pericardium: There is no pericardial effusion noted.  Aorta: The aortic root is normal.     CONCLUSIONS:   1. Left ventricular systolic function is mildly decreased with a 50% estimated ejection fraction.   2. Endocardial walls not well-visualized in all views. Accurate ejection fraction unable to be obtained in light of this. Most views would suggest EF around 50% there may be mild hypokinesis in the anterior lateral wall but this is very poorly visualized. Would consider either echo contrast or cardiac MRI to better define EF and LV function      Diastolic function is normal at this time.   3. Normal RV size and function      Borderline pulmonary hypertension RVSP 33-35 mmHg.   4. Normal valve structure and function.     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     Assessment:  Ms. Morton is a 39 y.o. female with phenotypes as outlined in the HPI and PE sections. Family history is notable for thoracic aorticaneurysm in mother, brain aneurysm in father, and maternal uncle using hearing aids in late 40s. Echocardiogram was without aortic root dilatation; it was obtained 11/2023. Her recent hearing test was normal. Recent eye exam was normal around 4 months ago.     I recommend that she provide me with the report of the eye exam to confirm the absence of findings suggestive of a particular inherited connective tissue disorder.     Given connective tissue features and confirmed history of thoracic aortic aneurysm in mother and intracranial aneurysm in father, I recommend genetic testing of genes known to cause aortopathy.  I reviewed that this would not test for all forms of EDS since her symptoms fit well with the clinical diagnosis of hypermobility spectrum disorder. However, in the presence of family history of thoracic aortic aneurysm, other aortopathy syndromes such as Marfan, Loeys-Vania, and vascular EDS cannot be excluded.     She has a confirmed diagnosis of nephrotic syndrome with pathology showing minimal change disease. I recommend genetic testing to rule out monogenic causes of this. The diagnostic yield of genetic testing has been highest when used in specifically curated pediatric populations such as nephrotic syndrome (PMID 89768504).     Benefits of having genetic test include 1) to establish a molecular diagnosis; 2) to provide further medical recommendations specific to each diagnosis; 3) for familial cascade testing, recurrence risk estimation, and family planning; and 4) to allow for participation in support group organizations and enrolment in clinical trials, if any. Pretest genetic counseling was provided, including the nature of the test; three types of test result (positive, negative, and uncertain); the fact that a negative result does not exclude a possibility of genetic disorders; retention of de-identified genetic data at the testing company. The patient provided a verbal informed consent.     Not discussed today: Her father who has metastatic prostate cancer does not wish to pursue testing. She is the next candidate for testing of cancer genes.     Plan:  -Two genetic tests ordered today:   1) [facility] TAAD panel to rule out aortopathy syndromes. Buccal swab to be sent to her address. Insurance billing. TAT 1 mo.   2) [facility] Renas[facility] panel to rule out monogenic causes of nephroatic syndrome. Buccal swab to be sent to her address. Insurance billing. TAT 1 mo.   -Mother to see Genetics given history of thoracic aortic aneurysm. I instructed her to keep me informed re: her mother's genetic test results.   -I  requested that she fax the report of recent eye exam to me ([facility] in [place]).     RTC W 6/4 at 1pm, virtually      Thank you for allowing me to participate in the care of this patient. Please do not hesitate to contact me if you have any questions.     Roscoe Pino MD    Medical Geneticist  [facility]  Phone: [phone]  Fax: [phone]   Address: [address] [place] [address]  Time spent (this information is required by insurance): face-to-face 33min; preparation 5min; documentation 20min; placing order(s) for genetic testing 10min; total time spent 68min

## 2025-04-10 ENCOUNTER — DOCUMENTATION (OUTPATIENT)
Dept: OTHER | Facility: CLINIC | Age: 40
End: 2025-04-10
Payer: COMMERCIAL

## 2025-04-10 DIAGNOSIS — R41.89 COGNITIVE DEFICITS: Primary | ICD-10-CM

## 2025-04-11 ENCOUNTER — APPOINTMENT (OUTPATIENT)
Dept: PHYSICAL THERAPY | Facility: CLINIC | Age: 40
End: 2025-04-11
Payer: COMMERCIAL

## 2025-04-16 ENCOUNTER — APPOINTMENT (OUTPATIENT)
Dept: PHYSICAL THERAPY | Facility: CLINIC | Age: 40
End: 2025-04-16
Payer: COMMERCIAL

## 2025-04-21 ENCOUNTER — OFFICE VISIT (OUTPATIENT)
Dept: GASTROENTEROLOGY | Facility: CLINIC | Age: 40
End: 2025-04-21
Payer: COMMERCIAL

## 2025-04-21 DIAGNOSIS — R19.7 DIARRHEA, UNSPECIFIED TYPE: Primary | ICD-10-CM

## 2025-04-21 PROCEDURE — 99213 OFFICE O/P EST LOW 20 MIN: CPT | Mod: 95 | Performed by: NURSE PRACTITIONER

## 2025-04-21 PROCEDURE — 99213 OFFICE O/P EST LOW 20 MIN: CPT | Performed by: NURSE PRACTITIONER

## 2025-04-21 PROCEDURE — 1036F TOBACCO NON-USER: CPT | Performed by: NURSE PRACTITIONER

## 2025-04-21 RX ORDER — FAMOTIDINE 20 MG/1
TABLET, FILM COATED ORAL
COMMUNITY

## 2025-04-21 RX ORDER — DULOXETIN HYDROCHLORIDE 30 MG/1
1 CAPSULE, DELAYED RELEASE ORAL
COMMUNITY
Start: 2025-02-13

## 2025-04-21 ASSESSMENT — ENCOUNTER SYMPTOMS
WHEEZING: 0
PSYCHIATRIC NEGATIVE: 1
APNEA: 0
SHORTNESS OF BREATH: 0
CARDIOVASCULAR NEGATIVE: 1
FATIGUE: 0
DIFFICULTY URINATING: 0
CHILLS: 0
FEVER: 0
HEMATOLOGIC/LYMPHATIC NEGATIVE: 1
STRIDOR: 0
EYES NEGATIVE: 1
ROS GI COMMENTS: SEE HPI
COUGH: 0
CHEST TIGHTNESS: 0
RESPIRATORY NEGATIVE: 1
NEUROLOGICAL NEGATIVE: 1
ENDOCRINE NEGATIVE: 1
DIAPHORESIS: 0
MUSCULOSKELETAL NEGATIVE: 1
ALLERGIC/IMMUNOLOGIC NEGATIVE: 1

## 2025-04-21 NOTE — PROGRESS NOTES
"Subjective   Patient ID: Rajani Morton \"Rachana\" is a 39 y.o. female who presents for Follow-up. PMH: HTN, hypothyroidism, POTs  Last seen in GI on 12/2024 for C diff    Underwent a colonoscopy and EGD  Colonoscopy biopsies were negative and no signs of colitis  EGD showed GERD    CBC (3/2025) WNL  C diff in December   CTE not complete    She is working on COVID disability, she was recommended to follow-up  She still has stomach issues, thought to be 2/2 MAST cell facilitation syndrome-she see's the immunologist in May    She is still having diarrhea, sometimes. She also has constipation. This occurs weekly. She reports stools are not like C diff. Still having intermittent diarrhea, gas and bloating. She is no longer having rectal bleeding, she has intermittent constipation.   Has diarrhea for a few days, then she has constipation and then will have rock hard stools.       CT found    IMPRESSION:     MODERATE NONSPECIFIC COLITIS, WORSE IN THE LEFT UPPER QUADRANT, NEW SINCE   10/21/2023. SUGGEST CLINICAL CORRELATION AND GI SURGICAL FOLLOW-UP.     Family Hx: \"a lot of diverticulitis\"  Unsure of IBD, she denies a family hx of CRC, GI cancers     Review of Systems   Constitutional:  Negative for chills, diaphoresis, fatigue and fever.   HENT: Negative.     Eyes: Negative.    Respiratory: Negative.  Negative for apnea, cough, chest tightness, shortness of breath, wheezing and stridor.    Cardiovascular: Negative.    Gastrointestinal:         See HPI    Endocrine: Negative.    Genitourinary: Negative.  Negative for difficulty urinating.   Musculoskeletal: Negative.    Skin: Negative.    Allergic/Immunologic: Negative.    Neurological: Negative.    Hematological: Negative.    Psychiatric/Behavioral: Negative.         Objective   Physical Exam  Neurological:      Mental Status: She is alert.         Assessment/Plan   Diagnoses and all orders for this visit:  Diarrhea, unspecified type  Constipation      39 year old female " with a PMH of HTN, hypothyroidism, POTs who requests a telephone follow-up. She continues to have intermittent episodes of diarrhea and constipation, with a prior h/o elevated fecal calprotectin and C diff, and a normal colonoscopy. Will recheck a fecal calprotectin and if remains elevated will proceed with CTE. She does reports she is being worked up for [facility]AS. If fecal calprotectin normal, proceed with breath test to r/o SIBO.    Prior EGD suspicious for Rosenthal's however biopsies showed GERD and mild chronic gastritis, no intestinal metaplasia, continue Omeprazole.          Stephanie Dyson, CRISTIN-CNP 04/21/25 9:37 AM

## 2025-05-01 LAB — CALPROTECTIN STL-MCNT: 120 MCG/G

## 2025-05-21 ENCOUNTER — APPOINTMENT (OUTPATIENT)
Dept: ALLERGY | Facility: CLINIC | Age: 40
End: 2025-05-21
Payer: COMMERCIAL

## 2025-05-27 NOTE — PROGRESS NOTES
{JTVisit:40348}    Subjective   COVID-19 Infection Date:  12/14/2020 confirmed via rapid home antigen test (sx: Fever, Fatigue, Loss or disturbed smell, Headache, Sore throat, Cough, Pain on breathing, Loss or disturbed taste, Muscle pain, Sinus pressure, Shortness of breath, Chest pain, Runny nose, Abdominal pain  - no hospitalization, treated with Augmentin and prednisone)  December 2021 confimed via rapid home antigen test (sx: Fever, Cough, Runny nose, Sore throat, Sinus Pressure - no hospitalization, treated with Augmentin and Prednisone for sinusitis)  Spring 2022 confirmed via rapid home antigen test (sx: Fever, Cough, Runny nose, Sore throat, Sinus Pressure  - no hospitalization, treated with Augmentin and prednisone for sinusitis)  9/20/2023 confirmed via rapid home antigen test (sx: Fever, Cough, Shortness of breath, Fatigue, Pain on breathing, Chest pain, Loss or disturbed smell, Loss or disturbed taste, Runny nose, Headache, Muscle pain, Abdominal pain, Diarrhea, Brain fog, Sore throat, Sinus pressure  - no hospitalization, treated with Prednisone for sinusitis)    COVID-19 vaccine status: Pfizer-BionTWoowUp 11/20/21, 12/11/21, 5/17/22     Occupation: full-time  for [employer] working remotely    Current Providers: PCP Faustina Soria, Cardiology Dr. Aleman, GI CNP Karis, ENT MICHAEL Salgado and Dr. Richard, Genetics Dr. Pino, OT Rachana Dennis    Survey Scores: 01/2024 -> 07/2024 -> 03/2025  PHQ-9: 16 -> 15 -> 19   CHAITANYA-7: 6  -> 6 -> 9  Sleep Wellness: 9  -> 9 -> 8  FSS average: 6.56  -> 6.89 -> 7  Modified Ecog average: 2.17  -> 2.83 -> 3.42  MOCA: 21/22 (02/2024) -> 19/22 (03/2025)  Overall Health: 50 -> 45 -> 50 -> 20    39 y.o. female with a h/o COVID-19 in December 2020, December 2021, Spring 2022, September 2023, hypothyroidism, endometriosis, fibromyalgia, POTS, HTN, migraines, obesity, IBS, presents for follow up in the [facility] c/o GI complaints,  dizziness, palpitations and HR irregularities, pain, anxiety and depression, fatigue, brain fog, tinnitus, smell and taste changes, shortness of breath, chest pain, rashes/skin flushing, numbness and tingling, muscle cramps, tremors, muscle weakness    Feels that GI problems have been due to IBS/IBD and MCAS, has been keeping track of what she is eating and effects on GI system  Hoping that Cdiff is gone, has not noticed specific odor  Has not talked to GI since she has had an antibiotic, waiting to see what happens at immunology appointment  Still episodes of abdominal pain and diarrhea, usually in the middle of eating something, inconsistent when it comes to triggering food, diarrhea comes in waves, will go from watery stool to rock-hard constipation a few days later  CTE ordered by GI was scheduled in January, facility rescheduled, then she had to reschedule due to severe diarrhea  Insurance coverage problem for CTE, Gi noted that she may not need it  Has lost a total of 53lbs since November, hair is falling out  Muscle tone is down because she is inactive, not tolerating activity well  Seeing Dr. Braxton in May for MCAS concern  Brain MRI scheduled next week  New dizziness is very different, was severe 6 weeks ago, often triggered by overexertion  Bright lights and dizziness causes blurry vision  Was diagnosed with migraines as a teenager, those have lessened, has not had significant headaches  Intermittent dizziness, last week had two days where dizziness was consistent for 2 days  If moving eyes or head from side to side it makes her dizzy, when moving eyes also will have louder ringing in her ears  BP is really high sometimes when dizzy, sometimes low  Was not taking middle of the day propranolol for POTS for a few weeks because HR dropped into 30s  Last few days HR will go up to 170 when standing up, then drops again  Feeling better this week compared to last week  Chronic pain is ongoing, everything  hurts non-stop, way more random shots of pinprick pain, 2-3 times per day  Often that pain comes with muscle spasms, foot will reflex for example  Also having muscle cramps  Brain fog has been more prevalent, forgetting words and what she is doing often  Fatigue has been worse, sleeping until 10/11/12 often which is not normal for her, still tired  Very best friend was admitted to psychiatric [facility] this weekend for suspected SI  Relationship with her mom has been good but through that is learning bad things about her father  Barely can take care of herself, has lost her ability to work and cannot do anything enjoyable  Seeing therapist again next week, didn't have them for a while due to financial constraints  Was not having much problems with SOB unless exerting herself, but then the past few weeks her night-time oxymetry monitor showed drops below 90%  Taste has been very strange, tested herself for COVID twice just because of that and it was negative  Out of nowhere things she likes take very bad or really off  Does not feel as though her smell has changed, any issues with scent are more MCAS related, has always be sensitive to over powerful smells, getting tingling sensation and coughing and SOB when around a smell  Having many skin reactions, red all over, often when she eats, also tingling in her lips and in her mouth when she is eating  Ongoing hives, has had those her whole life  Had chest pain terribly last night, feels that stress is contributing, financial stressors are contributing  Tremors have been worse, relates this to increased stressors as well  Feels better when she is primarily resting, more symptomatic when she is does something around the house  Has not yet explored low histamine diet, waiting to discuss with allergy/immunology  Never saw the PT provider for TMJ due to illness, was referred to aquatherapy but then was diagnosed with Cdiff  Providers are too far away, unable to see them in person  due to severity of symptoms  Cannot make a follow up visit with Dr. Pino, central scheduling makes her schedule a new patient visit and those are scheduled out very far  Has only been able to leave the house three times since last visit due to symptoms    Canceled Allergy/Immunology 5/21  No Show Neuropsychology 5/8  Canceled PT x2 in April    Relevant prior healthcare visits:  -04/2025 GI repeating fecal calcprotectin with plan for CTE if remains elevated, if normal plan for breath test to rule out SIBO, continue omeprazole  -04/2025 Genetics recommends genetic testing of genes known to cause aortopathy given family hx, also recommends genetic testing in light of nephrotic syndrome history, notes that her symptoms fit well with the clinical diagnosis of hypermobility spectrum disorder  -12/2024 OT notes improvement in use of energy conservation techniques, would benefit from further instruction to maximize fatigue management, receptive to instruction on use of internal memory strategies, performed better with linking and chunking than with visualization, would benefit from further instruction on memory strategies  -10/2024 PT eval for jaw and neck pain  -09/2024 ENT for minor salivary gland biopsy for suspected Sjögren's   -08/2024 ENT for tinnitus and TMJ dysfunction, recommend PT and lifestyle strategies  -05/2024 ENT stopped omeprazole for LPR, recommends follow up with Dr. Richard for chronic sinusitis/odontogenic sinusitis, audiogram ordered due to tinnitus  -05/2024 ENT Dr. Richard for left chronic sinusitis and odontogenic sinusitis, recommends dental evaluation  -03/2024 Cardiology Dr. Kam ordered CPET for PASC-related exercise intolerance, chest pain, tachycardia, DELGADO  -02/2024 PCP prescribed Flonase and medrol dose pack for sinus complaint  -12/2023 ED for syncope in shower and foot injury  -10/2023 GI ordered fibroscan for fatty liver, fiber supplement and miralax for IBS-C, omeprazole and  gallbladder US for RUQ abdominal pain, referred to cardiology for HTN and tachycardia  -11/2022 Sleep Medicine [facility] EDS, recommends follow-up with rheumatology as scheduled, PSG and MSLT    Relevant prior diagnostic studies:  -03/2025 MRI brain unremarkable, mild-to-moderate degenerative changes in visualized c-spine  -11/2024 EGD/colonoscopy shows GERD, mild chronic nonspecific gastritis, neg H. Pylori  -09/2024 EMG unremarkable except for presence of mild chronic neurogenic changes in 2 muscles innervated by the C8 segment/root, not sufficient for a definite diagnosis of a cervical radiculopathy at this level  -09/2024 QSART not consistent with a definite postganglionic sympathetic sudomotor abnormality like that seen in autonomic/small fiber neuropathy, isolated decreased sweat output at the distal leg is of unclear significance  -09/2024 CT a/p shows moderate nonspecific colitis  -05/2024 CT sinus shows complete opacification of the left ostiomeatal unit and resultant severe mucoasal disease of the left maxillary sinus and left anterior ethmoid air cells  -04/2024 HSAT shows very mild ED with O2 garret 83.1  -02/2024 autonomic testing consistent with POTS  -02/2024 CTA chest shows no PE and clear lungs  -12/2023 CXR with clear lungs  -11/2023 PFTs normal  -11/2023 FibroSCAN shows stage 0-1 disease and severe steatosis  -11/2023 echocardiogram with LVEF 50%, borderline pulmonary HTN with RVSP 33-35 mmHg, endocardial walls not well-visualized leading to inability to accurately calculation of EF with possible mild hypokinesis in the anterior lateral wall but this is very poorly visualized  -10/2023 US gallbladder unremarkable, prominent liver  -10/2023 CT abdomen and pelvis unremarkable   -03/2023 colonoscopy normal    Relevant prior laboratory values, unremarkable unless noted:  -04/2025 tryptase, plasma histamine 3.6, fecal calprotectin  -03/2025 TSH, T3, T4, MMA, Vitamin D 16, Vitamin B12, CBC, BMP, Mag  -10/2024  stool cultures, calprotectin elevated, TTG, CRP 1.23  -09/2024 CMP, CBC/D with WBC 16, Mag, Lipase, Vitamin B12 350, Early Sjögren's Ab, [facility] Ab panel  -05/2024 Vitamin B12 250, Vitamin D 38  -02/2024 GGT, cryoglobulin, urine porphobilinogen, urine metanephrines, urine organic acids with elevation in lactic acid, urine copper, UPEP, aldolase, catecholamines, carnitine, TPO ab, Dys-2, Vitamin E, Anti-thyroglobulin ab, T3, T4, anti-parietal cell ab, paraneoplastic ab, mycoplasma pn IgM negative and IgG positive, MMA, homocysteine, serum copper, CoQ10, Mag, Vitamin D 13, Vitamin B12 302, CMP, CK, BNP, CCP, iron studies, TSH, ESR 34, RF, D-Dimer 840, CBC/D, HbA1c, Folate, Ferritin, CRP 1.28, JEET, am cortisol, Vitamin B6, Vitamin B1, Troponin, SPEP, Tryptase  -12/2023 Troponin, CMP, CBC/D  -10/2023 iron studies, Hepatitis panel, Anti-smooth muscle ab, anti-mitochondrial ab, [id number], Mag, Lipase, D-Dimer  705, Vitamin D, TSH  -10/2022 ferritin, T4, vitamin B12 377, JEET, CCP ab, sjogren ab, TPO ab, thyroglubin ab  -05/2022 HbA1c 4.8%, RF, CRP 1.3, ESR, uric acid    Exercise routine: none  Diet:  Weight hx: pre-COVID-19 230  lbs -> post-COVID-19 255  lbs -> 250 lbs -> 245 lbs -> (Patient-Rptd) 209   Substance use: former tobacco use, 1-2 servings ETOH monthly or less   Social:     Current Medications[1]    Medical History[2]    Surgical History[3]    Family History[4]    Objective   LMP 11/01/2019     Physical Exam    Assessment/Plan   {Assess/PlanSmartLinks:75521}       [1]   Current Outpatient Medications:     azelastine (Astelin) 137 mcg (0.1 %) nasal spray, Administer 1 spray into each nostril 2 times a day. Use in each nostril as directed, Disp: , Rfl:     cetirizine (ZyrTEC) 10 mg chewable tablet, Chew once daily., Disp: , Rfl:     cholecalciferol (Vitamin D3) 1,250 mcg (50,000 unit) tablet, Take 1 tablet (50,000 Units) by mouth every 7 days., Disp: 12 tablet, Rfl: 0    cyclobenzaprine (Flexeril)  10 mg tablet, Take 1 tablet (10 mg) by mouth 3 times a day as needed for muscle spasms., Disp: , Rfl:     dexAMETHasone (Decadron) 4 mg tablet, Take 1 tablet (4 mg) by mouth 2 times daily (morning and late afternoon) for 4 days., Disp: 8 tablet, Rfl: 0    DULoxetine (Cymbalta) 30 mg DR capsule, Take 1 capsule (30 mg) by mouth every 12 hours., Disp: , Rfl:     famotidine (Pepcid) 20 mg tablet, Take by mouth., Disp: , Rfl:     fluticasone (Flonase) 50 mcg/actuation nasal spray, Administer 1 spray into each nostril once daily. Shake gently. Before first use, prime pump. After use, clean tip and replace cap., Disp: 16 g, Rfl: 5    ibuprofen (IBU) 800 mg tablet, Take 1 tablet (800 mg) by mouth every 6 hours if needed for mild pain (1 - 3) or moderate pain (4 - 6)., Disp: 30 tablet, Rfl: 0    ibuprofen 800 mg tablet, Take 1 tablet (800 mg) by mouth every 6 hours if needed for headaches, mild pain (1 - 3) or moderate pain (4 - 6). (Patient not taking: Reported on 4/21/2025), Disp: , Rfl:     levothyroxine (Synthroid, Levoxyl) 50 mcg tablet, Take 1 tablet (50 mcg) by mouth once daily., Disp: 30 tablet, Rfl: 1    lisinopril 20 mg tablet, Take 1 tablet (20 mg) by mouth once daily., Disp: 90 tablet, Rfl: 0    methocarbamol (Robaxin) 750 mg tablet, Take 1 tablet (750 mg) by mouth 3 times a day as needed for muscle spasms., Disp: 30 tablet, Rfl: 0    methylphenidate ER (CONCERTA) 27 mg oral extended release tablet, Take 1 tablet (27 mg) by mouth once daily in the morning., Disp: , Rfl:     NON FORMULARY, Place 1 each under the tongue once daily. B12 5000 MCG, Disp: , Rfl:     omeprazole (PriLOSEC) 20 mg DR capsule, TAKE 1 CAPSULE (20 MG) BY MOUTH ONCE DAILY IN THE MORNING. TAKE BEFORE MEALS. DO NOT CRUSH OR CHEW., Disp: 90 capsule, Rfl: 0    propranolol (Inderal) 10 mg tablet, TAKE 1 TABLET BY MOUTH TWICE A DAY, Disp: 180 tablet, Rfl: 0    wheat dextrin (Benefiber Healthy Shape) 5 gram/7.4 gram powder, Take 1 teaspoon daily,  Disp: 248 g, Rfl: 1  [2]   Past Medical History:  Diagnosis Date    ADHD     Allergic     Avascular necrosis of bone (Multi) 2019    both hips, from steroids    Disease of thyroid gland     Fibromyalgia     Hypertension     POTS (postural orthostatic tachycardia syndrome)    [3]   Past Surgical History:  Procedure Laterality Date    HYSTERECTOMY      1 ovary left    LABYRINTHECTOMY      OTHER SURGICAL HISTORY  01/07/2020    Laparoscopic hysterectomy    RENAL BIOPSY     [4]   Family History  Problem Relation Name Age of Onset    No Known Problems Mother

## 2025-05-30 ENCOUNTER — APPOINTMENT (OUTPATIENT)
Dept: OTHER | Facility: CLINIC | Age: 40
End: 2025-05-30

## 2025-06-04 ENCOUNTER — APPOINTMENT (OUTPATIENT)
Dept: GENETICS | Facility: CLINIC | Age: 40
End: 2025-06-04
Payer: COMMERCIAL

## 2025-06-17 ENCOUNTER — DOCUMENTATION (OUTPATIENT)
Dept: OCCUPATIONAL THERAPY | Facility: HOSPITAL | Age: 40
End: 2025-06-17
Payer: COMMERCIAL

## 2025-06-17 NOTE — PROGRESS NOTES
"Occupational Therapy    Discharge Summary    Name: Rajani Morton \"Rachana\"  MRN: [id number]  : 1985  Date: 25    Discharge Summary: OT    Discharge Information: Date of discharge 25, Date of last visit 24, Date of evaluation 24, Number of attended visits 6, Referred by Roxanne Hummel, and Referred for cognitive deficits and fatigue related to PASC    Therapy Summary: patient participated in energy conservation instruction, cognitive skills retraining.     Discharge Status: goals partially met.     Rehab Discharge Reason: Failed to schedule and/or keep follow-up appointment(s)  "

## 2025-07-10 DIAGNOSIS — G90.A POTS (POSTURAL ORTHOSTATIC TACHYCARDIA SYNDROME): ICD-10-CM

## 2025-07-10 DIAGNOSIS — R00.2 PALPITATIONS: ICD-10-CM

## 2025-07-10 DIAGNOSIS — U09.9 POST-ACUTE SEQUELAE OF COVID-19 (PASC): ICD-10-CM

## 2025-07-10 RX ORDER — PROPRANOLOL HYDROCHLORIDE 10 MG/1
10 TABLET ORAL 2 TIMES DAILY
Qty: 180 TABLET | Refills: 0 | Status: SHIPPED | OUTPATIENT
Start: 2025-07-10

## 2025-07-10 RX ORDER — LISINOPRIL 20 MG/1
20 TABLET ORAL DAILY
Qty: 90 TABLET | Refills: 0 | Status: SHIPPED | OUTPATIENT
Start: 2025-07-10

## 2025-09-03 ENCOUNTER — APPOINTMENT (OUTPATIENT)
Dept: ALLERGY | Facility: CLINIC | Age: 40
End: 2025-09-03
Payer: COMMERCIAL